# Patient Record
Sex: FEMALE | Race: WHITE | Employment: UNEMPLOYED | URBAN - METROPOLITAN AREA
[De-identification: names, ages, dates, MRNs, and addresses within clinical notes are randomized per-mention and may not be internally consistent; named-entity substitution may affect disease eponyms.]

---

## 2017-01-04 RX ORDER — FUROSEMIDE 40 MG/1
40 TABLET ORAL DAILY
Qty: 60 TABLET | OUTPATIENT
Start: 2017-01-04

## 2017-01-04 RX ORDER — ISOSORBIDE MONONITRATE 30 MG/1
30 TABLET, EXTENDED RELEASE ORAL DAILY
Qty: 90 TABLET | Refills: 3 | OUTPATIENT
Start: 2017-01-04

## 2017-01-04 RX ORDER — CARVEDILOL 12.5 MG/1
12.5 TABLET ORAL 2 TIMES DAILY
Qty: 180 TABLET | Refills: 3 | OUTPATIENT
Start: 2017-01-04

## 2017-03-24 ENCOUNTER — HOSPITAL ENCOUNTER (INPATIENT)
Age: 82
LOS: 1 days | Discharge: HOME | DRG: 683 | End: 2017-03-26
Attending: INTERNAL MEDICINE | Admitting: INTERNAL MEDICINE
Payer: MEDICARE

## 2017-03-24 ENCOUNTER — APPOINTMENT (OUTPATIENT)
Dept: CT IMAGING | Age: 82
DRG: 683 | End: 2017-03-24
Payer: MEDICARE

## 2017-03-24 DIAGNOSIS — G45.9 TRANSIENT CEREBRAL ISCHEMIA, UNSPECIFIED TYPE: Primary | ICD-10-CM

## 2017-03-24 DIAGNOSIS — R20.2 PARESTHESIA OF LEFT LEG: ICD-10-CM

## 2017-03-24 PROBLEM — F79 MENTAL RETARDATION: Chronic | Status: ACTIVE | Noted: 2017-03-24

## 2017-03-24 PROBLEM — G40.909 SEIZURE DISORDER (HCC): Chronic | Status: ACTIVE | Noted: 2017-03-24

## 2017-03-24 PROBLEM — R20.0 NUMBNESS AND TINGLING OF LEFT LEG: Status: ACTIVE | Noted: 2017-03-24

## 2017-03-24 PROBLEM — I10 ESSENTIAL HYPERTENSION: Chronic | Status: ACTIVE | Noted: 2017-03-24

## 2017-03-24 LAB
ALBUMIN SERPL-MCNC: 3.6 G/DL (ref 3.9–4.9)
ALP BLD-CCNC: 136 U/L (ref 40–130)
ALT SERPL-CCNC: 12 U/L (ref 0–33)
ANION GAP SERPL CALCULATED.3IONS-SCNC: 13 MEQ/L (ref 7–13)
AST SERPL-CCNC: 11 U/L (ref 0–35)
BASOPHILS ABSOLUTE: 0 K/UL (ref 0–0.2)
BASOPHILS RELATIVE PERCENT: 0.6 %
BILIRUB SERPL-MCNC: 0.3 MG/DL (ref 0–1.2)
BUN BLDV-MCNC: 34 MG/DL (ref 8–23)
CALCIUM SERPL-MCNC: 8.5 MG/DL (ref 8.6–10.2)
CHLORIDE BLD-SCNC: 107 MEQ/L (ref 98–107)
CO2: 19 MEQ/L (ref 22–29)
CREAT SERPL-MCNC: 1.68 MG/DL (ref 0.5–0.9)
EOSINOPHILS ABSOLUTE: 0.1 K/UL (ref 0–0.7)
EOSINOPHILS RELATIVE PERCENT: 1.2 %
GFR AFRICAN AMERICAN: 35
GFR NON-AFRICAN AMERICAN: 28.9
GLOBULIN: 2.7 G/DL (ref 2.3–3.5)
GLUCOSE BLD-MCNC: 121 MG/DL (ref 74–109)
HCT VFR BLD CALC: 35.7 % (ref 37–47)
HEMOGLOBIN: 12 G/DL (ref 12–16)
LYMPHOCYTES ABSOLUTE: 0.8 K/UL (ref 1–4.8)
LYMPHOCYTES RELATIVE PERCENT: 16.5 %
MCH RBC QN AUTO: 30.9 PG (ref 27–31.3)
MCHC RBC AUTO-ENTMCNC: 33.5 % (ref 33–37)
MCV RBC AUTO: 92.1 FL (ref 82–100)
MONOCYTES ABSOLUTE: 0.8 K/UL (ref 0.2–0.8)
MONOCYTES RELATIVE PERCENT: 16.5 %
NEUTROPHILS ABSOLUTE: 3.2 K/UL (ref 1.4–6.5)
NEUTROPHILS RELATIVE PERCENT: 65.2 %
PDW BLD-RTO: 14.5 % (ref 11.5–14.5)
PLATELET # BLD: 123 K/UL (ref 130–400)
POTASSIUM SERPL-SCNC: 4.6 MEQ/L (ref 3.5–5.1)
RBC # BLD: 3.87 M/UL (ref 4.2–5.4)
SODIUM BLD-SCNC: 139 MEQ/L (ref 132–144)
T4 FREE: 0.83 NG/DL (ref 0.93–1.7)
TOTAL CK: 35 U/L (ref 0–170)
TOTAL PROTEIN: 6.3 G/DL (ref 6.4–8.1)
TROPONIN: <0.01 NG/ML (ref 0–0.01)
TSH SERPL DL<=0.05 MIU/L-ACNC: 3.5 UIU/ML (ref 0.27–4.2)
WBC # BLD: 4.9 K/UL (ref 4.8–10.8)

## 2017-03-24 PROCEDURE — 70450 CT HEAD/BRAIN W/O DYE: CPT

## 2017-03-24 PROCEDURE — 6370000000 HC RX 637 (ALT 250 FOR IP): Performed by: INTERNAL MEDICINE

## 2017-03-24 PROCEDURE — G0378 HOSPITAL OBSERVATION PER HR: HCPCS

## 2017-03-24 PROCEDURE — 80053 COMPREHEN METABOLIC PANEL: CPT

## 2017-03-24 PROCEDURE — 36415 COLL VENOUS BLD VENIPUNCTURE: CPT

## 2017-03-24 PROCEDURE — 99285 EMERGENCY DEPT VISIT HI MDM: CPT

## 2017-03-24 PROCEDURE — 6370000000 HC RX 637 (ALT 250 FOR IP): Performed by: PHYSICIAN ASSISTANT

## 2017-03-24 PROCEDURE — 84484 ASSAY OF TROPONIN QUANT: CPT

## 2017-03-24 PROCEDURE — 6370000000 HC RX 637 (ALT 250 FOR IP): Performed by: PSYCHIATRY & NEUROLOGY

## 2017-03-24 PROCEDURE — 82550 ASSAY OF CK (CPK): CPT

## 2017-03-24 PROCEDURE — 84443 ASSAY THYROID STIM HORMONE: CPT

## 2017-03-24 PROCEDURE — 6360000002 HC RX W HCPCS: Performed by: INTERNAL MEDICINE

## 2017-03-24 PROCEDURE — 84439 ASSAY OF FREE THYROXINE: CPT

## 2017-03-24 PROCEDURE — 85025 COMPLETE CBC W/AUTO DIFF WBC: CPT

## 2017-03-24 PROCEDURE — 96372 THER/PROPH/DIAG INJ SC/IM: CPT

## 2017-03-24 PROCEDURE — 93005 ELECTROCARDIOGRAM TRACING: CPT

## 2017-03-24 RX ORDER — LEVETIRACETAM 500 MG/1
500 TABLET ORAL DAILY
Status: DISCONTINUED | OUTPATIENT
Start: 2017-03-24 | End: 2017-03-24

## 2017-03-24 RX ORDER — ISOSORBIDE MONONITRATE 30 MG/1
30 TABLET, EXTENDED RELEASE ORAL DAILY
Status: DISCONTINUED | OUTPATIENT
Start: 2017-03-24 | End: 2017-03-26 | Stop reason: HOSPADM

## 2017-03-24 RX ORDER — ACETAMINOPHEN 325 MG/1
650 TABLET ORAL EVERY 4 HOURS PRN
Status: DISCONTINUED | OUTPATIENT
Start: 2017-03-24 | End: 2017-03-26 | Stop reason: HOSPADM

## 2017-03-24 RX ORDER — ASPIRIN 81 MG/1
81 TABLET, CHEWABLE ORAL DAILY
Status: DISCONTINUED | OUTPATIENT
Start: 2017-03-24 | End: 2017-03-26 | Stop reason: HOSPADM

## 2017-03-24 RX ORDER — ASPIRIN 81 MG/1
324 TABLET, CHEWABLE ORAL ONCE
Status: COMPLETED | OUTPATIENT
Start: 2017-03-24 | End: 2017-03-24

## 2017-03-24 RX ORDER — ATENOLOL 100 MG/1
100 TABLET ORAL DAILY
Status: ON HOLD | COMMUNITY
End: 2017-03-26 | Stop reason: HOSPADM

## 2017-03-24 RX ORDER — SODIUM CHLORIDE 0.9 % (FLUSH) 0.9 %
10 SYRINGE (ML) INJECTION PRN
Status: DISCONTINUED | OUTPATIENT
Start: 2017-03-24 | End: 2017-03-26 | Stop reason: HOSPADM

## 2017-03-24 RX ORDER — SPIRONOLACTONE 25 MG/1
25 TABLET ORAL DAILY
Status: DISCONTINUED | OUTPATIENT
Start: 2017-03-24 | End: 2017-03-26 | Stop reason: HOSPADM

## 2017-03-24 RX ORDER — SIMVASTATIN 20 MG
20 TABLET ORAL NIGHTLY
Status: DISCONTINUED | OUTPATIENT
Start: 2017-03-24 | End: 2017-03-26 | Stop reason: HOSPADM

## 2017-03-24 RX ORDER — POTASSIUM CHLORIDE 20 MEQ/1
10 TABLET, EXTENDED RELEASE ORAL DAILY
Status: DISCONTINUED | OUTPATIENT
Start: 2017-03-24 | End: 2017-03-26 | Stop reason: HOSPADM

## 2017-03-24 RX ORDER — PHENYTOIN SODIUM 100 MG/1
100 CAPSULE, EXTENDED RELEASE ORAL 3 TIMES DAILY
Status: DISCONTINUED | OUTPATIENT
Start: 2017-03-24 | End: 2017-03-26 | Stop reason: HOSPADM

## 2017-03-24 RX ORDER — LEVETIRACETAM 500 MG/1
500 TABLET ORAL 2 TIMES DAILY
Status: DISCONTINUED | OUTPATIENT
Start: 2017-03-24 | End: 2017-03-26 | Stop reason: HOSPADM

## 2017-03-24 RX ORDER — SODIUM CHLORIDE 0.9 % (FLUSH) 0.9 %
10 SYRINGE (ML) INJECTION EVERY 12 HOURS SCHEDULED
Status: DISCONTINUED | OUTPATIENT
Start: 2017-03-24 | End: 2017-03-26 | Stop reason: HOSPADM

## 2017-03-24 RX ORDER — AMLODIPINE BESYLATE 5 MG/1
5 TABLET ORAL DAILY
Status: DISCONTINUED | OUTPATIENT
Start: 2017-03-24 | End: 2017-03-26 | Stop reason: HOSPADM

## 2017-03-24 RX ORDER — ATENOLOL 50 MG/1
100 TABLET ORAL DAILY
Status: DISCONTINUED | OUTPATIENT
Start: 2017-03-24 | End: 2017-03-26 | Stop reason: HOSPADM

## 2017-03-24 RX ORDER — FUROSEMIDE 40 MG/1
40 TABLET ORAL DAILY
Status: DISCONTINUED | OUTPATIENT
Start: 2017-03-24 | End: 2017-03-25

## 2017-03-24 RX ADMIN — ENOXAPARIN SODIUM 30 MG: 30 INJECTION SUBCUTANEOUS at 17:40

## 2017-03-24 RX ADMIN — LEVETIRACETAM 500 MG: 500 TABLET, FILM COATED ORAL at 21:20

## 2017-03-24 RX ADMIN — PHENYTOIN SODIUM 100 MG: 100 CAPSULE, EXTENDED RELEASE ORAL at 21:20

## 2017-03-24 RX ADMIN — ASPIRIN 324 MG: 81 TABLET, CHEWABLE ORAL at 13:50

## 2017-03-24 RX ADMIN — PHENYTOIN SODIUM 100 MG: 100 CAPSULE, EXTENDED RELEASE ORAL at 17:40

## 2017-03-24 RX ADMIN — SIMVASTATIN 20 MG: 20 TABLET, FILM COATED ORAL at 21:20

## 2017-03-24 ASSESSMENT — ENCOUNTER SYMPTOMS
COLOR CHANGE: 0
RHINORRHEA: 0
SHORTNESS OF BREATH: 0
ABDOMINAL DISTENTION: 0
SORE THROAT: 0
CONSTIPATION: 0
EYE DISCHARGE: 0
ABDOMINAL PAIN: 0

## 2017-03-25 ENCOUNTER — APPOINTMENT (OUTPATIENT)
Dept: ULTRASOUND IMAGING | Age: 82
DRG: 683 | End: 2017-03-25
Payer: MEDICARE

## 2017-03-25 LAB
ANION GAP SERPL CALCULATED.3IONS-SCNC: 12 MEQ/L (ref 7–13)
BUN BLDV-MCNC: 27 MG/DL (ref 8–23)
CALCIUM SERPL-MCNC: 8.6 MG/DL (ref 8.6–10.2)
CHLORIDE BLD-SCNC: 108 MEQ/L (ref 98–107)
CO2: 21 MEQ/L (ref 22–29)
CREAT SERPL-MCNC: 1.22 MG/DL (ref 0.5–0.9)
GFR AFRICAN AMERICAN: 50.6
GFR NON-AFRICAN AMERICAN: 41.8
GLUCOSE BLD-MCNC: 153 MG/DL (ref 74–109)
POTASSIUM SERPL-SCNC: 4.7 MEQ/L (ref 3.5–5.1)
SODIUM BLD-SCNC: 141 MEQ/L (ref 132–144)

## 2017-03-25 PROCEDURE — 2580000003 HC RX 258: Performed by: INTERNAL MEDICINE

## 2017-03-25 PROCEDURE — 6370000000 HC RX 637 (ALT 250 FOR IP): Performed by: INTERNAL MEDICINE

## 2017-03-25 PROCEDURE — 6360000002 HC RX W HCPCS: Performed by: INTERNAL MEDICINE

## 2017-03-25 PROCEDURE — 95816 EEG AWAKE AND DROWSY: CPT

## 2017-03-25 PROCEDURE — 80048 BASIC METABOLIC PNL TOTAL CA: CPT

## 2017-03-25 PROCEDURE — 93880 EXTRACRANIAL BILAT STUDY: CPT

## 2017-03-25 PROCEDURE — 36415 COLL VENOUS BLD VENIPUNCTURE: CPT

## 2017-03-25 PROCEDURE — 1210000000 HC MED SURG R&B

## 2017-03-25 PROCEDURE — 6370000000 HC RX 637 (ALT 250 FOR IP): Performed by: PSYCHIATRY & NEUROLOGY

## 2017-03-25 RX ORDER — SODIUM CHLORIDE 9 MG/ML
INJECTION, SOLUTION INTRAVENOUS CONTINUOUS
Status: DISCONTINUED | OUTPATIENT
Start: 2017-03-25 | End: 2017-03-26 | Stop reason: HOSPADM

## 2017-03-25 RX ADMIN — PHENYTOIN SODIUM 100 MG: 100 CAPSULE, EXTENDED RELEASE ORAL at 13:53

## 2017-03-25 RX ADMIN — PHENYTOIN SODIUM 100 MG: 100 CAPSULE, EXTENDED RELEASE ORAL at 21:44

## 2017-03-25 RX ADMIN — SODIUM CHLORIDE: 9 INJECTION, SOLUTION INTRAVENOUS at 12:04

## 2017-03-25 RX ADMIN — FUROSEMIDE 40 MG: 40 TABLET ORAL at 09:33

## 2017-03-25 RX ADMIN — SODIUM CHLORIDE: 9 INJECTION, SOLUTION INTRAVENOUS at 22:45

## 2017-03-25 RX ADMIN — PHENYTOIN SODIUM 100 MG: 100 CAPSULE, EXTENDED RELEASE ORAL at 09:33

## 2017-03-25 RX ADMIN — POTASSIUM CHLORIDE 10 MEQ: 20 TABLET, EXTENDED RELEASE ORAL at 09:34

## 2017-03-25 RX ADMIN — SERTRALINE HYDROCHLORIDE 50 MG: 50 TABLET ORAL at 09:33

## 2017-03-25 RX ADMIN — Medication 10 ML: at 09:05

## 2017-03-25 RX ADMIN — SIMVASTATIN 20 MG: 20 TABLET, FILM COATED ORAL at 21:44

## 2017-03-25 RX ADMIN — ISOSORBIDE MONONITRATE 30 MG: 30 TABLET, EXTENDED RELEASE ORAL at 09:34

## 2017-03-25 RX ADMIN — ENOXAPARIN SODIUM 30 MG: 30 INJECTION SUBCUTANEOUS at 17:03

## 2017-03-25 RX ADMIN — LEVETIRACETAM 500 MG: 500 TABLET, FILM COATED ORAL at 09:34

## 2017-03-25 RX ADMIN — ASPIRIN 81 MG: 81 TABLET, CHEWABLE ORAL at 09:32

## 2017-03-25 RX ADMIN — AMLODIPINE BESYLATE 5 MG: 5 TABLET ORAL at 09:34

## 2017-03-25 RX ADMIN — ATENOLOL 100 MG: 50 TABLET ORAL at 09:34

## 2017-03-25 RX ADMIN — SPIRONOLACTONE 25 MG: 25 TABLET, FILM COATED ORAL at 09:34

## 2017-03-25 RX ADMIN — LEVETIRACETAM 500 MG: 500 TABLET, FILM COATED ORAL at 21:44

## 2017-03-26 VITALS
WEIGHT: 135.58 LBS | RESPIRATION RATE: 16 BRPM | BODY MASS INDEX: 24.95 KG/M2 | HEART RATE: 68 BPM | SYSTOLIC BLOOD PRESSURE: 126 MMHG | DIASTOLIC BLOOD PRESSURE: 59 MMHG | TEMPERATURE: 98.4 F | HEIGHT: 62 IN | OXYGEN SATURATION: 96 %

## 2017-03-26 PROCEDURE — G8978 MOBILITY CURRENT STATUS: HCPCS

## 2017-03-26 PROCEDURE — 6370000000 HC RX 637 (ALT 250 FOR IP): Performed by: PSYCHIATRY & NEUROLOGY

## 2017-03-26 PROCEDURE — G8987 SELF CARE CURRENT STATUS: HCPCS

## 2017-03-26 PROCEDURE — 6370000000 HC RX 637 (ALT 250 FOR IP): Performed by: INTERNAL MEDICINE

## 2017-03-26 PROCEDURE — 2580000003 HC RX 258: Performed by: INTERNAL MEDICINE

## 2017-03-26 PROCEDURE — G8988 SELF CARE GOAL STATUS: HCPCS

## 2017-03-26 PROCEDURE — G8979 MOBILITY GOAL STATUS: HCPCS

## 2017-03-26 PROCEDURE — 97162 PT EVAL MOD COMPLEX 30 MIN: CPT

## 2017-03-26 PROCEDURE — G8989 SELF CARE D/C STATUS: HCPCS

## 2017-03-26 PROCEDURE — 97166 OT EVAL MOD COMPLEX 45 MIN: CPT

## 2017-03-26 PROCEDURE — 6360000002 HC RX W HCPCS: Performed by: INTERNAL MEDICINE

## 2017-03-26 RX ORDER — AMLODIPINE BESYLATE 5 MG/1
10 TABLET ORAL DAILY
Qty: 60 TABLET | Refills: 0 | Status: SHIPPED | OUTPATIENT
Start: 2017-03-26 | End: 2019-10-21

## 2017-03-26 RX ORDER — LEVETIRACETAM 500 MG/1
500 TABLET ORAL 2 TIMES DAILY
Qty: 60 TABLET | Refills: 0 | Status: SHIPPED | OUTPATIENT
Start: 2017-03-26

## 2017-03-26 RX ADMIN — ENOXAPARIN SODIUM 30 MG: 30 INJECTION SUBCUTANEOUS at 09:28

## 2017-03-26 RX ADMIN — ASPIRIN 81 MG: 81 TABLET, CHEWABLE ORAL at 09:30

## 2017-03-26 RX ADMIN — PHENYTOIN SODIUM 100 MG: 100 CAPSULE, EXTENDED RELEASE ORAL at 18:47

## 2017-03-26 RX ADMIN — SPIRONOLACTONE 25 MG: 25 TABLET, FILM COATED ORAL at 09:30

## 2017-03-26 RX ADMIN — LEVETIRACETAM 500 MG: 500 TABLET, FILM COATED ORAL at 09:30

## 2017-03-26 RX ADMIN — SERTRALINE HYDROCHLORIDE 50 MG: 50 TABLET ORAL at 09:30

## 2017-03-26 RX ADMIN — PHENYTOIN SODIUM 100 MG: 100 CAPSULE, EXTENDED RELEASE ORAL at 09:30

## 2017-03-26 RX ADMIN — AMLODIPINE BESYLATE 5 MG: 5 TABLET ORAL at 09:30

## 2017-03-26 RX ADMIN — ATENOLOL 100 MG: 50 TABLET ORAL at 09:29

## 2017-03-26 RX ADMIN — SODIUM CHLORIDE: 9 INJECTION, SOLUTION INTRAVENOUS at 09:28

## 2017-03-26 RX ADMIN — POTASSIUM CHLORIDE 10 MEQ: 20 TABLET, EXTENDED RELEASE ORAL at 09:30

## 2017-03-26 RX ADMIN — ISOSORBIDE MONONITRATE 30 MG: 30 TABLET, EXTENDED RELEASE ORAL at 09:29

## 2017-03-27 LAB
EKG ATRIAL RATE: 68 BPM
EKG P AXIS: 27 DEGREES
EKG P-R INTERVAL: 178 MS
EKG Q-T INTERVAL: 432 MS
EKG QRS DURATION: 74 MS
EKG QTC CALCULATION (BAZETT): 459 MS
EKG R AXIS: -5 DEGREES
EKG T AXIS: 73 DEGREES
EKG VENTRICULAR RATE: 68 BPM

## 2017-04-04 ENCOUNTER — HOSPITAL ENCOUNTER (EMERGENCY)
Age: 82
Discharge: HOME | End: 2017-04-04
Attending: EMERGENCY MEDICINE
Payer: MEDICARE

## 2017-04-04 ENCOUNTER — APPOINTMENT (OUTPATIENT)
Dept: GENERAL RADIOLOGY | Age: 82
End: 2017-04-04
Payer: MEDICARE

## 2017-04-04 VITALS
HEART RATE: 83 BPM | BODY MASS INDEX: 28.04 KG/M2 | SYSTOLIC BLOOD PRESSURE: 165 MMHG | TEMPERATURE: 98.4 F | OXYGEN SATURATION: 99 % | WEIGHT: 185 LBS | RESPIRATION RATE: 18 BRPM | DIASTOLIC BLOOD PRESSURE: 136 MMHG | HEIGHT: 68 IN

## 2017-04-04 DIAGNOSIS — S80.02XA CONTUSION OF LEFT KNEE, INITIAL ENCOUNTER: Primary | ICD-10-CM

## 2017-04-04 PROCEDURE — 73562 X-RAY EXAM OF KNEE 3: CPT

## 2017-04-04 PROCEDURE — 99283 EMERGENCY DEPT VISIT LOW MDM: CPT

## 2017-06-11 ENCOUNTER — HOSPITAL ENCOUNTER (EMERGENCY)
Age: 82
Discharge: HOME | End: 2017-06-11
Attending: FAMILY MEDICINE
Payer: MEDICARE

## 2017-06-11 ENCOUNTER — APPOINTMENT (OUTPATIENT)
Dept: GENERAL RADIOLOGY | Age: 82
End: 2017-06-11
Payer: MEDICARE

## 2017-06-11 VITALS
WEIGHT: 185 LBS | DIASTOLIC BLOOD PRESSURE: 43 MMHG | BODY MASS INDEX: 28.13 KG/M2 | OXYGEN SATURATION: 94 % | SYSTOLIC BLOOD PRESSURE: 110 MMHG | HEART RATE: 80 BPM | RESPIRATION RATE: 18 BRPM | TEMPERATURE: 98.5 F

## 2017-06-11 DIAGNOSIS — S92.301D CLOSED NONDISPLACED FRACTURE OF METATARSAL BONE OF RIGHT FOOT WITH ROUTINE HEALING, UNSPECIFIED METATARSAL, SUBSEQUENT ENCOUNTER: Primary | ICD-10-CM

## 2017-06-11 PROCEDURE — 73630 X-RAY EXAM OF FOOT: CPT

## 2017-06-11 PROCEDURE — 73610 X-RAY EXAM OF ANKLE: CPT

## 2017-06-11 PROCEDURE — 99284 EMERGENCY DEPT VISIT MOD MDM: CPT

## 2017-06-11 ASSESSMENT — PAIN DESCRIPTION - ORIENTATION: ORIENTATION: LEFT

## 2017-06-11 ASSESSMENT — PAIN SCALES - GENERAL: PAINLEVEL_OUTOF10: 8

## 2017-06-11 ASSESSMENT — PAIN DESCRIPTION - LOCATION: LOCATION: ANKLE

## 2018-02-13 ENCOUNTER — HOSPITAL ENCOUNTER (EMERGENCY)
Age: 83
Discharge: HOME | End: 2018-02-13
Attending: EMERGENCY MEDICINE
Payer: MEDICARE

## 2018-02-13 ENCOUNTER — APPOINTMENT (OUTPATIENT)
Dept: CT IMAGING | Age: 83
End: 2018-02-13
Payer: MEDICARE

## 2018-02-13 VITALS
SYSTOLIC BLOOD PRESSURE: 178 MMHG | HEART RATE: 77 BPM | WEIGHT: 145 LBS | TEMPERATURE: 98.2 F | RESPIRATION RATE: 22 BRPM | OXYGEN SATURATION: 100 % | BODY MASS INDEX: 22.05 KG/M2 | DIASTOLIC BLOOD PRESSURE: 66 MMHG

## 2018-02-13 DIAGNOSIS — G40.919 BREAKTHROUGH SEIZURE (HCC): Primary | ICD-10-CM

## 2018-02-13 DIAGNOSIS — N30.00 ACUTE CYSTITIS WITHOUT HEMATURIA: ICD-10-CM

## 2018-02-13 LAB
ALBUMIN SERPL-MCNC: 3.9 G/DL (ref 3.9–4.9)
ALP BLD-CCNC: 118 U/L (ref 40–130)
ALT SERPL-CCNC: 14 U/L (ref 0–33)
ANION GAP SERPL CALCULATED.3IONS-SCNC: 12 MEQ/L (ref 7–13)
AST SERPL-CCNC: 16 U/L (ref 0–35)
BACTERIA: ABNORMAL /HPF
BASOPHILS ABSOLUTE: 0 K/UL (ref 0–0.2)
BASOPHILS RELATIVE PERCENT: 0.7 %
BILIRUB SERPL-MCNC: 0.3 MG/DL (ref 0–1.2)
BILIRUBIN URINE: NEGATIVE
BLOOD, URINE: ABNORMAL
BUN BLDV-MCNC: 23 MG/DL (ref 8–23)
CALCIUM SERPL-MCNC: 8.6 MG/DL (ref 8.6–10.2)
CHLORIDE BLD-SCNC: 104 MEQ/L (ref 98–107)
CHP ED QC CHECK: YES
CLARITY: ABNORMAL
CO2: 24 MEQ/L (ref 22–29)
COLOR: YELLOW
CREAT SERPL-MCNC: 1.25 MG/DL (ref 0.5–0.9)
EKG ATRIAL RATE: 77 BPM
EKG P AXIS: 36 DEGREES
EKG P-R INTERVAL: 188 MS
EKG Q-T INTERVAL: 406 MS
EKG QRS DURATION: 82 MS
EKG QTC CALCULATION (BAZETT): 459 MS
EKG R AXIS: 0 DEGREES
EKG T AXIS: 55 DEGREES
EKG VENTRICULAR RATE: 77 BPM
EOSINOPHILS ABSOLUTE: 0.1 K/UL (ref 0–0.7)
EOSINOPHILS RELATIVE PERCENT: 2.8 %
GFR AFRICAN AMERICAN: 49.1
GFR NON-AFRICAN AMERICAN: 40.6
GLOBULIN: 2.9 G/DL (ref 2.3–3.5)
GLUCOSE BLD-MCNC: 106 MG/DL (ref 74–109)
GLUCOSE BLD-MCNC: 107 MG/DL
GLUCOSE BLD-MCNC: 107 MG/DL (ref 60–115)
GLUCOSE URINE: NEGATIVE MG/DL
HCT VFR BLD CALC: 38.6 % (ref 37–47)
HEMOGLOBIN: 13.4 G/DL (ref 12–16)
KETONES, URINE: NEGATIVE MG/DL
LACTIC ACID: 1.4 MMOL/L (ref 0.5–2.2)
LEUKOCYTE ESTERASE, URINE: ABNORMAL
LYMPHOCYTES ABSOLUTE: 0.7 K/UL (ref 1–4.8)
LYMPHOCYTES RELATIVE PERCENT: 18.2 %
MAGNESIUM: 2.4 MG/DL (ref 1.7–2.3)
MCH RBC QN AUTO: 32 PG (ref 27–31.3)
MCHC RBC AUTO-ENTMCNC: 34.7 % (ref 33–37)
MCV RBC AUTO: 92.1 FL (ref 82–100)
MONOCYTES ABSOLUTE: 0.5 K/UL (ref 0.2–0.8)
MONOCYTES RELATIVE PERCENT: 11.5 %
NEUTROPHILS ABSOLUTE: 2.7 K/UL (ref 1.4–6.5)
NEUTROPHILS RELATIVE PERCENT: 66.8 %
NITRITE, URINE: POSITIVE
PDW BLD-RTO: 14 % (ref 11.5–14.5)
PERFORMED ON: NORMAL
PH UA: 7 (ref 5–9)
PHENYTOIN LEVEL: 18.2 UG/ML (ref 10–20)
PLATELET # BLD: 134 K/UL (ref 130–400)
POTASSIUM SERPL-SCNC: 4.7 MEQ/L (ref 3.5–5.1)
PROTEIN UA: ABNORMAL MG/DL
RBC # BLD: 4.19 M/UL (ref 4.2–5.4)
RBC UA: ABNORMAL /HPF (ref 0–2)
SODIUM BLD-SCNC: 140 MEQ/L (ref 132–144)
SPECIFIC GRAVITY UA: 1.01 (ref 1–1.03)
TOTAL PROTEIN: 6.8 G/DL (ref 6.4–8.1)
TROPONIN: <0.01 NG/ML (ref 0–0.01)
UROBILINOGEN, URINE: 1 E.U./DL
WBC # BLD: 4.1 K/UL (ref 4.8–10.8)
WBC UA: ABNORMAL /HPF (ref 0–5)

## 2018-02-13 PROCEDURE — 84484 ASSAY OF TROPONIN QUANT: CPT

## 2018-02-13 PROCEDURE — 85025 COMPLETE CBC W/AUTO DIFF WBC: CPT

## 2018-02-13 PROCEDURE — 2580000003 HC RX 258: Performed by: EMERGENCY MEDICINE

## 2018-02-13 PROCEDURE — 70486 CT MAXILLOFACIAL W/O DYE: CPT

## 2018-02-13 PROCEDURE — 70450 CT HEAD/BRAIN W/O DYE: CPT

## 2018-02-13 PROCEDURE — 93005 ELECTROCARDIOGRAM TRACING: CPT

## 2018-02-13 PROCEDURE — 83605 ASSAY OF LACTIC ACID: CPT

## 2018-02-13 PROCEDURE — 36415 COLL VENOUS BLD VENIPUNCTURE: CPT

## 2018-02-13 PROCEDURE — 83735 ASSAY OF MAGNESIUM: CPT

## 2018-02-13 PROCEDURE — 81001 URINALYSIS AUTO W/SCOPE: CPT

## 2018-02-13 PROCEDURE — 80053 COMPREHEN METABOLIC PANEL: CPT

## 2018-02-13 PROCEDURE — 80185 ASSAY OF PHENYTOIN TOTAL: CPT

## 2018-02-13 PROCEDURE — 99284 EMERGENCY DEPT VISIT MOD MDM: CPT

## 2018-02-13 RX ORDER — SULFAMETHOXAZOLE AND TRIMETHOPRIM 800; 160 MG/1; MG/1
1 TABLET ORAL 2 TIMES DAILY
Qty: 20 TABLET | Refills: 0 | Status: SHIPPED | OUTPATIENT
Start: 2018-02-13 | End: 2018-02-23

## 2018-02-13 RX ORDER — 0.9 % SODIUM CHLORIDE 0.9 %
1000 INTRAVENOUS SOLUTION INTRAVENOUS ONCE
Status: COMPLETED | OUTPATIENT
Start: 2018-02-13 | End: 2018-02-13

## 2018-02-13 RX ADMIN — SODIUM CHLORIDE 1000 ML: 9 INJECTION, SOLUTION INTRAVENOUS at 18:00

## 2018-02-13 RX ADMIN — SODIUM CHLORIDE 1000 ML: 9 INJECTION, SOLUTION INTRAVENOUS at 16:30

## 2018-02-13 NOTE — ED PROVIDER NOTES
[address] [facility] ED  eMERGENCY dEPARTMENT eNCOUnter      Pt Name: Wesley Pavon  MRN: [id number]  Beatrizgfeleazar 5/9/1931  Date of evaluation: 2/13/2018  Provider: Toby Bledsoe MD    CHIEF COMPLAINT           HPI  Sheila Harmon is a 80 y.o. female per chart review has a h/o CHF, HTn, Hpl, MR, seizures on keppra and dilantin who lives in a NH presents to the ED with seizure, head trauma. Per group home, pt was sitting on the comode 2 hours ago and pt fell on her L side of her face. Pt with general tonic clonic mvmts for about 1 min before she fell and hit her face. Pt unable to verbalize if she is in pain. ROS  Review of Systems   Unable to perform ROS: Patient nonverbal       Except as noted above the remainder of the review of systems was reviewed and negative. PAST MEDICAL HISTORY     Past Medical History:   Diagnosis Date    CHF [facility]congestive [facility] failure) [facility]     Chronic CHF ([facility] 5/27/2016    Depression     Developmental disability 5/27/2016    Dizzy spells 7/18/2016    HTN (hypertension) 5/27/2016    Hyperlipidemia     Hypertension     Hypokalemia 5/27/2016    Hypoxia 5/27/2016    Mental retardation 5/27/2016    Mental retardation     Seizures (HCC)     SOB (shortness of breath) 7/18/2016    Unstable gait 7/19/2016         SURGICAL HISTORY     No past surgical history on file.       CURRENT MEDICATIONS       Previous Medications    AMLODIPINE (NORVASC) 5 MG TABLET    Take 2 tablets by mouth daily    AMLODIPINE-BENAZEPRIL (LOTREL) 5-10 MG PER CAPSULE    Take 1 capsule by mouth daily    ASPIRIN 81 MG TABLET    Take 81 mg by mouth daily    ASPIRIN 81 MG TABLET    Take 81 mg by mouth daily    CARVEDILOL (COREG) 12.5 MG TABLET    Take 1 tablet by mouth 2 times daily (with meals)    CARVEDILOL (COREG) 12.5 MG TABLET    Take 1 tablet by mouth 2 times daily    FUROSEMIDE (LASIX) 40 MG TABLET    Take 1 tablet by mouth daily    FUROSEMIDE (LASIX) 40 MG TABLET    Take 40 mg by mouth daily    ISOSORBIDE MONONITRATE (IMDUR) 30 MG EXTENDED RELEASE TABLET    Take 1 tablet by mouth daily    ISOSORBIDE MONONITRATE (IMDUR) 30 MG EXTENDED RELEASE TABLET    Take 1 tablet by mouth daily    LEVETIRACETAM (KEPPRA) 1000 MG TABLET    Take 1,000 mg by mouth daily    LEVETIRACETAM (KEPPRA) 500 MG TABLET    Take 1 tablet by mouth 2 times daily    PHENYTOIN (DILANTIN) 100 MG ER CAPSULE    Take 100 mg by mouth 3 times daily    PHENYTOIN (DILANTIN) 100 MG ER CAPSULE    Take 100 mg by mouth 3 times daily    POTASSIUM CHLORIDE (KLOR-CON M) 10 MEQ EXTENDED RELEASE TABLET    Take 1 tablet by mouth daily    POTASSIUM CHLORIDE SA (K-DUR;KLOR-CON M) 10 MEQ TABLET    Take 10 mEq by mouth daily    SERTRALINE (ZOLOFT) 50 MG TABLET    Take 50 mg by mouth daily    SERTRALINE (ZOLOFT) 50 MG TABLET    Take 50 mg by mouth daily    SIMVASTATIN (ZOCOR) 20 MG TABLET    Take 1 tablet by mouth nightly    SIMVASTATIN (ZOCOR) 20 MG TABLET    Take 20 mg by mouth nightly    SPIRONOLACTONE (ALDACTONE) 25 MG TABLET    Take 1 tablet by mouth daily    SPIRONOLACTONE (ALDACTONE) 25 MG TABLET    Take 1 tablet by mouth daily       ALLERGIES     Review of patient's allergies indicates no known allergies.     FAMILY HISTORY       Family History   Problem Relation Age of Onset    Family history unknown: Yes          SOCIAL HISTORY       Social History     Social History    Marital status: Single     Spouse name: N/A    Number of children: N/A    Years of education: N/A     Social History Main Topics    Smoking status: Never Smoker    Smokeless tobacco: Not on file    Alcohol use No    Drug use: No    Sexual activity: No     Other Topics Concern    Not on file     Social History Narrative    ** Merged History Encounter **              PHYSICAL EXAM       ED Triage Vitals [02/13/18 1559]   BP Temp Temp Source Pulse Resp SpO2 Height Weight   (!) 173/55 98.2 °F (36.8 °C) Oral 80 20 98 % -- 145 lb (65.8 kg)       Physical Exam

## 2018-02-14 PROCEDURE — 93010 ELECTROCARDIOGRAM REPORT: CPT | Performed by: INTERNAL MEDICINE

## 2018-02-14 NOTE — ED NOTES
Pt with large infiltrate of left f/a RN Joby and provider Tin notified. Distal MSPS intact.   Pt denies any pain      Amy Mcconnell RN  02/13/18 1933

## 2018-07-19 ENCOUNTER — APPOINTMENT (OUTPATIENT)
Dept: CT IMAGING | Age: 83
End: 2018-07-19
Payer: COMMERCIAL

## 2018-07-19 ENCOUNTER — HOSPITAL ENCOUNTER (EMERGENCY)
Age: 83
Discharge: HOME | End: 2018-07-19
Attending: EMERGENCY MEDICINE
Payer: COMMERCIAL

## 2018-07-19 VITALS
BODY MASS INDEX: 22.05 KG/M2 | HEART RATE: 70 BPM | DIASTOLIC BLOOD PRESSURE: 57 MMHG | TEMPERATURE: 97.5 F | RESPIRATION RATE: 16 BRPM | OXYGEN SATURATION: 97 % | SYSTOLIC BLOOD PRESSURE: 160 MMHG | WEIGHT: 145 LBS
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
## 2018-07-19 DIAGNOSIS — S09.90XA CLOSED HEAD INJURY, INITIAL ENCOUNTER: ICD-10-CM

## 2018-07-19 PROCEDURE — 70486 CT MAXILLOFACIAL W/O DYE: CPT

## 2018-07-19 PROCEDURE — 6370000000 HC RX 637 (ALT 250 FOR IP): Performed by: EMERGENCY MEDICINE

## 2018-07-19 PROCEDURE — 70450 CT HEAD/BRAIN W/O DYE: CPT

## 2018-07-19 PROCEDURE — 99284 EMERGENCY DEPT VISIT MOD MDM: CPT

## 2018-07-19 PROCEDURE — 72125 CT NECK SPINE W/O DYE: CPT

## 2018-07-19 RX ORDER — OXYCODONE HYDROCHLORIDE AND ACETAMINOPHEN 5; 325 MG/1; MG/1
1 TABLET ORAL ONCE
Status: COMPLETED | OUTPATIENT
Start: 2018-07-19 | End: 2018-07-19

## 2018-07-19 RX ADMIN — OXYCODONE HYDROCHLORIDE AND ACETAMINOPHEN 1 TABLET: 5; 325 TABLET ORAL at 11:52

## 2018-07-19 NOTE — ED NOTES
Pt CATRACHO Archer from [facility] and her Skyler Calderon arrived to pick patient up. Pt visibly happy with no distress noted.       Cornell Betancourt RN  07/19/18 3611

## 2018-08-04 ENCOUNTER — HOSPITAL ENCOUNTER (INPATIENT)
Age: 83
LOS: 4 days | Discharge: HOME HEALTH | DRG: 242 | End: 2018-08-09
Attending: STUDENT IN AN ORGANIZED HEALTH CARE EDUCATION/TRAINING PROGRAM | Admitting: INTERNAL MEDICINE
Payer: COMMERCIAL

## 2018-08-04 ENCOUNTER — APPOINTMENT (OUTPATIENT)
Dept: CT IMAGING | Age: 83
DRG: 242 | End: 2018-08-04
Payer: COMMERCIAL

## 2018-08-04 ENCOUNTER — APPOINTMENT (OUTPATIENT)
Dept: GENERAL RADIOLOGY | Age: 83
DRG: 242 | End: 2018-08-04
Payer: COMMERCIAL

## 2018-08-04 DIAGNOSIS — N30.00 ACUTE CYSTITIS WITHOUT HEMATURIA: ICD-10-CM

## 2018-08-04 DIAGNOSIS — J18.9 PNEUMONIA OF BOTH LOWER LOBES DUE TO INFECTIOUS ORGANISM: Primary | ICD-10-CM

## 2018-08-04 DIAGNOSIS — R11.11 VOMITING WITHOUT NAUSEA, INTRACTABILITY OF VOMITING NOT SPECIFIED, UNSPECIFIED VOMITING TYPE: ICD-10-CM

## 2018-08-04 DIAGNOSIS — S09.90XA CLOSED HEAD INJURY, INITIAL ENCOUNTER: ICD-10-CM

## 2018-08-04 DIAGNOSIS — T42.0X1A ACCIDENTAL PHENYTOIN POISONING, INITIAL ENCOUNTER: ICD-10-CM

## 2018-08-04 DIAGNOSIS — E04.1 THYROID NODULE: ICD-10-CM
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
## 2018-08-04 LAB
ALBUMIN SERPL-MCNC: 4.3 G/DL (ref 3.9–4.9)
ALP BLD-CCNC: 141 U/L (ref 40–130)
ALT SERPL-CCNC: 19 U/L (ref 0–33)
ANION GAP SERPL CALCULATED.3IONS-SCNC: 12 MEQ/L (ref 7–13)
APTT: 30.6 SEC (ref 21.6–35.4)
AST SERPL-CCNC: 17 U/L (ref 0–35)
BACTERIA: ABNORMAL /HPF
BASOPHILS ABSOLUTE: 0 K/UL (ref 0–0.2)
BASOPHILS RELATIVE PERCENT: 0.7 %
BILIRUB SERPL-MCNC: 0.3 MG/DL (ref 0–1.2)
BILIRUBIN URINE: NEGATIVE
BLOOD, URINE: ABNORMAL
BUN BLDV-MCNC: 26 MG/DL (ref 8–23)
CALCIUM SERPL-MCNC: 8.9 MG/DL (ref 8.6–10.2)
CHLORIDE BLD-SCNC: 102 MEQ/L (ref 98–107)
CLARITY: ABNORMAL
CO2: 25 MEQ/L (ref 22–29)
COLOR: YELLOW
CREAT SERPL-MCNC: 1 MG/DL (ref 0.5–0.9)
EOSINOPHILS ABSOLUTE: 0.1 K/UL (ref 0–0.7)
EOSINOPHILS RELATIVE PERCENT: 1.8 %
EPITHELIAL CELLS, UA: ABNORMAL /HPF
GFR AFRICAN AMERICAN: >60
GFR NON-AFRICAN AMERICAN: 52.4
GLOBULIN: 3.4 G/DL (ref 2.3–3.5)
GLUCOSE BLD-MCNC: 119 MG/DL (ref 74–109)
GLUCOSE URINE: NEGATIVE MG/DL
HCT VFR BLD CALC: 42.7 % (ref 37–47)
HEMOGLOBIN: 14.5 G/DL (ref 12–16)
INR BLD: 1.1
KETONES, URINE: NEGATIVE MG/DL
LEUKOCYTE ESTERASE, URINE: ABNORMAL
LYMPHOCYTES ABSOLUTE: 0.8 K/UL (ref 1–4.8)
LYMPHOCYTES RELATIVE PERCENT: 15.6 %
MCH RBC QN AUTO: 31.8 PG (ref 27–31.3)
MCHC RBC AUTO-ENTMCNC: 34 % (ref 33–37)
MCV RBC AUTO: 93.5 FL (ref 82–100)
MONOCYTES ABSOLUTE: 0.3 K/UL (ref 0.2–0.8)
MONOCYTES RELATIVE PERCENT: 6.3 %
NEUTROPHILS ABSOLUTE: 4 K/UL (ref 1.4–6.5)
NEUTROPHILS RELATIVE PERCENT: 75.6 %
NITRITE, URINE: POSITIVE
PDW BLD-RTO: 13.6 % (ref 11.5–14.5)
PH UA: 5 (ref 5–9)
PHENYTOIN LEVEL: 23.9 UG/ML (ref 10–20)
PLATELET # BLD: 154 K/UL (ref 130–400)
POTASSIUM SERPL-SCNC: 4.7 MEQ/L (ref 3.5–5.1)
PROTEIN UA: ABNORMAL MG/DL
PROTHROMBIN TIME: 11.4 SEC (ref 9.6–12.3)
RBC # BLD: 4.56 M/UL (ref 4.2–5.4)
RBC UA: ABNORMAL /HPF (ref 0–2)
SODIUM BLD-SCNC: 139 MEQ/L (ref 132–144)
SPECIFIC GRAVITY UA: 1.01 (ref 1–1.03)
TOTAL CK: 51 U/L (ref 0–170)
TOTAL PROTEIN: 7.7 G/DL (ref 6.4–8.1)
URINE REFLEX TO CULTURE: YES
UROBILINOGEN, URINE: 0.2 E.U./DL
WBC # BLD: 5.2 K/UL (ref 4.8–10.8)
WBC UA: ABNORMAL /HPF (ref 0–5)

## 2018-08-04 PROCEDURE — 36415 COLL VENOUS BLD VENIPUNCTURE: CPT

## 2018-08-04 PROCEDURE — 85610 PROTHROMBIN TIME: CPT

## 2018-08-04 PROCEDURE — 85730 THROMBOPLASTIN TIME PARTIAL: CPT

## 2018-08-04 PROCEDURE — 80053 COMPREHEN METABOLIC PANEL: CPT

## 2018-08-04 PROCEDURE — 99285 EMERGENCY DEPT VISIT HI MDM: CPT

## 2018-08-04 PROCEDURE — G0378 HOSPITAL OBSERVATION PER HR: HCPCS

## 2018-08-04 PROCEDURE — 87186 SC STD MICRODIL/AGAR DIL: CPT

## 2018-08-04 PROCEDURE — 80185 ASSAY OF PHENYTOIN TOTAL: CPT

## 2018-08-04 PROCEDURE — 2500000003 HC RX 250 WO HCPCS: Performed by: STUDENT IN AN ORGANIZED HEALTH CARE EDUCATION/TRAINING PROGRAM

## 2018-08-04 PROCEDURE — 72125 CT NECK SPINE W/O DYE: CPT

## 2018-08-04 PROCEDURE — 12011 RPR F/E/E/N/L/M 2.5 CM/<: CPT

## 2018-08-04 PROCEDURE — 85025 COMPLETE CBC W/AUTO DIFF WBC: CPT

## 2018-08-04 PROCEDURE — 87086 URINE CULTURE/COLONY COUNT: CPT

## 2018-08-04 PROCEDURE — 2580000003 HC RX 258: Performed by: STUDENT IN AN ORGANIZED HEALTH CARE EDUCATION/TRAINING PROGRAM

## 2018-08-04 PROCEDURE — 90715 TDAP VACCINE 7 YRS/> IM: CPT | Performed by: STUDENT IN AN ORGANIZED HEALTH CARE EDUCATION/TRAINING PROGRAM

## 2018-08-04 PROCEDURE — 87040 BLOOD CULTURE FOR BACTERIA: CPT

## 2018-08-04 PROCEDURE — 96365 THER/PROPH/DIAG IV INF INIT: CPT

## 2018-08-04 PROCEDURE — 6360000002 HC RX W HCPCS: Performed by: STUDENT IN AN ORGANIZED HEALTH CARE EDUCATION/TRAINING PROGRAM

## 2018-08-04 PROCEDURE — 87077 CULTURE AEROBIC IDENTIFY: CPT

## 2018-08-04 PROCEDURE — 82550 ASSAY OF CK (CPK): CPT

## 2018-08-04 PROCEDURE — 90471 IMMUNIZATION ADMIN: CPT | Performed by: STUDENT IN AN ORGANIZED HEALTH CARE EDUCATION/TRAINING PROGRAM

## 2018-08-04 PROCEDURE — 81001 URINALYSIS AUTO W/SCOPE: CPT

## 2018-08-04 PROCEDURE — 70450 CT HEAD/BRAIN W/O DYE: CPT

## 2018-08-04 PROCEDURE — 71045 X-RAY EXAM CHEST 1 VIEW: CPT

## 2018-08-04 PROCEDURE — 0HQ2XZZ REPAIR RIGHT EAR SKIN, EXTERNAL APPROACH: ICD-10-PCS | Performed by: STUDENT IN AN ORGANIZED HEALTH CARE EDUCATION/TRAINING PROGRAM

## 2018-08-04 RX ORDER — SODIUM CHLORIDE 0.9 % (FLUSH) 0.9 %
10 SYRINGE (ML) INJECTION PRN
Status: DISCONTINUED | OUTPATIENT
Start: 2018-08-04 | End: 2018-08-09 | Stop reason: HOSPADM

## 2018-08-04 RX ORDER — ONDANSETRON 2 MG/ML
4 INJECTION INTRAMUSCULAR; INTRAVENOUS EVERY 6 HOURS PRN
Status: DISCONTINUED | OUTPATIENT
Start: 2018-08-04 | End: 2018-08-08

## 2018-08-04 RX ORDER — ONDANSETRON 4 MG/1
4 TABLET, ORALLY DISINTEGRATING ORAL ONCE
Status: COMPLETED | OUTPATIENT
Start: 2018-08-04 | End: 2018-08-04

## 2018-08-04 RX ORDER — ONDANSETRON 4 MG/1
4 TABLET, ORALLY DISINTEGRATING ORAL EVERY 6 HOURS PRN
Status: DISCONTINUED | OUTPATIENT
Start: 2018-08-04 | End: 2018-08-09 | Stop reason: HOSPADM

## 2018-08-04 RX ORDER — SODIUM CHLORIDE 0.9 % (FLUSH) 0.9 %
10 SYRINGE (ML) INJECTION EVERY 12 HOURS SCHEDULED
Status: DISCONTINUED | OUTPATIENT
Start: 2018-08-05 | End: 2018-08-09 | Stop reason: HOSPADM

## 2018-08-04 RX ORDER — MULTIVIT-MIN/IRON/FOLIC ACID/K 18-600-40
2000 CAPSULE ORAL DAILY
COMMUNITY

## 2018-08-04 RX ORDER — LIDOCAINE HYDROCHLORIDE AND EPINEPHRINE 10; 10 MG/ML; UG/ML
20 INJECTION, SOLUTION INFILTRATION; PERINEURAL ONCE
Status: COMPLETED | OUTPATIENT
Start: 2018-08-04 | End: 2018-08-04

## 2018-08-04 RX ADMIN — ONDANSETRON 4 MG: 4 TABLET, ORALLY DISINTEGRATING ORAL at 19:28

## 2018-08-04 RX ADMIN — TETANUS TOXOID, REDUCED DIPHTHERIA TOXOID AND ACELLULAR PERTUSSIS VACCINE, ADSORBED 0.5 ML: 5; 2.5; 8; 8; 2.5 SUSPENSION INTRAMUSCULAR at 17:48

## 2018-08-04 RX ADMIN — CEFTRIAXONE SODIUM 1 G: 1 INJECTION, POWDER, FOR SOLUTION INTRAMUSCULAR; INTRAVENOUS at 19:28

## 2018-08-04 RX ADMIN — LIDOCAINE HYDROCHLORIDE,EPINEPHRINE BITARTRATE 20 ML: 10; .01 INJECTION, SOLUTION INFILTRATION; PERINEURAL at 19:19

## 2018-08-04 ASSESSMENT — ENCOUNTER SYMPTOMS: VOMITING: 1

## 2018-08-04 ASSESSMENT — PAIN SCALES - GENERAL: PAINLEVEL_OUTOF10: 5

## 2018-08-04 NOTE — ED TRIAGE NOTES
Pt presents to ED c/o fall. Per squad pt is MRDD & caregiver was at house when pt fell & hit head. Laceration noted to left top of head. Caregiver told squad that there was no LOC. Pt takes 81 mg asa daily. Pt is A&O x1 which is baseline. Pt does follow simple commands.

## 2018-08-04 NOTE — ED PROVIDER NOTES
[address] [facility] ED  eMERGENCY dEPARTMENT eNCOUnter      Pt Name: Alfie Pavon  MRN: [id number]  Armsdanielgfurt 5/9/1931  Date of evaluation: 8/4/2018  Provider: Robyn Guzman DO    CHIEF COMPLAINT       Chief Complaint   Patient presents with    Fall     Pt fell & hit back of head per squad. Pt is A&O x1 which is baseline-hx MRDD. No LOC per caregiver. Pt takes 81 mg ASA daily. HISTORY OF PRESENT ILLNESS   (Location/Symptom, Timing/Onset, Context/Setting, Quality, Duration, Modifying Factors, Severity)  Note limiting factors. Alfie Pavon is a 80 y.o. female who presents to the emergency department with complaint of fall. Patient has laceration to both sides of ear (ER doctor probed laceration and it does not extend through ear). Also scalp laceration. No LOC. Care takers had patient sent to ER for evaluation. Patient AOx 1 and has MR. Patient appears to be at base line. While in the ER patient vomited during hospital work up (in the ED). HPI    Nursing Notes were reviewed. REVIEW OF SYSTEMS    (2-9 systems for level 4, 10 or more for level 5)     Review of Systems   Unable to perform ROS: Other   Constitutional: Negative for activity change and fever. Gastrointestinal: Positive for vomiting. Skin: Positive for wound. Except as noted above the remainder of the review of systems was reviewed and negative. PAST MEDICAL HISTORY     Past Medical History:   Diagnosis Date    CHF (congestive heart failure) ([facility]     Chronic CHF ([facility] 5/27/2016    Depression     Developmental disability 5/27/2016    Dizzy spells 7/18/2016    HTN (hypertension) 5/27/2016    Hyperlipidemia     Hypertension     Hypokalemia 5/27/2016    Hypoxia 5/27/2016    Mental retardation 5/27/2016    Mental retardation     Seizures (HCC)     SOB (shortness of breath) 7/18/2016    Unstable gait 7/19/2016         SURGICAL HISTORY     History reviewed.  No pertinent surgical  Marital status: Single     Spouse name: N/A    Number of children: N/A    Years of education: N/A     Social History Main Topics    Smoking status: Never Smoker    Smokeless tobacco: Never Used    Alcohol use No    Drug use: No    Sexual activity: No     Other Topics Concern    None     Social History Narrative    ** Merged History Encounter **            SCREENINGS             PHYSICAL EXAM    (up to 7 for level 4, 8 or more for level 5)     ED Triage Vitals [08/04/18 1520]   BP Temp Temp Source Pulse Resp SpO2 Height Weight   (!) 171/63 98.1 °F (36.7 °C) Oral 67 20 96 % 5' 3\" (1.6 m) 150 lb (68 kg)       Physical Exam   Constitutional: She appears well-developed and well-nourished. No distress. HENT:   Head: Normocephalic and atraumatic. Head is without Mendez's sign. Right Ear: External ear normal.   Left Ear: External ear normal.   Nose: Nose normal.   Mouth/Throat: Oropharynx is clear and moist. No oropharyngeal exudate. Eyes: Conjunctivae and EOM are normal. Pupils are equal, round, and reactive to light. No foreign body present in the right eye. Left eye exhibits no exudate. No scleral icterus. Neck: Normal range of motion. Neck supple. No JVD present. No neck rigidity. No tracheal deviation present. Cardiovascular: Normal rate, regular rhythm, normal heart sounds and intact distal pulses. Exam reveals no gallop, no distant heart sounds and no friction rub. No murmur heard. Pulmonary/Chest: Effort normal and breath sounds normal. No stridor. No respiratory distress. She has no wheezes. She has no rales. She exhibits no tenderness. Abdominal: Soft. Bowel sounds are normal. She exhibits no distension, no pulsatile liver, no ascites and no mass. There is no hepatosplenomegaly. There is no tenderness. There is no rebound and no guarding. Musculoskeletal: Normal range of motion. She exhibits no edema or tenderness.    Lymphadenopathy:        Head (right side): No submental adenopathy present. Head (left side): No submental adenopathy present. Neurological: She is alert. No cranial nerve deficit. Coordination normal.   AOx1   Skin: Skin is warm and dry. No rash noted. She is not diaphoretic. No erythema. No pallor. Psychiatric: She has a normal mood and affect. Her behavior is normal. Judgment and thought content normal.   Nursing note and vitals reviewed. DIAGNOSTIC RESULTS     EKG: All EKG's are interpreted by the Emergency Department Physician who either signs or Co-signs this chart in the absence of a cardiologist.        RADIOLOGY:   Non-plain film images such as CT, Ultrasound and MRI are read by the radiologist. Plain radiographic images are visualized and preliminarily interpreted by the emergency physician with the below findings:        Interpretation per the Radiologist below, if available at the time of this note:    XR CHEST PORTABLE   Final Result   MILD [place]. SMALL LEFT-SIDED PLEURAL EFFUSION. SMALL INFILTRATE/ATELECTASIS IN THE LUNG BASES. CT HEAD WO CONTRAST   Final Result      AGE-RELATED FINDINGS, NO ACUTE ABNORMALITY. CEREBRAL ATROPHY UNCHANGED. SOFT TISSUE SWELLING LEFT PARIETAL SCALP. CT OF THE CERVICAL SPINE:      COMPARISONS:  NONE      REASON FOR EXAMINATION:  See above. TECHNIQUE:  Thin axial sections were obtained through the cervical spine. Images were reformatted in the coronal and sagittal projections. FINDINGS: Bones are demineralized. There is good alignment of the spine present. No fracture nor spondylolisthesis is seen. There is narrowing at the C3-4 and C4-5, C5-6 and C6-7 disc levels. The prevertebral soft tissues are unremarkable. Pleural    thickening in apices. Dilated esophagus with air-fluid level. There is a 2 cm nodule within the thyroid isthmus. C3/C4: Narrowed disc with posterior bony spurring.  Also anterior spurring narrowing of neural foramina. C6/7:   Narrowed disc with posterior bony spurring. IMPRESSION:      DEGENERATIVE CHANGES WITHOUT EVIDENCE OF FRACTURE OR MALALIGNMENT. DILATED ESOPHAGUS WITH AIR-FLUID LEVEL.      2 CM NODULE WITHIN THYROID ISTHMUS, RECOMMEND CORRELATION WITH ULTRASOUND. All CT scans at this facility use dose modulation, iterative reconstruction, and/or weight based dosing when appropriate to reduce radiation dose to as low as reasonably achievable. ED BEDSIDE ULTRASOUND:   Performed by ED Physician - none    LABS:  Labs Reviewed   CBC WITH AUTO DIFFERENTIAL - Abnormal; Notable for the following:        Result Value    MCH 31.8 (*)     Lymphocytes # 0.8 (*)     All other components within normal limits   COMPREHENSIVE METABOLIC PANEL - Abnormal; Notable for the following:     Glucose 119 (*)     BUN 26 (*)     CREATININE 1.00 (*)     GFR Non- 52.4 (*)     Alkaline Phosphatase 141 (*)     All other components within normal limits   PHENYTOIN LEVEL, TOTAL - Abnormal; Notable for the following:     Phenytoin Lvl 23.9 (*)     All other components within normal limits   URINE RT REFLEX TO CULTURE - Abnormal; Notable for the following:     Clarity, UA CLOUDY (*)     Blood, Urine TRACE (*)     Protein, UA TRACE (*)     Nitrite, Urine POSITIVE (*)     Leukocyte Esterase, Urine MODERATE (*)     All other components within normal limits   MICROSCOPIC URINALYSIS - Abnormal; Notable for the following:     WBC, UA  (*)     All other components within normal limits   URINE CULTURE   CULTURE BLOOD #1   CULTURE BLOOD #2   PROTIME-INR   APTT   CK       All other labs were within normal range or not returned as of this dictation.     EMERGENCY DEPARTMENT COURSE and DIFFERENTIAL DIAGNOSIS/MDM:   Vitals:    Vitals:    08/04/18 1520 08/04/18 1831   BP: (!) 171/63 (!) 173/64   Pulse: 67 79   Resp: 20 20   Temp: 98.1 °F (36.7 °C)    TempSrc: Oral    SpO2: 96% bodies/material removed: no    Skin repair:     Repair method:  Sutures    Suture size:  6-0    Suture material:  Nylon    Suture technique:  Simple interrupted    Number of sutures:  4  Approximation:     Approximation:  Close    [place] border: well-aligned    Post-procedure details:     Dressing:  Open (no dressing)        FINAL IMPRESSION      1. Pneumonia of both lower lobes due to infectious organism ([facility]    2. Acute cystitis without hematuria    3. Closed head injury, initial encounter    4. Accidental phenytoin poisoning, initial encounter    5. Vomiting without nausea, intractability of vomiting not specified, unspecified vomiting type          DISPOSITION/PLAN   DISPOSITION        PATIENT REFERRED TO:  No follow-up provider specified.     DISCHARGE MEDICATIONS:  New Prescriptions    No medications on file          (Please note that portions of this note were completed with a voice recognition program.  Efforts were made to edit the dictations but occasionally words are mis-transcribed.)    Rad Whyte DO (electronically signed)  Attending Emergency Physician          Rad Whyte DO  08/04/18 [address] [place] [address]  08/04/18 4684

## 2018-08-04 NOTE — ED NOTES
[address] [facility]) update on status of pt.  Pt to be admitted [address], [address]  08/04/18 8174

## 2018-08-04 NOTE — ED NOTES
Bed: 06  Expected date:   Expected time:   Means of arrival:   Comments:  81 yo female fall hit head no LOC     Rosa Isela Caicedo RN  08/04/18 1195

## 2018-08-04 NOTE — ED NOTES
Sutures & staples to lacerations done by Dr Pedro Luis Diehl with this RN at bedside. Pt cleansed of blood & changed into gown. Pt tolerated well.      Marissa Parekh RN  08/04/18 0162

## 2018-08-05 PROBLEM — J98.11 ATELECTASIS, BILATERAL: Chronic | Status: ACTIVE | Noted: 2018-08-05

## 2018-08-05 PROBLEM — F07.81 CONCUSSION SYNDROME: Status: ACTIVE | Noted: 2018-08-05

## 2018-08-05 PROBLEM — S06.0XAA CONCUSSION: Status: ACTIVE | Noted: 2018-08-05

## 2018-08-05 PROBLEM — R78.89 ELEVATED PHENYTOIN LEVEL: Status: ACTIVE | Noted: 2018-08-05

## 2018-08-05 PROBLEM — Z87.440 HISTORY OF RECURRENT UTI (URINARY TRACT INFECTION): Chronic | Status: ACTIVE | Noted: 2018-08-05

## 2018-08-05 PROBLEM — I45.9 HB (HEART BLOCK): Status: ACTIVE | Noted: 2018-08-05

## 2018-08-05 PROBLEM — I35.1 AORTIC REGURGITATION: Chronic | Status: ACTIVE | Noted: 2018-08-05

## 2018-08-05 PROBLEM — Z04.89: Status: ACTIVE | Noted: 2018-08-05

## 2018-08-05 PROBLEM — N18.30 CKD (CHRONIC KIDNEY DISEASE), STAGE III (HCC): Chronic | Status: ACTIVE | Noted: 2018-08-05

## 2018-08-05 PROBLEM — S06.0XAA CONCUSSION: Status: RESOLVED | Noted: 2018-08-05 | Resolved: 2018-08-05

## 2018-08-05 LAB
ANION GAP SERPL CALCULATED.3IONS-SCNC: 13 MEQ/L (ref 7–13)
BUN BLDV-MCNC: 23 MG/DL (ref 8–23)
CALCIUM SERPL-MCNC: 8.6 MG/DL (ref 8.6–10.2)
CHLORIDE BLD-SCNC: 106 MEQ/L (ref 98–107)
CO2: 22 MEQ/L (ref 22–29)
CREAT SERPL-MCNC: 1.01 MG/DL (ref 0.5–0.9)
GFR AFRICAN AMERICAN: >60
GFR NON-AFRICAN AMERICAN: 51.8
GLUCOSE BLD-MCNC: 106 MG/DL (ref 74–109)
HCT VFR BLD CALC: 37.3 % (ref 37–47)
HEMOGLOBIN: 13.2 G/DL (ref 12–16)
MCH RBC QN AUTO: 32.8 PG (ref 27–31.3)
MCHC RBC AUTO-ENTMCNC: 35.4 % (ref 33–37)
MCV RBC AUTO: 92.7 FL (ref 82–100)
PDW BLD-RTO: 13.4 % (ref 11.5–14.5)
PLATELET # BLD: 135 K/UL (ref 130–400)
POTASSIUM REFLEX MAGNESIUM: 4.3 MEQ/L (ref 3.5–5.1)
RBC # BLD: 4.02 M/UL (ref 4.2–5.4)
SODIUM BLD-SCNC: 141 MEQ/L (ref 132–144)
VITAMIN D 25-HYDROXY: 40.3 NG/ML (ref 30–100)
WBC # BLD: 4.9 K/UL (ref 4.8–10.8)

## 2018-08-05 PROCEDURE — 80048 BASIC METABOLIC PNL TOTAL CA: CPT

## 2018-08-05 PROCEDURE — 82306 VITAMIN D 25 HYDROXY: CPT

## 2018-08-05 PROCEDURE — 6360000002 HC RX W HCPCS: Performed by: NURSE PRACTITIONER

## 2018-08-05 PROCEDURE — 85027 COMPLETE CBC AUTOMATED: CPT

## 2018-08-05 PROCEDURE — 1210000000 HC MED SURG R&B

## 2018-08-05 PROCEDURE — 6370000000 HC RX 637 (ALT 250 FOR IP): Performed by: INTERNAL MEDICINE

## 2018-08-05 PROCEDURE — 2580000003 HC RX 258: Performed by: NURSE PRACTITIONER

## 2018-08-05 PROCEDURE — 96372 THER/PROPH/DIAG INJ SC/IM: CPT

## 2018-08-05 PROCEDURE — 93005 ELECTROCARDIOGRAM TRACING: CPT

## 2018-08-05 PROCEDURE — 36415 COLL VENOUS BLD VENIPUNCTURE: CPT

## 2018-08-05 RX ORDER — M-VIT,TX,IRON,MINS/CALC/FOLIC 27MG-0.4MG
1 TABLET ORAL DAILY
Status: DISCONTINUED | OUTPATIENT
Start: 2018-08-05 | End: 2018-08-09 | Stop reason: HOSPADM

## 2018-08-05 RX ORDER — SPIRONOLACTONE 25 MG/1
25 TABLET ORAL DAILY
Status: DISCONTINUED | OUTPATIENT
Start: 2018-08-05 | End: 2018-08-09 | Stop reason: HOSPADM

## 2018-08-05 RX ORDER — ASPIRIN 81 MG/1
81 TABLET, CHEWABLE ORAL DAILY
Status: DISCONTINUED | OUTPATIENT
Start: 2018-08-05 | End: 2018-08-09 | Stop reason: HOSPADM

## 2018-08-05 RX ORDER — CARVEDILOL 12.5 MG/1
12.5 TABLET ORAL 2 TIMES DAILY
Status: DISCONTINUED | OUTPATIENT
Start: 2018-08-05 | End: 2018-08-06

## 2018-08-05 RX ORDER — ISOSORBIDE MONONITRATE 30 MG/1
30 TABLET, EXTENDED RELEASE ORAL DAILY
Status: DISCONTINUED | OUTPATIENT
Start: 2018-08-05 | End: 2018-08-09 | Stop reason: HOSPADM

## 2018-08-05 RX ORDER — SIMVASTATIN 20 MG
20 TABLET ORAL NIGHTLY
Status: DISCONTINUED | OUTPATIENT
Start: 2018-08-05 | End: 2018-08-09 | Stop reason: HOSPADM

## 2018-08-05 RX ORDER — LEVETIRACETAM 500 MG/1
1000 TABLET ORAL DAILY
Status: DISCONTINUED | OUTPATIENT
Start: 2018-08-05 | End: 2018-08-09 | Stop reason: HOSPADM

## 2018-08-05 RX ORDER — ACETAMINOPHEN 325 MG/1
650 TABLET ORAL EVERY 8 HOURS SCHEDULED
Status: DISCONTINUED | OUTPATIENT
Start: 2018-08-05 | End: 2018-08-09 | Stop reason: HOSPADM

## 2018-08-05 RX ORDER — FUROSEMIDE 40 MG/1
40 TABLET ORAL DAILY
Status: DISCONTINUED | OUTPATIENT
Start: 2018-08-05 | End: 2018-08-06

## 2018-08-05 RX ADMIN — CEFTRIAXONE SODIUM 1 G: 1 INJECTION, POWDER, FOR SOLUTION INTRAMUSCULAR; INTRAVENOUS at 18:38

## 2018-08-05 RX ADMIN — CARVEDILOL 12.5 MG: 12.5 TABLET, FILM COATED ORAL at 01:54

## 2018-08-05 RX ADMIN — ISOSORBIDE MONONITRATE 30 MG: 30 TABLET, EXTENDED RELEASE ORAL at 09:27

## 2018-08-05 RX ADMIN — ENOXAPARIN SODIUM 40 MG: 40 INJECTION SUBCUTANEOUS at 09:27

## 2018-08-05 RX ADMIN — FUROSEMIDE 40 MG: 40 TABLET ORAL at 09:27

## 2018-08-05 RX ADMIN — ACETAMINOPHEN 650 MG: 325 TABLET ORAL at 20:46

## 2018-08-05 RX ADMIN — SPIRONOLACTONE 25 MG: 25 TABLET ORAL at 09:27

## 2018-08-05 RX ADMIN — LEVETIRACETAM 1000 MG: 500 TABLET ORAL at 09:27

## 2018-08-05 RX ADMIN — CARVEDILOL 12.5 MG: 12.5 TABLET, FILM COATED ORAL at 20:47

## 2018-08-05 RX ADMIN — Medication 10 ML: at 09:41

## 2018-08-05 RX ADMIN — CARVEDILOL 12.5 MG: 12.5 TABLET, FILM COATED ORAL at 09:27

## 2018-08-05 RX ADMIN — ASPIRIN 81 MG 81 MG: 81 TABLET ORAL at 09:27

## 2018-08-05 RX ADMIN — MULTIPLE VITAMINS W/ MINERALS TAB 1 TABLET: TAB at 18:38

## 2018-08-05 RX ADMIN — SIMVASTATIN 20 MG: 20 TABLET, FILM COATED ORAL at 20:46

## 2018-08-05 RX ADMIN — SIMVASTATIN 20 MG: 20 TABLET, FILM COATED ORAL at 01:54

## 2018-08-05 RX ADMIN — Medication 10 ML: at 20:58

## 2018-08-05 RX ADMIN — ACETAMINOPHEN 650 MG: 325 TABLET ORAL at 16:56

## 2018-08-05 ASSESSMENT — PAIN SCALES - GENERAL
PAINLEVEL_OUTOF10: 2
PAINLEVEL_OUTOF10: 0
PAINLEVEL_OUTOF10: 4

## 2018-08-05 NOTE — H&P
history. Medications Prior to Admission:      Prior to Admission medications    Medication Sig Start Date End Date Taking?  Authorizing Provider   levETIRAcetam (KEPPRA) 500 MG tablet Take 1 tablet by mouth 2 times daily 3/26/17   Leita Sis, DO   amLODIPine (NORVASC) 5 MG tablet Take 2 tablets by mouth daily 3/26/17   Leita Sis, DO   aspirin 81 MG tablet Take 81 mg by mouth daily    Historical Provider, MD   simvastatin (ZOCOR) 20 MG tablet Take 20 mg by mouth nightly    Historical Provider, MD   phenytoin (DILANTIN) 100 MG ER capsule Take 100 mg by mouth 3 times daily    Historical Provider, MD   sertraline (ZOLOFT) 50 MG tablet Take 50 mg by mouth daily    Historical Provider, MD   furosemide (LASIX) 40 MG tablet Take 40 mg by mouth daily    Historical Provider, MD   potassium chloride (KLOR-CON M) 10 MEQ extended release tablet Take 1 tablet by mouth daily  Patient taking differently: Take 10 mEq by mouth daily  12/6/16   Kassie Vasquez MD   isosorbide mononitrate (IMDUR) 30 MG extended release tablet Take 1 tablet by mouth daily 12/6/16   Kassie Vasquez MD   carvedilol (COREG) 12.5 MG tablet Take 1 tablet by mouth 2 times daily (with meals) 12/6/16   Kassie Vasquez MD   simvastatin (ZOCOR) 20 MG tablet Take 1 tablet by mouth nightly 12/6/16   Kassie Vasquez MD   amLODIPine-benazepril (LOTREL) 5-10 MG per capsule Take 1 capsule by mouth daily 12/6/16   Kassie Vasquez MD   furosemide (LASIX) 40 MG tablet Take 1 tablet by mouth daily 12/6/16   Kassie Vasquez MD   spironolactone (ALDACTONE) 25 MG tablet Take 1 tablet by mouth daily 12/6/16   Kassie Vasquez MD   carvedilol (COREG) 12.5 MG tablet Take 1 tablet by mouth 2 times daily 10/26/16   Kassie Vasquez MD   isosorbide mononitrate (IMDUR) 30 MG extended release tablet Take 1 tablet by mouth daily 10/26/16   Kassie Vasquez MD   spironolactone (ALDACTONE) 25 MG tablet Take 1 tablet by mouth daily 10/26/16   Kassie Vasquez MD   potassium chloride non-focal.  Psychiatric:  Alert and oriented x 1, MRDD  Capillary Refill: Brisk,< 3 seconds   Peripheral Pulses: +2 palpable, equal bilaterally       Labs:     Recent Labs      08/04/18   1630   WBC  5.2   HGB  14.5   HCT  42.7   PLT  154     Recent Labs      08/04/18   1630   NA  139   K  4.7   CL  102   CO2  25   BUN  26*   CREATININE  1.00*   CALCIUM  8.9     Recent Labs      08/04/18   1630   AST  17   ALT  19   BILITOT  0.3   ALKPHOS  141*     Recent Labs      08/04/18   1630   INR  1.1     Recent Labs      08/04/18   1630   CKTOTAL  51       Urinalysis:      Lab Results   Component Value Date    NITRU POSITIVE 08/04/2018    WBCUA  08/04/2018    BACTERIA Many 08/04/2018    RBCUA 0-2 08/04/2018    BLOODU TRACE 08/04/2018    SPECGRAV 1.015 08/04/2018    GLUCOSEU Negative 08/04/2018       Radiology:     CXR: I have reviewed the CXR with the following interpretation: pending  EKG:  I have reviewed the EKG with the following interpretation: pending    XR CHEST PORTABLE   Final Result   MILD CARDIAC ENLARGEMENT WITH MILD CENTRAL VASCULAR CONGESTION. SMALL LEFT-SIDED PLEURAL EFFUSION. SMALL INFILTRATE/ATELECTASIS IN THE LUNG BASES. CT HEAD WO CONTRAST   Final Result      AGE-RELATED FINDINGS, NO ACUTE ABNORMALITY. CEREBRAL ATROPHY UNCHANGED. SOFT TISSUE SWELLING LEFT PARIETAL SCALP. CT OF THE CERVICAL SPINE:      COMPARISONS:  NONE      REASON FOR EXAMINATION:  See above. TECHNIQUE:  Thin axial sections were obtained through the cervical spine. Images were reformatted in the coronal and sagittal projections. FINDINGS: Bones are demineralized. There is good alignment of the spine present. No fracture nor spondylolisthesis is seen. There is narrowing at the C3-4 and C4-5, C5-6 and C6-7 disc levels. The prevertebral soft tissues are unremarkable. Pleural    thickening in apices. Dilated esophagus with air-fluid level.  There is a 2 cm nodule within the thyroid isthmus. C3/C4: Narrowed disc with posterior bony spurring. Also anterior spurring present. C4/5:  Narrowed discs with posterior spurring and anterior spurring. Right posterior lateral bony spurring resulting in some narrowing of the right neural foramen. C5/6:   Narrowed disc with posterior bony spurring. Bony spurs. Neural foramina bilaterally with mild narrowing of neural foramina. C6/7:   Narrowed disc with posterior bony spurring. IMPRESSION:      DEGENERATIVE CHANGES WITHOUT EVIDENCE OF FRACTURE OR MALALIGNMENT. DILATED ESOPHAGUS WITH AIR-FLUID LEVEL.      2 CM NODULE WITHIN THYROID ISTHMUS, RECOMMEND CORRELATION WITH ULTRASOUND. All CT scans at this facility use dose modulation, iterative reconstruction, and/or weight based dosing when appropriate to reduce radiation dose to as low as reasonably achievable. CT CERVICAL SPINE WO CONTRAST   Final Result      AGE-RELATED FINDINGS, NO ACUTE ABNORMALITY. CEREBRAL ATROPHY UNCHANGED. SOFT TISSUE SWELLING LEFT PARIETAL SCALP. CT OF THE CERVICAL SPINE:      COMPARISONS:  NONE      REASON FOR EXAMINATION:  See above. TECHNIQUE:  Thin axial sections were obtained through the cervical spine. Images were reformatted in the coronal and sagittal projections. FINDINGS: Bones are demineralized. There is good alignment of the spine present. No fracture nor spondylolisthesis is seen. There is narrowing at the C3-4 and C4-5, C5-6 and C6-7 disc levels. The prevertebral soft tissues are unremarkable. Pleural    thickening in apices. Dilated esophagus with air-fluid level. There is a 2 cm nodule within the thyroid isthmus. C3/C4: Narrowed disc with posterior bony spurring. Also anterior spurring present. C4/5:  Narrowed discs with posterior spurring and anterior spurring. Right posterior lateral bony spurring resulting in some narrowing of the right neural foramen.       C5/6:   Narrowed disc with posterior bony spurring. Bony spurs. Neural foramina bilaterally with mild narrowing of neural foramina. C6/7:   Narrowed disc with posterior bony spurring. IMPRESSION:      DEGENERATIVE CHANGES WITHOUT EVIDENCE OF FRACTURE OR MALALIGNMENT. DILATED ESOPHAGUS WITH AIR-FLUID LEVEL.      2 CM NODULE WITHIN THYROID ISTHMUS, RECOMMEND CORRELATION WITH ULTRASOUND. All CT scans at this facility use dose modulation, iterative reconstruction, and/or weight based dosing when appropriate to reduce radiation dose to as low as reasonably achievable. ASSESSMENT:    Active Hospital Problems    Diagnosis Date Noted    Fall [W19. XXXA] 08/04/2018       PLAN:    1. Frequent falls- patient in with her second fall in 2 weeks, laceration repaired in ED, will monitor neuro status, CT image negative for acute process, fall and seizure precautions  2. Acute cystitis without hematuria- mostly contaminated urine sample, but patient does complain of lower abdominal pain and points to suprapubic area, will medicated with IV rocephin, culture pending, anticipate it will be contaminated as well. 3. Seizures- precautions in place will restart home medications, phenytoin level slightly elevated above normal level. 4. HTN- elevated, most likely 2/2 to head pain?, will restart cardiac medications and monitor vitals  5. H/O MRDD    DVT Prophylaxis: lovenox  Diet:  regular  Code Status: Prior      Dispo - observation        HOMER Montero - CNP    Attending's Note:  Pt was seen and evaluated and discussed care with NP. I agree with the above assessment and plan. Patient was admitted because of the ER physician insisting that the patient needs to be admitting for over salvation and questionable vomiting. When patient was seen on the floor, she was not salivating and she seemed at her baseline. She mentioned chronic lower abdominal pain.  UA is positive for possible UTI but it is likely contamination/bacturia. I will continue Rocephin for now but likely patient wont need it on discharge when the culture's results are back.      Mona Aleman, [address] [facility]

## 2018-08-05 NOTE — PROGRESS NOTES
Hospitalist Progress Note  8/5/2018 3:20 PM      Assessment and Plan:   1. Concussion without LOC and with vomiting due to unwitnessed fall at [facility]: Continue monitoring, neuro checks. Fall precautions. Aspiration precautions. 2. Closed head injury due to fall with pain: Sutures intact, scheduling tylenol  3. Syncope collapse, Concern for cardiogenic etiology with 2:1 transient Heart Block seen on Telemetry: Cardio consulted. Echo ordered, check orthostatic VS, telemetry. Neuro consulted, neuro checks Q4hrs, Seizure precautions. Fall precautions  4. Seizure disorder with elevated phenytoin level: Seizure precautions. Resume home meds. Monitor Phenytoin level. Neuro checks. 5. MRDD: at baseline. 6. Abnormal UA: likely colonized by Nitrite positive bacteria in setting of a lifetime of improper wiping technique with learning disability. PVR negative. Monitor for discomfort with urination. No S/S at this time. 7. CKD stage III: Stable. Monitor urine output. Check BMP in AM. NephroCaps. Check Vit D level and supplement if deficient. 8. Chronic diastolic HF with aortic regurgitation: daily weights, Goal K and Mg 4.0 and 2.0, respectively. Telemetry. 9. Functional Status: Fall precautions. 10. Bowel Regimen: stool softners PRN ordered   11. Diet: DIET GENERAL;  12. Advance Directive: Full Code   13. DVT prophylaxis: Lovenox   14. Discharge planning: SW on board. Will discharge once medically stable and cleared by all consultants. 15. High Risk Readmission Screening Tool Score Noted.      Additionally, the following hospital problems were addressed:  Principal Problem (Resolved):    Concussion  Active Problems:    Chronic CHF ([address]    Developmental disability    Mental retardation    Seizure disorder ([facility] [address]    Fall    History of recurrent UTI (urinary tract infection)    Elevated phenytoin level    Aortic regurgitation    CKD (chronic kidney disease), stage III    Atelectasis, bilateral    Observation for suspected concussion    HB (heart block)    Concussion syndrome      ** Total time spent reviewing medical records, evaluating patient, speaking with RN's and consultants where I was focused exclusively on this patient: 35 minutes. Subjective:   Admit Date: 8/4/2018  PCP: No primary care provider on file. No acute events overnight. Telemetry reviewed. Had 2:1 HB reported by telemetry room. Pt points to left ear when asked about pain. Answers questions with short answers. Objective:     Vitals:    08/04/18 2022 08/04/18 2130 08/05/18 0100 08/05/18 0430   BP: (!) 170/60 (!) 153/43 (!) 179/50 (!) 154/43   Pulse: 72 76 83 72   Resp: 18 18 18 16   Temp:  98.1 °F (36.7 °C) 98.3 °F (36.8 °C) 98.4 °F (36.9 °C)   TempSrc:  Oral Oral Oral   SpO2: 99% 97%  92%   Weight:       Height:         General appearance: Mild acute distress,  Alert, answers questions appropriately with yes and no, \"yep\" or non verbally. Good dentition (+) two clean intact stitches on left ear (+) ecchymosis behind left ear  Lungs: CTAB Diminishedno exp wheezes, no rales , No rhonchi. No retractions; No use of accessory muscles  Heart:  S1, S2 normal, RRR, no MRG appreciated  Abdomen: (+) BS, soft, non-tender; non distended no guarding or rigidity. Extremities:  no cyanosis, trace edema bilat lower exts, no calf tenderness bilaterally.  Dry skin noted      Medications:      levETIRAcetam  1,000 mg Oral Daily    carvedilol  12.5 mg Oral BID    isosorbide mononitrate  30 mg Oral Daily    spironolactone  25 mg Oral Daily    aspirin  81 mg Oral Daily    simvastatin  20 mg Oral Nightly    furosemide  40 mg Oral Daily    therapeutic multivitamin-minerals  1 tablet Oral Daily    acetaminophen  650 mg Oral 3 times per day    sodium chloride flush  10 mL Intravenous 2 times per day    enoxaparin  40 mg Subcutaneous Daily    cefTRIAXone (ROCEPHIN) IV  1 g Intravenous Q24H       LABS Reviewed    IMAGING Reviewed    Roshni Shin

## 2018-08-06 LAB
ANION GAP SERPL CALCULATED.3IONS-SCNC: 15 MEQ/L (ref 7–13)
BUN BLDV-MCNC: 38 MG/DL (ref 8–23)
CALCIUM SERPL-MCNC: 8.3 MG/DL (ref 8.6–10.2)
CHLORIDE BLD-SCNC: 105 MEQ/L (ref 98–107)
CO2: 21 MEQ/L (ref 22–29)
CREAT SERPL-MCNC: 1.56 MG/DL (ref 0.5–0.9)
EKG ATRIAL RATE: 71 BPM
EKG ATRIAL RATE: 79 BPM
EKG P AXIS: 46 DEGREES
EKG P AXIS: 47 DEGREES
EKG P-R INTERVAL: 190 MS
EKG P-R INTERVAL: 210 MS
EKG Q-T INTERVAL: 412 MS
EKG Q-T INTERVAL: 416 MS
EKG QRS DURATION: 86 MS
EKG QRS DURATION: 88 MS
EKG QTC CALCULATION (BAZETT): 452 MS
EKG QTC CALCULATION (BAZETT): 472 MS
EKG R AXIS: 0 DEGREES
EKG R AXIS: 5 DEGREES
EKG T AXIS: 57 DEGREES
EKG T AXIS: 70 DEGREES
EKG VENTRICULAR RATE: 71 BPM
EKG VENTRICULAR RATE: 79 BPM
GFR AFRICAN AMERICAN: 38
GFR NON-AFRICAN AMERICAN: 31.4
GLUCOSE BLD-MCNC: 92 MG/DL (ref 74–109)
HCT VFR BLD CALC: 37.4 % (ref 37–47)
HEMOGLOBIN: 12.8 G/DL (ref 12–16)
MCH RBC QN AUTO: 32.4 PG (ref 27–31.3)
MCHC RBC AUTO-ENTMCNC: 34.3 % (ref 33–37)
MCV RBC AUTO: 94.3 FL (ref 82–100)
PDW BLD-RTO: 13.6 % (ref 11.5–14.5)
PLATELET # BLD: 125 K/UL (ref 130–400)
POTASSIUM REFLEX MAGNESIUM: 4.3 MEQ/L (ref 3.5–5.1)
RBC # BLD: 3.97 M/UL (ref 4.2–5.4)
SODIUM BLD-SCNC: 141 MEQ/L (ref 132–144)
WBC # BLD: 3.9 K/UL (ref 4.8–10.8)

## 2018-08-06 PROCEDURE — 2580000003 HC RX 258: Performed by: NURSE PRACTITIONER

## 2018-08-06 PROCEDURE — 80048 BASIC METABOLIC PNL TOTAL CA: CPT

## 2018-08-06 PROCEDURE — 93005 ELECTROCARDIOGRAM TRACING: CPT

## 2018-08-06 PROCEDURE — 85027 COMPLETE CBC AUTOMATED: CPT

## 2018-08-06 PROCEDURE — 2580000003 HC RX 258: Performed by: INTERNAL MEDICINE

## 2018-08-06 PROCEDURE — 97162 PT EVAL MOD COMPLEX 30 MIN: CPT

## 2018-08-06 PROCEDURE — 99223 1ST HOSP IP/OBS HIGH 75: CPT | Performed by: INTERNAL MEDICINE

## 2018-08-06 PROCEDURE — 6360000002 HC RX W HCPCS: Performed by: NURSE PRACTITIONER

## 2018-08-06 PROCEDURE — 1210000000 HC MED SURG R&B

## 2018-08-06 PROCEDURE — G8979 MOBILITY GOAL STATUS: HCPCS

## 2018-08-06 PROCEDURE — APPNB45 APP NON BILLABLE 31-45 MINUTES: Performed by: NURSE PRACTITIONER

## 2018-08-06 PROCEDURE — 36415 COLL VENOUS BLD VENIPUNCTURE: CPT

## 2018-08-06 PROCEDURE — 6370000000 HC RX 637 (ALT 250 FOR IP): Performed by: INTERNAL MEDICINE

## 2018-08-06 PROCEDURE — G8978 MOBILITY CURRENT STATUS: HCPCS

## 2018-08-06 PROCEDURE — 6360000002 HC RX W HCPCS: Performed by: INTERNAL MEDICINE

## 2018-08-06 RX ORDER — SODIUM CHLORIDE 9 MG/ML
INJECTION, SOLUTION INTRAVENOUS CONTINUOUS
Status: DISCONTINUED | OUTPATIENT
Start: 2018-08-06 | End: 2018-08-06

## 2018-08-06 RX ORDER — SODIUM CHLORIDE 9 MG/ML
INJECTION, SOLUTION INTRAVENOUS CONTINUOUS
Status: DISPENSED | OUTPATIENT
Start: 2018-08-06 | End: 2018-08-06

## 2018-08-06 RX ADMIN — SPIRONOLACTONE 25 MG: 25 TABLET ORAL at 12:15

## 2018-08-06 RX ADMIN — LEVETIRACETAM 1000 MG: 500 TABLET ORAL at 12:15

## 2018-08-06 RX ADMIN — ENOXAPARIN SODIUM 40 MG: 40 INJECTION SUBCUTANEOUS at 12:16

## 2018-08-06 RX ADMIN — CEFTRIAXONE SODIUM 1 G: 1 INJECTION, POWDER, FOR SOLUTION INTRAMUSCULAR; INTRAVENOUS at 18:25

## 2018-08-06 RX ADMIN — ACETAMINOPHEN 650 MG: 325 TABLET ORAL at 16:52

## 2018-08-06 RX ADMIN — SIMVASTATIN 20 MG: 20 TABLET, FILM COATED ORAL at 23:08

## 2018-08-06 RX ADMIN — ISOSORBIDE MONONITRATE 30 MG: 30 TABLET, EXTENDED RELEASE ORAL at 12:16

## 2018-08-06 RX ADMIN — SODIUM CHLORIDE: 9 INJECTION, SOLUTION INTRAVENOUS at 15:14

## 2018-08-06 RX ADMIN — MULTIPLE VITAMINS W/ MINERALS TAB 1 TABLET: TAB at 12:16

## 2018-08-06 RX ADMIN — SODIUM CHLORIDE: 9 INJECTION, SOLUTION INTRAVENOUS at 03:08

## 2018-08-06 RX ADMIN — ACETAMINOPHEN 650 MG: 325 TABLET ORAL at 23:08

## 2018-08-06 RX ADMIN — Medication 10 ML: at 23:12

## 2018-08-06 RX ADMIN — ACETAMINOPHEN 650 MG: 325 TABLET ORAL at 06:14

## 2018-08-06 RX ADMIN — ASPIRIN 81 MG 81 MG: 81 TABLET ORAL at 12:15

## 2018-08-06 ASSESSMENT — PAIN SCALES - GENERAL
PAINLEVEL_OUTOF10: 0
PAINLEVEL_OUTOF10: 4

## 2018-08-06 ASSESSMENT — PAIN SCALES - WONG BAKER
WONGBAKER_NUMERICALRESPONSE: 0

## 2018-08-06 NOTE — PROGRESS NOTES
[F07.81] 08/05/2018    Fall [F67. XXXA] 08/04/2018    Seizure disorder ([facility] [T68.131] 03/24/2017    Mental retardation [F79] 03/24/2017    Developmental disability [F89] 05/27/2016    Chronic CHF ([facility] [I50.9] 05/27/2016     - Syncope and Collapse: probably secondary to dehydration vs orthostatic hypotension + cardiac etiology. Given 2:1 heart block. Cardiology following. Will observe for 24-48 hours as per cardio and assess need for PPM    - MRDD: at baseline    - Abnormal UA with positive urine cultures results: Patient however was reported at baseline mental status on admission. Patient was afebrile, and no leukocytosis on admission. It is highly likely that it is contamination. Will give one more dose of ceftriaxone today and then d/c    - Seizure disorder: on Phenytoin. Will continue. Seizure precautions    - CKD stage 3: JOSHUA on CKD. Repeat BMP in AM. Will hold lasix and give IVF. - Chronic diastolic HF with aortic regurgitation: daily weights, Goal K and Mg 4.0 and 2.0, respectively. Telemetry.     DVT prophylaxis: Cecilio Spicer MD  Pager : [phone]

## 2018-08-06 NOTE — CONSULTS
Consult Note  Patient: Mica Rm  Unit/Bed: F287/O603-09  YOB: 1931  MRN: [id number]  Acct: [de-identified]   Admitting Diagnosis: Fall [W19. XXXA]  Concussion syndrome [F07.81]  Admit Date:  8/4/2018  Hospital Day: 1      Chief Complaint:  S/p fall    History of Present Illness:  66-year-old female was admitted into the emergency department status post fall at home. She was very poor historian she's got a history of MRDD, chronic diastolic heart failure, chronic kidney disease, hypertension, dyslipidemia, seizure disorder. Again she is unable to answer any questions majority of this information is from the staff and from the chart. She was at home had a fall came into the emergency department then put some staples in her head to admit her up to [address] she had an episode of the approximately 4 seconds where she had the R waves heart block. She has seen Dr. Emily Garcia in the past her last echo was done in the 2 years ago and it showed at that time her EF was normal but she had some diastolic dysfunction. plan to hold coreg at this time.      No Known Allergies    Current Facility-Administered Medications   Medication Dose Route Frequency Provider Last Rate Last Dose    0.9 % sodium chloride infusion   Intravenous Continuous Siva Grayson MD 75 mL/hr at 08/06/18 0308      levETIRAcetam (KEPPRA) tablet 1,000 mg  1,000 mg Oral Daily Siva Grayson MD   1,000 mg at 08/05/18 6258    isosorbide mononitrate (IMDUR) extended release tablet 30 mg  30 mg Oral Daily Siva Grayson MD   30 mg at 08/05/18 6916    spironolactone (ALDACTONE) tablet 25 mg  25 mg Oral Daily Siva Grayson MD   25 mg at 08/05/18 6991    aspirin chewable tablet 81 mg  81 mg Oral Daily Siva Grayson MD   81 mg at 08/05/18 8130    simvastatin (ZOCOR) tablet 20 mg  20 mg Oral Nightly Siva Grayson MD   20 mg at 08/05/18 5416    furosemide (LASIX) tablet 40 mg  40 mg Oral Daily Siva Grayson MD   40 mg at 08/05/18 5337    Other Topics Concern    None     Social History Narrative    ** Merged History Encounter **            Family Hx:  Family History   Problem Relation Age of Onset    Family history unknown: Yes       Review of Systems:   Review of Systems   Unable to perform ROS: Acuity of condition         Physical Examination:    BP (!) 134/40   Pulse 75   Temp 99.3 °F (37.4 °C) (Oral)   Resp 16   Ht 5' 3\" (1.6 m)   Wt 150 lb (68 kg)   SpO2 93%   BMI 26.57 kg/m²    Physical Exam   Constitutional: Vital signs are normal.   Hx MRDD   Cardiovascular: Normal rate, regular rhythm and intact distal pulses. Murmur heard. Systolic murmur is present with a grade of 2/6   Pulmonary/Chest: Effort normal and breath sounds normal. No respiratory distress. Abdominal: Soft. Bowel sounds are normal. She exhibits no distension. Musculoskeletal: She exhibits no edema. Neurological:   awake   Skin: Skin is warm and dry.        LABS:  CBC:   Lab Results   Component Value Date    WBC 3.9 08/06/2018    RBC 3.97 08/06/2018    RBC 4.68 01/26/2012    HGB 12.8 08/06/2018    HCT 37.4 08/06/2018    MCV 94.3 08/06/2018    MCH 32.4 08/06/2018    MCHC 34.3 08/06/2018    RDW 13.6 08/06/2018     08/06/2018    MPV 9.7 03/22/2014     CBC with Differential:    Lab Results   Component Value Date    WBC 3.9 08/06/2018    RBC 3.97 08/06/2018    RBC 4.68 01/26/2012    HGB 12.8 08/06/2018    HCT 37.4 08/06/2018     08/06/2018    MCV 94.3 08/06/2018    MCH 32.4 08/06/2018    MCHC 34.3 08/06/2018    RDW 13.6 08/06/2018    BANDSPCT 34 03/16/2016    METASPCT 2 03/16/2016    LYMPHOPCT 15.6 08/04/2018    MONOPCT 6.3 08/04/2018    EOSPCT 3.0 01/26/2012    BASOPCT 0.7 08/04/2018    MONOSABS 0.3 08/04/2018    LYMPHSABS 0.8 08/04/2018    EOSABS 0.1 08/04/2018    BASOSABS 0.0 08/04/2018     CMP:    Lab Results   Component Value Date     08/06/2018    K 4.3 08/06/2018     08/06/2018    CO2 21 08/06/2018    BUN 38 08/06/2018 potassium levels and keep greater than 4.0  6. Thank you for allowing me to participate in the care of your patient, please don't hesitate to contact me if you have any further questions. Electronically signed by HOMER Malhotra CNP on 8/6/2018 at 9:07 AM        Attending Supervising [de-identified] Attestation Statement  The patient is a 80 y.o. female. I have performed a history and physical examination of the patient. I discussed the case with the nurse practitioner. I reviewed the patient's Past Medical History, Past Surgical History, Medications, and Allergies.      Physical Exam:  Vitals:    08/05/18 0100 08/05/18 0430 08/05/18 2046 08/06/18 0030   BP: (!) 179/50 (!) 154/43 (!) 159/56 (!) 134/40   Pulse: 83 72  75   Resp: 18 16  16   Temp: 98.3 °F (36.8 °C) 98.4 °F (36.9 °C)  99.3 °F (37.4 °C)   TempSrc: Oral Oral  Oral   SpO2:  92%  93%   Weight:       Height:           Review of Systems - Respiratory ROS: negative  Cardiovascular ROS: no chest pain or dyspnea on exertion  Gastrointestinal ROS: positive for - abdominal pain    Pulmonary/Chest: clear to auscultation bilaterally- no wheezes, rales or rhonchi, normal air movement, no respiratory distress  Cardiovascular: normal rate, normal S1 and S2, no gallops, intact distal pulses and no carotid bruits  Abdomen: soft, non-tender, non-distended, normal bowel sounds, no masses or organomegaly    Active Hospital Problems    Diagnosis Date Noted    History of recurrent UTI (urinary tract infection) [Z87.440] 08/05/2018     Priority: Low    Elevated phenytoin level [R78.89] 08/05/2018     Priority: Low    Aortic regurgitation [I35.1] 08/05/2018     Priority: Low    CKD (chronic kidney disease), stage III [N18.3] 08/05/2018     Priority: Low    Atelectasis, bilateral [J98.11] 08/05/2018     Priority: Low    Observation for suspected concussion [Z04.8] 08/05/2018     Priority: Low    HB (heart block) [I45.9] 08/05/2018     Priority: Low   

## 2018-08-06 NOTE — PROGRESS NOTES
Nutrition Assessment    Type and Reason for Visit: Initial, Positive Nutrition Screen    Nutrition Recommendations: Continue current diet, Start ONS (Add high calorie oral supplement tid). Please obtain actual wt. Malnutrition Assessment:  · Malnutrition Status: No malnutrition  · Context: Chronic illness    Nutrition Diagnosis:   · Problem: Inadequate oral intake  · Etiology: related to Cognitive or neurological impairment (current condition)     Signs and symptoms:  as evidenced by Intake 0-25%    Nutrition Assessment:  · Subjective Assessment: PO intake ~25% at [facility] today, but then vomited after drinking orange juice quickly. Unable to interview pt. · Nutrition-Focused Physical Findings: No edema noted. · Wound Type: None  · Current Nutrition Therapies:  · Oral Diet Orders: General   · Oral Diet intake: Unable to assess (25% at [facility])  · Oral Nutrition Supplement (ONS) Orders: None  · Anthropometric Measures:  · Ht: 5' 3\" (160 cm)   · Current Body Wt:  n/a  · Admission Body Wt: 150 lb (68 kg) (estimated)  · Usual Body Wt: 135 lb (61.2 kg) (03/2017 actual)/145lb (estim 7/19)  · % Weight Change: none,     · Ideal Body Wt: 115 lb (52.2 kg), % Ideal Body 130%  · BMI Classification: BMI 25.0 - 29.9 Overweight (using estimated wt on adm)  · Comparative Standards (Estimated Nutrition Needs):  · Estimated Daily Total Kcal: 2006-9337  · Estimated Daily Protein (g): 52-62    Estimated Intake vs Estimated Needs: Intake Less Than Needs    Nutrition Risk Level: High    Nutrition Interventions:   Continue current diet, Start ONS (Add high calorie oral supplement tid)  Continued Inpatient Monitoring    Nutrition Evaluation:   · Evaluation: Goals set   · Goals: Intake >50% of meals/supplement. Stable weight. Please obtain actual wt. · Monitoring: Meal Intake, Supplement Intake, Weight, Pertinent Labs    See Adult Nutrition Doc Flowsheet for more detail.      Electronically signed by Livier Cleaning RD, ROSALIA on 8/6/18 at 1:59 PM

## 2018-08-06 NOTE — CARE COORDINATION
[facility] pt, to review [place] plan. Left message. Phone call back from Sarah Bentley,  of [place] and also caregiver for pt. Requested POA papers which they do not have. Pt has lived with them for 30 years and they are trying to obtain Guardianship. He states pt has no family. Plan remains return to home with them. He is declining HHC at [place], stating that pt is at baseline. She is generally wheelchair bound, but they do walk her and transfer her with a gait belt. They also do so at [facility]. He does not feel HHC would be beneficial at this point. Pt has all necessary DME. She also utilizes [facility]. Reviewed IMM.

## 2018-08-06 NOTE — PROGRESS NOTES
[address]: Contact guard assistance  Quality of Gait: wide JAMES, easily distracted, tends to run into objects with FWW  Distance: 40 ft  Comments: Unsure of PLOF, but pt appears to not be used to using FWW with ambulation               ASSESSMENT:   Body structures, Functions, Activity limitations: Decreased functional mobility ; Decreased strength;Decreased endurance;Decreased safe awareness;Decreased balance  Decision Making: Medium Complexity    Prognosis: Good  Patient Education: Pt educated with use of call light  Barriers to Learning: decreased cognitive status    DISCHARGE RECOMMENDATIONS:  Discharge Recommendations: Continue to assess pending progress, Patient would benefit from continued therapy after discharge    Assessment: Pt demonstrates the above deficits and decline in functional mobility status placing them at increased risk for falls. Pt would benefit from physical therapy to address above deficits and allow for safe return home at highest level of function, decrease risk for falls, and improve QOL. REQUIRES PT FOLLOW UP: Yes      PLAN OF CARE:  Plan  Times per week: 3-6x/wk  Current Treatment Recommendations: Strengthening, Balance Training, Transfer Training, Neuromuscular Re-education, Home Exercise Program, Patient/Caregiver Education & Training, Modalities, Gait Training, Cognitive Reorientation, Safety Education & Training, Stair training, Endurance Training  Safety Devices  Type of devices: All fall risk precautions in place, Bed alarm in place, Nurse notified    G-Code:  PT G-Codes  Functional Assessment Tool Used: clinical judgement  Functional Limitation: Mobility: Walking and moving around  Mobility: Walking and Moving Around Current Status (): At least 20 percent but less than 40 percent impaired, limited or restricted  Mobility: Walking and Moving Around Goal Status ():  At least 1 percent but less than 20 percent impaired, limited or

## 2018-08-07 LAB
ANION GAP SERPL CALCULATED.3IONS-SCNC: 12 MEQ/L (ref 7–13)
BUN BLDV-MCNC: 22 MG/DL (ref 8–23)
CALCIUM SERPL-MCNC: 8.1 MG/DL (ref 8.6–10.2)
CHLORIDE BLD-SCNC: 110 MEQ/L (ref 98–107)
CO2: 21 MEQ/L (ref 22–29)
CREAT SERPL-MCNC: 1.01 MG/DL (ref 0.5–0.9)
GFR AFRICAN AMERICAN: >60
GFR NON-AFRICAN AMERICAN: 51.8
GLUCOSE BLD-MCNC: 86 MG/DL (ref 74–109)
HCT VFR BLD CALC: 36.1 % (ref 37–47)
HEMOGLOBIN: 12.4 G/DL (ref 12–16)
LV EF: 60 %
LVEF MODALITY: NORMAL
MCH RBC QN AUTO: 32.4 PG (ref 27–31.3)
MCHC RBC AUTO-ENTMCNC: 34.4 % (ref 33–37)
MCV RBC AUTO: 94.1 FL (ref 82–100)
ORGANISM: ABNORMAL
PDW BLD-RTO: 13.4 % (ref 11.5–14.5)
PLATELET # BLD: 119 K/UL (ref 130–400)
POTASSIUM REFLEX MAGNESIUM: 4.3 MEQ/L (ref 3.5–5.1)
RBC # BLD: 3.83 M/UL (ref 4.2–5.4)
SODIUM BLD-SCNC: 143 MEQ/L (ref 132–144)
URINE CULTURE, ROUTINE: ABNORMAL
URINE CULTURE, ROUTINE: ABNORMAL
WBC # BLD: 3.5 K/UL (ref 4.8–10.8)

## 2018-08-07 PROCEDURE — 97116 GAIT TRAINING THERAPY: CPT

## 2018-08-07 PROCEDURE — 36415 COLL VENOUS BLD VENIPUNCTURE: CPT

## 2018-08-07 PROCEDURE — 1210000000 HC MED SURG R&B

## 2018-08-07 PROCEDURE — 93010 ELECTROCARDIOGRAM REPORT: CPT | Performed by: INTERNAL MEDICINE

## 2018-08-07 PROCEDURE — G8987 SELF CARE CURRENT STATUS: HCPCS

## 2018-08-07 PROCEDURE — 6370000000 HC RX 637 (ALT 250 FOR IP): Performed by: INTERNAL MEDICINE

## 2018-08-07 PROCEDURE — 6370000000 HC RX 637 (ALT 250 FOR IP): Performed by: NURSE PRACTITIONER

## 2018-08-07 PROCEDURE — 93306 TTE W/DOPPLER COMPLETE: CPT

## 2018-08-07 PROCEDURE — 6360000002 HC RX W HCPCS: Performed by: NURSE PRACTITIONER

## 2018-08-07 PROCEDURE — 2580000003 HC RX 258: Performed by: NURSE PRACTITIONER

## 2018-08-07 PROCEDURE — 99233 SBSQ HOSP IP/OBS HIGH 50: CPT | Performed by: INTERNAL MEDICINE

## 2018-08-07 PROCEDURE — 97167 OT EVAL HIGH COMPLEX 60 MIN: CPT

## 2018-08-07 PROCEDURE — 85027 COMPLETE CBC AUTOMATED: CPT

## 2018-08-07 PROCEDURE — 80048 BASIC METABOLIC PNL TOTAL CA: CPT

## 2018-08-07 PROCEDURE — APPNB15 APP NON BILLABLE TIME 0-15 MINS: Performed by: NURSE PRACTITIONER

## 2018-08-07 RX ORDER — AMLODIPINE BESYLATE 5 MG/1
5 TABLET ORAL DAILY
Status: DISCONTINUED | OUTPATIENT
Start: 2018-08-07 | End: 2018-08-08

## 2018-08-07 RX ORDER — LOSARTAN POTASSIUM 50 MG/1
100 TABLET ORAL DAILY
Status: DISCONTINUED | OUTPATIENT
Start: 2018-08-07 | End: 2018-08-09 | Stop reason: HOSPADM

## 2018-08-07 RX ADMIN — SPIRONOLACTONE 25 MG: 25 TABLET ORAL at 09:14

## 2018-08-07 RX ADMIN — LEVETIRACETAM 1000 MG: 500 TABLET ORAL at 09:14

## 2018-08-07 RX ADMIN — ISOSORBIDE MONONITRATE 30 MG: 30 TABLET, EXTENDED RELEASE ORAL at 09:14

## 2018-08-07 RX ADMIN — AMLODIPINE BESYLATE 5 MG: 5 TABLET ORAL at 12:22

## 2018-08-07 RX ADMIN — ACETAMINOPHEN 650 MG: 325 TABLET ORAL at 22:19

## 2018-08-07 RX ADMIN — ACETAMINOPHEN 650 MG: 325 TABLET ORAL at 05:42

## 2018-08-07 RX ADMIN — LOSARTAN POTASSIUM 100 MG: 50 TABLET ORAL at 12:22

## 2018-08-07 RX ADMIN — ASPIRIN 81 MG 81 MG: 81 TABLET ORAL at 09:15

## 2018-08-07 RX ADMIN — Medication 10 ML: at 22:19

## 2018-08-07 RX ADMIN — Medication 10 ML: at 09:15

## 2018-08-07 RX ADMIN — MULTIPLE VITAMINS W/ MINERALS TAB 1 TABLET: TAB at 09:14

## 2018-08-07 RX ADMIN — ENOXAPARIN SODIUM 40 MG: 40 INJECTION SUBCUTANEOUS at 09:14

## 2018-08-07 RX ADMIN — SIMVASTATIN 20 MG: 20 TABLET, FILM COATED ORAL at 22:19

## 2018-08-07 ASSESSMENT — PAIN SCALES - GENERAL
PAINLEVEL_OUTOF10: 0

## 2018-08-07 NOTE — PROGRESS NOTES
[facility] OCCUPATIONAL THERAPY EVALUATION - ACUTE     Date: 2018  Patient Name: Ryann Garcias        MRN: [id number]  Account: [de-identified]   : 1931  (80 y.o.)  Room: [place] [address]    Chart Review:  Diagnosis:  The primary encounter diagnosis was Pneumonia of both lower lobes due to infectious organism [facility]). Diagnoses of Acute cystitis without hematuria, Closed head injury, initial encounter, Accidental phenytoin poisoning, initial encounter, and Vomiting without nausea, intractability of vomiting not specified, unspecified vomiting type were also pertinent to this visit. Past Medical History:   Diagnosis Date    CHF (congestive heart failure) (HCC)     Chronic CHF ([facility] [facility] 2016    CKD (chronic kidney disease), stage III 2018    Depression     Developmental disability 2016    Dizzy spells 2016    HTN (hypertension) 2016    Hyperlipidemia     Hypertension     Hypokalemia 2016    Hypoxia 2016    Mental retardation 2016    Mental retardation     Seizures (HCC)     SOB (shortness of breath) 2016    Unstable gait 2016     History reviewed. No pertinent surgical history. Precautions:  Fall   Restrictions/Precautions: Fall Risk    Evaluation and Pt. rights have been reviewed: [x]Yes   [] No   If no why not:   Falls safety interventions in place  [x]Yes   [] No    Comments:     Subjective: \"I have to go to the bathroom\".      Prior living arrangement:      Support contact: Petra- caregiver; [employer]     Pt lives: [] Alone   [] With spouse   [x] Other   Comment:  Petra- caregiver and Petra's   Home: [] Single level   [x]  Two level   []  Split level     []  Apartment:     Entrance:  Stairs: Unable to report Hand rails Unable to report,    Inside: Stairs: Unable to report  Hand rails: unable to report  Bathroom: [] Bath tub   [] Tub/Shower combo   [x]  Shower stall    Location: unable to report    DME: [x] W/W   [] [facility] [] Rollator   [x]  W/C   [] [facility] [place]   [] Shower Chair   [] [facility]  [] Dressing  AE  [] Other:      Previous Functional Status:  \"I don't know\". Pt unable to state PLOF     Pain:   Start of session: 0/10  Description: none   Location: none   End of session: 0/10  Action: [x] No Action Necessary    [] Patient reports pain at acceptable level for treatment  [] Nursing notified    [] Other      Objective: OT eval completed in pts room  Observation:  Alert, pleasant, oriented to self and talked about recent therapy session.   She was proud of herself that she walked in the hallway    Orientation: Oriented to  [x] Person   [] Place  []Time    Vision:   []  WFL   [] Impaired  Comments:   Unable to report- no glasses on at time eval    H[facility]:  [] [facility]   [] Impaired  Comments:    Sensation:   [x] WFL   []  Impaired   Comments:      Cognition:   [] WFL   [x] Impaired  Comments:  MRDD   Communication:   [] WFL   [x] Impaired  Comments: MRDD   Hand Dominance: [] Right   [] Left- unable to report     Range of Motion:  R UE AROM/PROM: [x]  WFL [] Impaired  Comments:   L UE AROM/PROM:  [x]  WFL [] Impaired  Comments:     Strength:   R UE Strength: []1    [] 2   [] 2+   [] 3   [x] 3+   [] 4   [] 4+  [] 5  Comments:   L UE Strength:  []1    [] 2   [] 2+   [] 3   [x] 3+  [] 4   [] 4+   [] 5  Comments:     Quality of Movement:  [] Good   [x] Fair   [] Poor     Coordination:  Gross motor: [] WFL   [x] Impaired   Fine motor: [] WFL   [x] Impaired     Functional Mobility:  Toilet Transfers:  MIN A   Bed Transfer:  MIN A   Sit to stand: MIN A   Bed to Chair:  MIN A     Seated Balance:      Static: [x] Good  [] Fair   [] Poor   Dynamic: []  Good  [x] Fair   [] Poor     Standing Balance:     Static: [x] Good   [x] Fair  [] Poor   Dynamic: [] Good   [] Fair   [x] Poor -LOB x3 with ambulation to the bathroom     Functional Endurance: [] Good  [x] Fair  [] Poor     ADLs  Feeding:   Set-up  UE Dressing:  Simulate CGA   LB Dressing: balance, Decreased high-level IADLs, Decreased coordination  Prognosis: Good  Discharge Recommendations: Continue to assess pending progress  History: Multi-comorbid  Exam: 10 deficits   Assistance / Modification: MIN A    Prognosis:  [x] Good   []Fair   [] Poor     Barriers to Improvement:  Cognition     Six Click Score  How much help for putting on and taking off regular lower body clothing?: A Little  How much help for Bathing?: A Little  How much help for Toileting?: A Little  How much help for putting on and taking off regular upper body clothing?: A Little  How much help for taking care of personal grooming?: A Little  How much help for eating meals?: A Little  AM-[facility] Inpatient Daily Activity Raw Score: 18  AM-PAC Inpatient ADL T-Scale Score : 38.66  ADL Inpatient CMS 0-100% Score: 46.65    Recommended DME:  [x] W/W   [] Cloria Po   [] Rollator   [x] W/C   [x] Irena Echevaria  [x] Shower Chair   []Dressing AD [x]  BSC  [] Other:    Plan:Times per week: 1-3x/week,      G-Codes:  OT G-codes  Functional Limitation: Self care  Self Care Current Status ():  At least 20 percent but less than 40 percent impaired, limited or restricted        Time in:  10:20  Time out:  10:40  Timed treatment minutes:  20  Total treatment time/minutes:  20    Electronically signed by:    CHRISTOPHER Lopez  8/7/2018, 10:50 AM

## 2018-08-07 NOTE — PROGRESS NOTES
of breath) 7/18/2016    Unstable gait 7/19/2016     History reviewed. No pertinent surgical history. Restrictions:  Restrictions/Precautions: Fall Risk    SUBJECTIVE:  Subjective  Subjective: I think I can do a little bit. Pre Pain Assessment:  Pre Treatment Pain Screening  Pain at present: 0          Post Pain Assessment:   Pain Assessment  Pain Level: 0       OBJECTIVE:  Orientation  Overall Orientation Status: Impaired  Orientation Level: Oriented to person      Bed mobility  Rolling to Left: Contact guard assistance  Rolling to Right: Stand by assistance  Supine to Sit: Stand by assistance  Sit to Supine: Stand by assistance  Comment: vc's for technique; tactile cues for change of direction and to stay on task. Transfers  Sit to Stand: Contact guard assistance  Stand to sit: Contact guard assistance  Comment: Pt tends to pull up from [facility]; tactile cues for hand placement - unable to follow verbal cues for safety    Ambulation 1  Surface: level tile  Device: Rolling Walker  Assistance: Contact guard assistance  Quality of Gait: FF posture, WBOS, pushes WW to far out during amb; quick paced and easily distracted; vc's for posture and [facility] mgmt; vc's object avoidance  Distance: 125'          Exercises  Straight Leg Raise: x10  Hip Abduction: x10  Ankle Pumps: x10  Comments: Supine                     ASSESSMENT:  Body structures, Functions, Activity limitations: Decreased functional mobility ; Decreased strength;Decreased endurance;Decreased safe awareness;Decreased balance    Assessment: Good participation with frequent redirection to task at hand.      Activity Tolerance  Activity Tolerance: Patient Tolerated treatment well;Patient limited by cognitive status       Discharge Recommendations:  Continue to assess pending progress, Patient would benefit from continued therapy after discharge    Goals:  Short term goals  Short term goal 1: Pt to demo >fair balance with standing activity   Short term goal 2:

## 2018-08-07 NOTE — PROGRESS NOTES
Hospitalist Progress Note      PCP: No primary care provider on file. Date of Admission: 8/4/2018    Subjective: :  Telemetry with pauses as above  Patient has MRDD and unable to have conversation. Medications:  Reviewed    Infusion Medications   Scheduled Medications    losartan  100 mg Oral Daily    amLODIPine  5 mg Oral Daily    levETIRAcetam  1,000 mg Oral Daily    isosorbide mononitrate  30 mg Oral Daily    spironolactone  25 mg Oral Daily    aspirin  81 mg Oral Daily    simvastatin  20 mg Oral Nightly    therapeutic multivitamin-minerals  1 tablet Oral Daily    acetaminophen  650 mg Oral 3 times per day    sodium chloride flush  10 mL Intravenous 2 times per day    enoxaparin  40 mg Subcutaneous Daily     PRN Meds: sodium chloride flush, magnesium hydroxide, ondansetron, ondansetron      Intake/Output Summary (Last 24 hours) at 08/07/18 1726  Last data filed at 08/07/18 1450   Gross per 24 hour   Intake             1350 ml   Output                0 ml   Net             1350 ml       Exam:    BP (!) 143/52   Pulse 84   Temp 99.3 °F (37.4 °C)   Resp 16   Ht 5' 3\" (1.6 m)   Wt 150 lb (68 kg)   SpO2 97%   BMI 26.57 kg/m²     General appearance: No apparent distress eating dinner. HEENT: Conjunctivae/corneas clear. Neck: Supple, with full range of motion. Respiratory:  Normal respiratory effort. Clear to auscultation, bilaterally without Rales/Wheezes/Rhonchi. Cardiovascular: Regular rate and rhythm with normal S1/S2 without murmurs, rubs or gallops. Abdomen: Soft, non-tender, non-distended with normal bowel sounds. Musculoskeletal: No clubbing, cyanosis or edema bilaterally. Skin: Skin color, texture, turgor normal.  No rashes or lesions. Neuro: moving all extremities.        Labs:   Recent Labs      08/05/18   0510  08/06/18   0500  08/07/18   0506   WBC  4.9  3.9*  3.5*   HGB  13.2  12.8  12.4   HCT  37.3  37.4  36.1*   PLT  135  125*  119*     Recent Labs      08/05/18 0510  08/06/18   0500  08/07/18   0506   NA  141  141  143   K  4.3  4.3  4.3   CL  106  105  110*   CO2  22  21*  21*   BUN  23  38*  22   CREATININE  1.01*  1.56*  1.01*   CALCIUM  8.6  8.3*  8.1*     No results for input(s): AST, ALT, BILIDIR, BILITOT, ALKPHOS in the last 72 hours. No results for input(s): INR in the last 72 hours. No results for input(s): Ramirez [place] in the last 72 hours. Urinalysis:    Lab Results   Component Value Date    NITRU POSITIVE 08/04/2018    WBCUA  08/04/2018    BACTERIA Many 08/04/2018    RBCUA 0-2 08/04/2018    BLOODU TRACE 08/04/2018    SPECGRAV 1.015 08/04/2018    GLUCOSEU Negative 08/04/2018       Radiology:  XR CHEST PORTABLE   Final Result   MILD CARDIAC ENLARGEMENT WITH MILD CENTRAL VASCULAR CONGESTION. SMALL LEFT-SIDED PLEURAL EFFUSION. SMALL INFILTRATE/ATELECTASIS IN THE LUNG BASES. CT HEAD WO CONTRAST   Final Result      AGE-RELATED FINDINGS, NO ACUTE ABNORMALITY. CEREBRAL ATROPHY UNCHANGED. SOFT TISSUE SWELLING LEFT PARIETAL SCALP. CT OF THE CERVICAL SPINE:      COMPARISONS:  NONE      REASON FOR EXAMINATION:  See above. TECHNIQUE:  Thin axial sections were obtained through the cervical spine. Images were reformatted in the coronal and sagittal projections. FINDINGS: Bones are demineralized. There is good alignment of the spine present. No fracture nor spondylolisthesis is seen. There is narrowing at the C3-4 and C4-5, C5-6 and C6-7 disc levels. The prevertebral soft tissues are unremarkable. Pleural    thickening in apices. Dilated esophagus with air-fluid level. There is a 2 cm nodule within the thyroid isthmus. C3/C4: Narrowed disc with posterior bony spurring. Also anterior spurring present. C4/5:  Narrowed discs with posterior spurring and anterior spurring. Right posterior lateral bony spurring resulting in some narrowing of the right neural foramen.       C5/6:   Narrowed disc with

## 2018-08-07 NOTE — PROGRESS NOTES
08/07/2018    MCV 94.1 08/07/2018    MCH 32.4 08/07/2018    MCHC 34.4 08/07/2018    RDW 13.4 08/07/2018     08/07/2018    MPV 9.7 03/22/2014     CBC with Differential:    Lab Results   Component Value Date    WBC 3.5 08/07/2018    RBC 3.83 08/07/2018    RBC 4.68 01/26/2012    HGB 12.4 08/07/2018    HCT 36.1 08/07/2018     08/07/2018    MCV 94.1 08/07/2018    MCH 32.4 08/07/2018    MCHC 34.4 08/07/2018    RDW 13.4 08/07/2018    BANDSPCT 34 03/16/2016    METASPCT 2 03/16/2016    LYMPHOPCT 15.6 08/04/2018    MONOPCT 6.3 08/04/2018    EOSPCT 3.0 01/26/2012    BASOPCT 0.7 08/04/2018    MONOSABS 0.3 08/04/2018    LYMPHSABS 0.8 08/04/2018    EOSABS 0.1 08/04/2018    BASOSABS 0.0 08/04/2018     CMP:    Lab Results   Component Value Date     08/07/2018    K 4.3 08/07/2018     08/07/2018    CO2 21 08/07/2018    BUN 22 08/07/2018    CREATININE 1.01 08/07/2018    GFRAA >60.0 08/07/2018    LABGLOM 51.8 08/07/2018    GLUCOSE 86 08/07/2018    GLUCOSE 110 01/26/2012    PROT 7.7 08/04/2018    LABALBU 4.3 08/04/2018    LABALBU 4.2 01/26/2012    CALCIUM 8.1 08/07/2018    BILITOT 0.3 08/04/2018    ALKPHOS 141 08/04/2018    AST 17 08/04/2018    ALT 19 08/04/2018     BMP:    Lab Results   Component Value Date     08/07/2018    K 4.3 08/07/2018     08/07/2018    CO2 21 08/07/2018    BUN 22 08/07/2018    LABALBU 4.3 08/04/2018    LABALBU 4.2 01/26/2012    CREATININE 1.01 08/07/2018    CALCIUM 8.1 08/07/2018    GFRAA >60.0 08/07/2018    LABGLOM 51.8 08/07/2018    GLUCOSE 86 08/07/2018    GLUCOSE 110 01/26/2012     Magnesium:    Lab Results   Component Value Date    MG 2.4 02/13/2018    MG 2.3 01/26/2012     Troponin:    Lab Results   Component Value Date    TROPONINI <0.010 02/13/2018       Tele NSR 1 degree AV block  No pauses today      Assessment:    Active Hospital Problems    Diagnosis Date Noted    Pneumonia of both lower lobes due to infectious organism [facility]) [J18.1]      Priority: High    distress  Cardiovascular: normal rate, normal S1 and S2, no gallops, intact distal pulses and no carotid bruits  Abdomen: soft, non-tender, non-distended, normal bowel sounds, no masses or organomegaly    Active Hospital Problems    Diagnosis Date Noted    Pneumonia of both lower lobes due to infectious organism [facility]) [J18.1]      Priority: High    History of recurrent UTI (urinary tract infection) [Z87.440] 08/05/2018     Priority: Low    Elevated phenytoin level [R78.89] 08/05/2018     Priority: Low    Aortic regurgitation [I35.1] 08/05/2018     Priority: Low    CKD (chronic kidney disease), stage III [N18.3] 08/05/2018     Priority: Low    Atelectasis, bilateral [J98.11] 08/05/2018     Priority: Low    Observation for suspected concussion [Z04.8] 08/05/2018     Priority: Low    HB (heart block) [I45.9] 08/05/2018     Priority: Low    Concussion syndrome [F07.81] 08/05/2018     Priority: Low    Fall [W19. XXXA] 08/04/2018     Priority: Low    Seizure disorder ([facility] [address] [L85.747] 03/24/2017     Priority: Low    Mental retardation [F79] 03/24/2017     Priority: Low    Developmental disability [F89] 05/27/2016     Priority: Low    Chronic CHF ([facility] [address] [I50.9] 05/27/2016     Priority: Low        I reviewed and agree with the findings and plan documented in his note . Impression/Plan     1. Telemetry:   4:03 PM yesterday had 7. 96S pause. Since then couple 3 s pauses. BB stopped yesterday. Will observe on Telemetry today. Need repeat Echo.    2. Falls- may be related to above  3. Off BB. Keppra may be contributing some  4. Keep On Telemetry- we will need to watche her 24 more hours to evaluate need for PAcer/ ICD if LV depressed-  We will speak with POA in due time  5.  Uncontrolled HTN add Norvasc and ARB       Electronically signed by Howard Hamlin MD on 8/7/18 at 9:42 AM

## 2018-08-08 ENCOUNTER — APPOINTMENT (OUTPATIENT)
Dept: CARDIAC CATH/INVASIVE PROCEDURES | Age: 83
DRG: 242 | End: 2018-08-08
Payer: COMMERCIAL

## 2018-08-08 ENCOUNTER — APPOINTMENT (OUTPATIENT)
Dept: GENERAL RADIOLOGY | Age: 83
DRG: 242 | End: 2018-08-08
Payer: COMMERCIAL

## 2018-08-08 LAB
ANION GAP SERPL CALCULATED.3IONS-SCNC: 16 MEQ/L (ref 7–13)
BUN BLDV-MCNC: 19 MG/DL (ref 8–23)
CALCIUM SERPL-MCNC: 8.4 MG/DL (ref 8.6–10.2)
CHLORIDE BLD-SCNC: 107 MEQ/L (ref 98–107)
CO2: 19 MEQ/L (ref 22–29)
CREAT SERPL-MCNC: 0.88 MG/DL (ref 0.5–0.9)
GFR AFRICAN AMERICAN: >60
GFR NON-AFRICAN AMERICAN: >60
GLUCOSE BLD-MCNC: 134 MG/DL (ref 74–109)
HCT VFR BLD CALC: 38.8 % (ref 37–47)
HEMOGLOBIN: 13.3 G/DL (ref 12–16)
MCH RBC QN AUTO: 32.3 PG (ref 27–31.3)
MCHC RBC AUTO-ENTMCNC: 34.3 % (ref 33–37)
MCV RBC AUTO: 94.2 FL (ref 82–100)
PDW BLD-RTO: 13.8 % (ref 11.5–14.5)
PLATELET # BLD: 130 K/UL (ref 130–400)
POTASSIUM REFLEX MAGNESIUM: 4.2 MEQ/L (ref 3.5–5.1)
RBC # BLD: 4.12 M/UL (ref 4.2–5.4)
SODIUM BLD-SCNC: 142 MEQ/L (ref 132–144)
WBC # BLD: 4.2 K/UL (ref 4.8–10.8)

## 2018-08-08 PROCEDURE — 02H63JZ INSERTION OF PACEMAKER LEAD INTO RIGHT ATRIUM, PERCUTANEOUS APPROACH: ICD-10-PCS | Performed by: SPECIALIST

## 2018-08-08 PROCEDURE — 2580000003 HC RX 258

## 2018-08-08 PROCEDURE — 33208 INSRT HEART PM ATRIAL & VENT: CPT | Performed by: SPECIALIST

## 2018-08-08 PROCEDURE — 2500000003 HC RX 250 WO HCPCS

## 2018-08-08 PROCEDURE — C1894 INTRO/SHEATH, NON-LASER: HCPCS

## 2018-08-08 PROCEDURE — 0JH636Z INSERTION OF PACEMAKER, DUAL CHAMBER INTO CHEST SUBCUTANEOUS TISSUE AND FASCIA, PERCUTANEOUS APPROACH: ICD-10-PCS | Performed by: SPECIALIST

## 2018-08-08 PROCEDURE — 99291 CRITICAL CARE FIRST HOUR: CPT | Performed by: INTERNAL MEDICINE

## 2018-08-08 PROCEDURE — 6370000000 HC RX 637 (ALT 250 FOR IP): Performed by: INTERNAL MEDICINE

## 2018-08-08 PROCEDURE — 36415 COLL VENOUS BLD VENIPUNCTURE: CPT

## 2018-08-08 PROCEDURE — 97535 SELF CARE MNGMENT TRAINING: CPT

## 2018-08-08 PROCEDURE — 02HK3JZ INSERTION OF PACEMAKER LEAD INTO RIGHT VENTRICLE, PERCUTANEOUS APPROACH: ICD-10-PCS | Performed by: SPECIALIST

## 2018-08-08 PROCEDURE — 6360000002 HC RX W HCPCS: Performed by: SPECIALIST

## 2018-08-08 PROCEDURE — 6360000002 HC RX W HCPCS

## 2018-08-08 PROCEDURE — 2580000003 HC RX 258: Performed by: SPECIALIST

## 2018-08-08 PROCEDURE — 80048 BASIC METABOLIC PNL TOTAL CA: CPT

## 2018-08-08 PROCEDURE — 6370000000 HC RX 637 (ALT 250 FOR IP): Performed by: NURSE PRACTITIONER

## 2018-08-08 PROCEDURE — C1898 LEAD, PMKR, OTHER THAN TRANS: HCPCS

## 2018-08-08 PROCEDURE — 85027 COMPLETE CBC AUTOMATED: CPT

## 2018-08-08 PROCEDURE — 2060000000 HC ICU INTERMEDIATE R&B

## 2018-08-08 PROCEDURE — 97116 GAIT TRAINING THERAPY: CPT

## 2018-08-08 PROCEDURE — C1785 PMKR, DUAL, RATE-RESP: HCPCS

## 2018-08-08 PROCEDURE — 71045 X-RAY EXAM CHEST 1 VIEW: CPT

## 2018-08-08 PROCEDURE — 2580000003 HC RX 258: Performed by: NURSE PRACTITIONER

## 2018-08-08 RX ORDER — AMLODIPINE BESYLATE 5 MG/1
5 TABLET ORAL 2 TIMES DAILY
Status: DISCONTINUED | OUTPATIENT
Start: 2018-08-08 | End: 2018-08-09 | Stop reason: HOSPADM

## 2018-08-08 RX ORDER — VANCOMYCIN HYDROCHLORIDE 1 G/200ML
1000 INJECTION, SOLUTION INTRAVENOUS ONCE
Status: DISCONTINUED | OUTPATIENT
Start: 2018-08-09 | End: 2018-08-09 | Stop reason: HOSPADM

## 2018-08-08 RX ORDER — HYDROCODONE BITARTRATE AND ACETAMINOPHEN 5; 325 MG/1; MG/1
1 TABLET ORAL EVERY 4 HOURS PRN
Status: DISCONTINUED | OUTPATIENT
Start: 2018-08-08 | End: 2018-08-09 | Stop reason: HOSPADM

## 2018-08-08 RX ORDER — SODIUM CHLORIDE 450 MG/100ML
INJECTION, SOLUTION INTRAVENOUS CONTINUOUS
Status: DISCONTINUED | OUTPATIENT
Start: 2018-08-08 | End: 2018-08-09

## 2018-08-08 RX ORDER — SODIUM CHLORIDE 0.9 % (FLUSH) 0.9 %
10 SYRINGE (ML) INJECTION EVERY 12 HOURS SCHEDULED
Status: DISCONTINUED | OUTPATIENT
Start: 2018-08-08 | End: 2018-08-09 | Stop reason: HOSPADM

## 2018-08-08 RX ORDER — HYDROCODONE BITARTRATE AND ACETAMINOPHEN 5; 325 MG/1; MG/1
2 TABLET ORAL EVERY 4 HOURS PRN
Status: DISCONTINUED | OUTPATIENT
Start: 2018-08-08 | End: 2018-08-09 | Stop reason: HOSPADM

## 2018-08-08 RX ORDER — SODIUM CHLORIDE 0.9 % (FLUSH) 0.9 %
10 SYRINGE (ML) INJECTION EVERY 12 HOURS SCHEDULED
Status: CANCELLED | OUTPATIENT
Start: 2018-08-08

## 2018-08-08 RX ORDER — SODIUM CHLORIDE 0.9 % (FLUSH) 0.9 %
10 SYRINGE (ML) INJECTION PRN
Status: DISCONTINUED | OUTPATIENT
Start: 2018-08-08 | End: 2018-08-09 | Stop reason: HOSPADM

## 2018-08-08 RX ORDER — ACETAMINOPHEN 325 MG/1
650 TABLET ORAL EVERY 4 HOURS PRN
Status: DISCONTINUED | OUTPATIENT
Start: 2018-08-08 | End: 2018-08-09 | Stop reason: HOSPADM

## 2018-08-08 RX ORDER — SODIUM CHLORIDE 450 MG/100ML
INJECTION, SOLUTION INTRAVENOUS CONTINUOUS
Status: CANCELLED | OUTPATIENT
Start: 2018-08-08

## 2018-08-08 RX ORDER — ONDANSETRON 2 MG/ML
4 INJECTION INTRAMUSCULAR; INTRAVENOUS EVERY 6 HOURS PRN
Status: DISCONTINUED | OUTPATIENT
Start: 2018-08-08 | End: 2018-08-09 | Stop reason: HOSPADM

## 2018-08-08 RX ORDER — CHLORHEXIDINE GLUCONATE 4 G/100ML
SOLUTION TOPICAL ONCE
Status: CANCELLED | OUTPATIENT
Start: 2018-08-08 | End: 2018-08-08

## 2018-08-08 RX ORDER — SODIUM CHLORIDE 0.9 % (FLUSH) 0.9 %
10 SYRINGE (ML) INJECTION PRN
Status: CANCELLED | OUTPATIENT
Start: 2018-08-08

## 2018-08-08 RX ORDER — SODIUM CHLORIDE 450 MG/100ML
INJECTION, SOLUTION INTRAVENOUS
Status: DISCONTINUED
Start: 2018-08-08 | End: 2018-08-09 | Stop reason: ALTCHOICE

## 2018-08-08 RX ADMIN — ACETAMINOPHEN 650 MG: 325 TABLET ORAL at 22:17

## 2018-08-08 RX ADMIN — SIMVASTATIN 20 MG: 20 TABLET, FILM COATED ORAL at 22:17

## 2018-08-08 RX ADMIN — SODIUM CHLORIDE: 4.5 INJECTION, SOLUTION INTRAVENOUS at 22:26

## 2018-08-08 RX ADMIN — SPIRONOLACTONE 25 MG: 25 TABLET ORAL at 10:06

## 2018-08-08 RX ADMIN — AMLODIPINE BESYLATE 5 MG: 5 TABLET ORAL at 10:06

## 2018-08-08 RX ADMIN — VANCOMYCIN HYDROCHLORIDE 1000 MG: 1 INJECTION, POWDER, LYOPHILIZED, FOR SOLUTION INTRAVENOUS at 16:42

## 2018-08-08 RX ADMIN — AMLODIPINE BESYLATE 5 MG: 5 TABLET ORAL at 22:17

## 2018-08-08 RX ADMIN — LEVETIRACETAM 1000 MG: 500 TABLET ORAL at 10:06

## 2018-08-08 RX ADMIN — MULTIPLE VITAMINS W/ MINERALS TAB 1 TABLET: TAB at 10:06

## 2018-08-08 RX ADMIN — LOSARTAN POTASSIUM 100 MG: 50 TABLET ORAL at 10:06

## 2018-08-08 RX ADMIN — ISOSORBIDE MONONITRATE 30 MG: 30 TABLET, EXTENDED RELEASE ORAL at 10:06

## 2018-08-08 RX ADMIN — Medication 10 ML: at 10:07

## 2018-08-08 RX ADMIN — ACETAMINOPHEN 650 MG: 325 TABLET ORAL at 05:59

## 2018-08-08 RX ADMIN — ASPIRIN 81 MG 81 MG: 81 TABLET ORAL at 22:17

## 2018-08-08 ASSESSMENT — PAIN SCALES - WONG BAKER
WONGBAKER_NUMERICALRESPONSE: 0

## 2018-08-08 ASSESSMENT — PAIN SCALES - GENERAL
PAINLEVEL_OUTOF10: 0

## 2018-08-08 NOTE — PROGRESS NOTES
Hospitalist Progress Note      PCP: No primary care provider on file. Date of Admission: 8/4/2018    Chief Complaint:      Subjective: :  Patient denies any complaints  She had a 11 s pause and cardiology is planning to do permanent pacemaker today    Medications:  Reviewed    Infusion Medications   Scheduled Medications    enoxaparin  40 mg Subcutaneous Daily    amLODIPine  5 mg Oral BID    losartan  100 mg Oral Daily    levETIRAcetam  1,000 mg Oral Daily    isosorbide mononitrate  30 mg Oral Daily    spironolactone  25 mg Oral Daily    aspirin  81 mg Oral Daily    simvastatin  20 mg Oral Nightly    therapeutic multivitamin-minerals  1 tablet Oral Daily    acetaminophen  650 mg Oral 3 times per day    sodium chloride flush  10 mL Intravenous 2 times per day     PRN Meds: sodium chloride flush, magnesium hydroxide, ondansetron, ondansetron      Intake/Output Summary (Last 24 hours) at 08/08/18 0918  Last data filed at 08/07/18 1450   Gross per 24 hour   Intake              120 ml   Output                0 ml   Net              120 ml       Exam:    BP (!) 179/49   Pulse 79   Temp 97.7 °F (36.5 °C) (Oral)   Resp 18   Ht 5' 3\" (1.6 m)   Wt 145 lb 8.1 oz (66 kg)   SpO2 95%   BMI 25.77 kg/m²       General appearance: No apparent distress eating dinner. HEENT: Conjunctivae/corneas clear. Neck: Supple, with full range of motion. Respiratory:  Normal respiratory effort. Clear to auscultation, bilaterally without Rales/Wheezes/Rhonchi. Cardiovascular: Regular rate and rhythm with normal S1/S2 without murmurs, rubs or gallops. Abdomen: Soft, non-tender, non-distended with normal bowel sounds. Musculoskeletal: No clubbing, cyanosis or edema bilaterally. Skin: Skin color, texture, turgor normal.  No rashes or lesions. Neuro: moving all extremities.     Labs:   Recent Labs      08/06/18   0500  08/07/18   0506  08/08/18   0524   WBC  3.9*  3.5*  4.2*   HGB  12.8  12.4  13.3   HCT  37.4  36.1* 38.8   PLT  125*  119*  130     Recent Labs      08/06/18   0500  08/07/18   0506  08/08/18   0524   NA  141  143  142   K  4.3  4.3  4.2   CL  105  110*  107   CO2  21*  21*  19*   BUN  38*  22  19   CREATININE  1.56*  1.01*  0.88   CALCIUM  8.3*  8.1*  8.4*     No results for input(s): AST, ALT, BILIDIR, BILITOT, ALKPHOS in the last 72 hours. No results for input(s): INR in the last 72 hours. No results for input(s): Mora Copas in the last 72 hours. Urinalysis:    Lab Results   Component Value Date    NITRU POSITIVE 08/04/2018    WBCUA  08/04/2018    BACTERIA Many 08/04/2018    RBCUA 0-2 08/04/2018    BLOODU TRACE 08/04/2018    SPECGRAV 1.015 08/04/2018    GLUCOSEU Negative 08/04/2018       Radiology:  XR CHEST PORTABLE   Final Result   MILD CARDIAC ENLARGEMENT WITH MILD CENTRAL VASCULAR CONGESTION. SMALL LEFT-SIDED PLEURAL EFFUSION. SMALL INFILTRATE/ATELECTASIS IN THE LUNG BASES. CT HEAD WO CONTRAST   Final Result      AGE-RELATED FINDINGS, NO ACUTE ABNORMALITY. CEREBRAL ATROPHY UNCHANGED. SOFT TISSUE SWELLING LEFT PARIETAL SCALP. CT OF THE CERVICAL SPINE:      COMPARISONS:  NONE      REASON FOR EXAMINATION:  See above. TECHNIQUE:  Thin axial sections were obtained through the cervical spine. Images were reformatted in the coronal and sagittal projections. FINDINGS: Bones are demineralized. There is good alignment of the spine present. No fracture nor spondylolisthesis is seen. There is narrowing at the C3-4 and C4-5, C5-6 and C6-7 disc levels. The prevertebral soft tissues are unremarkable. Pleural    thickening in apices. Dilated esophagus with air-fluid level. There is a 2 cm nodule within the thyroid isthmus. C3/C4: Narrowed disc with posterior bony spurring. Also anterior spurring present. C4/5:  Narrowed discs with posterior spurring and anterior spurring.  Right posterior lateral bony spurring resulting in some narrowing of the

## 2018-08-08 NOTE — PLAN OF CARE
Problem: Falls - Risk of:  Goal: Will remain free from falls  Will remain free from falls   Outcome: Ongoing    Goal: Absence of physical injury  Absence of physical injury   Outcome: Ongoing      Problem: Risk for Impaired Skin Integrity  Goal: Tissue integrity - skin and mucous membranes  Structural intactness and normal physiological function of skin and  mucous membranes.    Outcome: Ongoing      Problem: Mobility - Impaired:  Goal: Mobility will improve  Mobility will improve   Outcome: Ongoing      Problem: Nutrition  Goal: Optimal nutrition therapy  Outcome: Ongoing

## 2018-08-08 NOTE — PROGRESS NOTES
Patient was unable to sign consents due to mental status. Procedure was considered an emergency and consent was obtained by both Dr. Emeka Valle and [facility].

## 2018-08-08 NOTE — PROGRESS NOTES
of breath) 7/18/2016    Unstable gait 7/19/2016     History reviewed. No pertinent surgical history. Restrictions:  Restrictions/Precautions: Fall Risk  Position Activity Restriction  Other position/activity restrictions: Lifepak and pacer paddles    SUBJECTIVE:  Subjective  Subjective: I have to use the bathroom real bad.    General Comment  Comments: slightly impulsive d/t toileting needs    Pre Pain Assessment:  Pre Treatment Pain Screening  Pain at present: 0          Post Pain Assessment:   Pain Assessment  Pain Level: 0       OBJECTIVE:         Bed mobility  Rolling to Right: Stand by assistance  Supine to Sit: Stand by assistance  Sit to Supine: Stand by assistance  Comment: vc's for technique    Transfers  Sit to Stand: Contact guard assistance  Stand to sit: Contact guard assistance  Bed to Chair: Contact guard assistance (commode)  Comment: Pt pulls up from [employer]; tactile cues for increased safety and hand placement; increased pulsivity d/t toileting needs    Ambulation 1  Surface: level tile  Device: Rolling Walker  Assistance: Contact guard assistance  Quality of Gait: WBOS, reciprocal gait with quick yamilet, distracted but following commands for increased safety   Distance: 20'x2  Comments: distance limited destination and lifepak                                ASSESSMENT:            Activity Tolerance  Activity Tolerance: Patient Tolerated treatment well;Patient limited by cognitive status       Discharge Recommendations:  Continue to assess pending progress, Patient would benefit from continued therapy after discharge    Goals:  Short term goals  Short term goal 1: Pt to demo >fair balance with standing activity   Short term goal 2: Pt to tolerate >5 min standing activity without SOB  Long term goals  Long term goal 1: Pt to demo mod I bed mob  Long term goal 2: Pt to demo supervision transfers  Long term goal 3: Pt to ambulate supervision assist 150 ft with LRAD  Patient Goals   Patient goals : go home    PLAN:   Plan  Times per week: 3-6x/wk  Current Treatment Recommendations: Strengthening, Balance Training, Transfer Training, Neuromuscular Re-education, Home Exercise Program, Patient/Caregiver Education & Training, Modalities, Gait Training, Cognitive Reorientation, Safety Education & Training, Stair training, Endurance Training  Safety Devices  Type of devices:  All fall risk precautions in place, Bed alarm in place, Call light within reach, Left in bed, Telesitter in use     [facility] (6 CLICK) Elvia Barton 28 Inpatient Mobility Raw Score : 18      Therapy Time   Individual   Time In 0900   Time Out 0923   Minutes 23      gait - 10 mins  Bm/Trsf - 13 mins       Truong Calero PTA, 08/08/18 at 9:30 AM

## 2018-08-08 NOTE — PROGRESS NOTES
Progress Note  Patient: Shawn Quispe  Unit/Bed: Z000/S435-23  YOB: 1931  MRN: [id number]  Acct: [de-identified]   Admitting Diagnosis: Fall [W19. XXXA]  Concussion syndrome [F07.81]  Admit Date:  8/4/2018  Hospital Day: 3    Chief Complaint:Profound Bradycardia and pauses    Histories:  Past Medical History:   Diagnosis Date    CHF (congestive heart failure) (HCC)     Chronic CHF ([facility] [address] 5/27/2016    CKD (chronic kidney disease), stage III 8/5/2018    Depression     Developmental disability 5/27/2016    Dizzy spells 7/18/2016    HTN (hypertension) 5/27/2016    Hyperlipidemia     Hypertension     Hypokalemia 5/27/2016    Hypoxia 5/27/2016    Mental retardation 5/27/2016    Mental retardation     Seizures ([facility] [address]     SOB (shortness of breath) 7/18/2016    Unstable gait 7/19/2016     History reviewed. No pertinent surgical history. Family History   Problem Relation Age of Onset    Family history unknown: Yes     Social History     Social History    Marital status: Single     Spouse name: N/A    Number of children: N/A    Years of education: N/A     Social History Main Topics    Smoking status: Never Smoker    Smokeless tobacco: Never Used    Alcohol use No    Drug use: No    Sexual activity: No     Other Topics Concern    None     Social History Narrative    ** Merged History Encounter **            Subjective/HPI  Pt continues to have profound pauses up to 11 seconds. Demonstrated sz like activity during this event. She was in bed. HAd 7 second pause yesterday. Has been off BB 48 hours now. EKG:SR   + above mentioned arrhythmia      Review of Systems:   Review of Systems    No obtainable  Physical Examination:    BP (!) 179/49   Pulse 79   Temp 97.7 °F (36.5 °C) (Oral)   Resp 18   Ht 5' 3\" (1.6 m)   Wt 145 lb 8.1 oz (66 kg)   SpO2 95%   BMI 25.77 kg/m²    Physical Exam   Constitutional: No distress. She appears chronically ill.    HENT:   Normal cephalic and Atraumatic   Eyes: Pupils are equal, round, and reactive to light. Neck: Normal range of motion and thyroid normal. Neck supple. No JVD present. No neck adenopathy. No thyromegaly present. Cardiovascular: Normal rate, regular rhythm, normal heart sounds, intact distal pulses and normal pulses. Pulmonary/Chest: Effort normal and breath sounds normal. She has no wheezes. She has no rales. She exhibits no tenderness. Abdominal: Soft. Bowel sounds are normal. There is no tenderness. Musculoskeletal: Normal range of motion. She exhibits no edema or tenderness. Neurological: She is alert. She exhibits a cognitive deficit. Skin: Skin is warm. No cyanosis. Nails show no clubbing.        LABS:  CBC:   Lab Results   Component Value Date    WBC 4.2 08/08/2018    RBC 4.12 08/08/2018    RBC 4.68 01/26/2012    HGB 13.3 08/08/2018    HCT 38.8 08/08/2018    MCV 94.2 08/08/2018    MCH 32.3 08/08/2018    MCHC 34.3 08/08/2018    RDW 13.8 08/08/2018     08/08/2018    MPV 9.7 03/22/2014     CBC with Differential:    Lab Results   Component Value Date    WBC 4.2 08/08/2018    RBC 4.12 08/08/2018    RBC 4.68 01/26/2012    HGB 13.3 08/08/2018    HCT 38.8 08/08/2018     08/08/2018    MCV 94.2 08/08/2018    MCH 32.3 08/08/2018    MCHC 34.3 08/08/2018    RDW 13.8 08/08/2018    BANDSPCT 34 03/16/2016    METASPCT 2 03/16/2016    LYMPHOPCT 15.6 08/04/2018    MONOPCT 6.3 08/04/2018    EOSPCT 3.0 01/26/2012    BASOPCT 0.7 08/04/2018    MONOSABS 0.3 08/04/2018    LYMPHSABS 0.8 08/04/2018    EOSABS 0.1 08/04/2018    BASOSABS 0.0 08/04/2018     CMP:    Lab Results   Component Value Date     08/08/2018    K 4.2 08/08/2018     08/08/2018    CO2 19 08/08/2018    BUN 19 08/08/2018    CREATININE 0.88 08/08/2018    GFRAA >60.0 08/08/2018    LABGLOM >60.0 08/08/2018    GLUCOSE 134 08/08/2018    GLUCOSE 110 01/26/2012    PROT 7.7 08/04/2018    LABALBU 4.3 08/04/2018    LABALBU 4.2 01/26/2012    CALCIUM 8.4 08/08/2018

## 2018-08-09 ENCOUNTER — APPOINTMENT (OUTPATIENT)
Dept: GENERAL RADIOLOGY | Age: 83
DRG: 242 | End: 2018-08-09
Payer: COMMERCIAL

## 2018-08-09 VITALS
SYSTOLIC BLOOD PRESSURE: 223 MMHG | HEIGHT: 63 IN | BODY MASS INDEX: 25.78 KG/M2 | OXYGEN SATURATION: 94 % | DIASTOLIC BLOOD PRESSURE: 53 MMHG | RESPIRATION RATE: 18 BRPM | WEIGHT: 145.5 LBS | HEART RATE: 73 BPM | TEMPERATURE: 96 F

## 2018-08-09 LAB
ANION GAP SERPL CALCULATED.3IONS-SCNC: 11 MEQ/L (ref 7–13)
BLOOD CULTURE, ROUTINE: NORMAL
BUN BLDV-MCNC: 18 MG/DL (ref 8–23)
CALCIUM SERPL-MCNC: 8.4 MG/DL (ref 8.6–10.2)
CHLORIDE BLD-SCNC: 108 MEQ/L (ref 98–107)
CO2: 22 MEQ/L (ref 22–29)
CREAT SERPL-MCNC: 0.91 MG/DL (ref 0.5–0.9)
CULTURE, BLOOD 2: NORMAL
GFR AFRICAN AMERICAN: >60
GFR NON-AFRICAN AMERICAN: 58.4
GLUCOSE BLD-MCNC: 91 MG/DL (ref 74–109)
HCT VFR BLD CALC: 40.5 % (ref 37–47)
HEMOGLOBIN: 14 G/DL (ref 12–16)
MCH RBC QN AUTO: 32.6 PG (ref 27–31.3)
MCHC RBC AUTO-ENTMCNC: 34.6 % (ref 33–37)
MCV RBC AUTO: 94.3 FL (ref 82–100)
PDW BLD-RTO: 13.4 % (ref 11.5–14.5)
PLATELET # BLD: 122 K/UL (ref 130–400)
POTASSIUM REFLEX MAGNESIUM: 4.2 MEQ/L (ref 3.5–5.1)
RBC # BLD: 4.3 M/UL (ref 4.2–5.4)
SODIUM BLD-SCNC: 141 MEQ/L (ref 132–144)
WBC # BLD: 4.8 K/UL (ref 4.8–10.8)

## 2018-08-09 PROCEDURE — 99232 SBSQ HOSP IP/OBS MODERATE 35: CPT | Performed by: INTERNAL MEDICINE

## 2018-08-09 PROCEDURE — 80048 BASIC METABOLIC PNL TOTAL CA: CPT

## 2018-08-09 PROCEDURE — 6370000000 HC RX 637 (ALT 250 FOR IP): Performed by: INTERNAL MEDICINE

## 2018-08-09 PROCEDURE — 71046 X-RAY EXAM CHEST 2 VIEWS: CPT

## 2018-08-09 PROCEDURE — 97116 GAIT TRAINING THERAPY: CPT

## 2018-08-09 PROCEDURE — 36415 COLL VENOUS BLD VENIPUNCTURE: CPT

## 2018-08-09 PROCEDURE — 6360000002 HC RX W HCPCS: Performed by: INTERNAL MEDICINE

## 2018-08-09 PROCEDURE — 6370000000 HC RX 637 (ALT 250 FOR IP): Performed by: NURSE PRACTITIONER

## 2018-08-09 PROCEDURE — 85027 COMPLETE CBC AUTOMATED: CPT

## 2018-08-09 RX ORDER — CARVEDILOL 12.5 MG/1
12.5 TABLET ORAL 2 TIMES DAILY
Status: DISCONTINUED | OUTPATIENT
Start: 2018-08-09 | End: 2018-08-09 | Stop reason: HOSPADM

## 2018-08-09 RX ADMIN — ASPIRIN 81 MG 81 MG: 81 TABLET ORAL at 08:31

## 2018-08-09 RX ADMIN — ACETAMINOPHEN 650 MG: 325 TABLET ORAL at 06:43

## 2018-08-09 RX ADMIN — AMLODIPINE BESYLATE 5 MG: 5 TABLET ORAL at 08:31

## 2018-08-09 RX ADMIN — MULTIPLE VITAMINS W/ MINERALS TAB 1 TABLET: TAB at 08:31

## 2018-08-09 RX ADMIN — SPIRONOLACTONE 25 MG: 25 TABLET ORAL at 08:31

## 2018-08-09 RX ADMIN — LOSARTAN POTASSIUM 100 MG: 50 TABLET ORAL at 08:31

## 2018-08-09 RX ADMIN — ISOSORBIDE MONONITRATE 30 MG: 30 TABLET, EXTENDED RELEASE ORAL at 08:31

## 2018-08-09 RX ADMIN — LEVETIRACETAM 1000 MG: 500 TABLET ORAL at 08:31

## 2018-08-09 RX ADMIN — ACETAMINOPHEN 650 MG: 325 TABLET ORAL at 15:41

## 2018-08-09 RX ADMIN — ENOXAPARIN SODIUM 40 MG: 40 INJECTION SUBCUTANEOUS at 08:31

## 2018-08-09 RX ADMIN — CARVEDILOL 12.5 MG: 12.5 TABLET, FILM COATED ORAL at 10:13

## 2018-08-09 ASSESSMENT — PAIN SCALES - GENERAL
PAINLEVEL_OUTOF10: 0
PAINLEVEL_OUTOF10: 0

## 2018-08-09 NOTE — CARE COORDINATION
LSW talked to [facility] with the adult family home. [facility] is agreeable to [facility] and freedom of choice was discussed. [facility] would like [facility]. Petra states pt has Dr. Shade Dean with [facility]. Petra wants to know if the RN passed on in report that she would like Dr. Abdoul Dasilva for the pt. LSW updated Wade Lawrence RN that took over for Bob Rose RN. Petra would like the LSW to set the discharge up for before 7:30 pm. RN and C3 updated. Electronically signed by SERGE Mayer on 8/9/18 at 12:19 PM    Per the RN, the  is scheduling the pt's out pt appointments and the discharge time needs to be changed. LSW called [facility] and changed the discharge time to 4 pm. RN is calling Alf Bolanos at the Adult home and will also give her report.  Electronically signed by SERGE Mayer on 8/9/18 at 2:01 PM

## 2018-08-09 NOTE — OP NOTE
[address]                       [address], [address] [facility]                                 OPERATIVE REPORT    PATIENT NAME: Elizabeth Garcia                 :        1931  MED REC NO:   [id number]                            ROOM:       H071  ACCOUNT NO:   [de-identified]                           ADMIT DATE: 2018  PROVIDER:     Reed Lemons MD    DATE OF PROCEDURE:  2018    PREOPERATIVE DIAGNOSIS:  Significant SA gurmeet dysfunction with pauses of  more than 10 seconds and bouts of complete AV block. POSTOPERATIVE DIAGNOSIS:  Significant SA gurmeet dysfunction with pauses of  more than 10 seconds and bouts of complete AV block. Mental retardation. OPERATION PERFORMED:  Permanent pacemaker implant, DDD. SURGEON:  Reed Lemons MD    OPERATIVE PROCEDURE:  The patient was brought to the EP lab in the  post-absorptive state. The vitals were stable. Informed consent was  obtained. The left hemithorax was prepared and draped in the usual manner. The left infraclavicular fossa was infiltrated with 2% Xylocaine. An  incision was made two fingerbreadths below the left clavicle and deepened  down to the pectoralis fascia. A pocket was made by dissection. Hemostasis was secured. Using the Seldinger technique, the left subclavian  vein was captured two separate times. Two separate guidewires were  advanced under direct fluoroscopy to the junction of the superior vena cava  and the right atrium. Two #7-[place]izen [place] dilators and inducers were advanced  over one of the guidewires at a time. The dilators and the guidewires were  removed. A ventricular lead by [employer], model number [id number], serial  number [id number] was advanced under direct fluoroscopy to RV septal  position. R-wave of 11.7 mV with slew rate of 2.3 V per second, impendence  955 ohms. At 0.4 msec, voltage threshold was 1.3 V and the current of 2.0  mA. A 10 V testing was done. No diaphragmatic pacing was documented. The  lead was secured to the pectoralis fascia muscle using two separate 0 silk  sutures. Same technique was used to manipulate an atrial lead by [employer], model  number [id number] and serial number of [id number]. The lead was advanced  under direct fluoroscopy to the right atrial appendix. P-wave of 2.2 mV  with a slew rate of 0.2 V per second. Impedance 698 ohms. At 0.4 msec,  the voltage threshold was 2 V and the current of 8.9 mA. A 10 V testing  was done. No diaphragmatic pacing was documented. The lead was secured to  the pectoralis fascia muscle using two separate 0 silk sutures. The pocket  was irrigated with copious amount of antibiotics. The generator by  [employer] model number [id number], serial number of [id number] was hooked up to  the leads. Proper sensing and pacing were documented. The hemostasis was  evident. The pocket was irrigated with copious amount of antibiotic. The  subcutaneous tissue was closed using 3-0 Vicryl in two separate layers. The subcuticular tissue was closed using 4-0 Vicryl. The wound was  bandaged in the usual fashion. The patient tolerated the procedure well and was discharged from the EP lab  in a stable condition.         Simin Ross MD    D: 08/08/2018 18:29:34       T: 08/09/2018 2:40:51     [email or web address]  Job#: [id number]     Doc#: [id number]    CC:  MD Michelle Urbina MD Shelvia Lips, MD

## 2018-08-09 NOTE — PROGRESS NOTES
Normal rate, regular rhythm, normal heart sounds, intact distal pulses and normal pulses. Pulmonary/Chest: Effort normal and breath sounds normal. She has no wheezes. She has no rales. She exhibits no tenderness. Abdominal: Soft. Bowel sounds are normal. There is no tenderness. Musculoskeletal: Normal range of motion. She exhibits no edema or tenderness. Neurological: She is alert and oriented to person, place, and time. Skin: Skin is warm. No cyanosis. Nails show no clubbing.    Left Pect Pacer site stable  LABS:  CBC:   Lab Results   Component Value Date    WBC 4.8 08/09/2018    RBC 4.30 08/09/2018    RBC 4.68 01/26/2012    HGB 14.0 08/09/2018    HCT 40.5 08/09/2018    MCV 94.3 08/09/2018    MCH 32.6 08/09/2018    MCHC 34.6 08/09/2018    RDW 13.4 08/09/2018     08/09/2018    MPV 9.7 03/22/2014     CBC with Differential:    Lab Results   Component Value Date    WBC 4.8 08/09/2018    RBC 4.30 08/09/2018    RBC 4.68 01/26/2012    HGB 14.0 08/09/2018    HCT 40.5 08/09/2018     08/09/2018    MCV 94.3 08/09/2018    MCH 32.6 08/09/2018    MCHC 34.6 08/09/2018    RDW 13.4 08/09/2018    BANDSPCT 34 03/16/2016    METASPCT 2 03/16/2016    LYMPHOPCT 15.6 08/04/2018    MONOPCT 6.3 08/04/2018    EOSPCT 3.0 01/26/2012    BASOPCT 0.7 08/04/2018    MONOSABS 0.3 08/04/2018    LYMPHSABS 0.8 08/04/2018    EOSABS 0.1 08/04/2018    BASOSABS 0.0 08/04/2018     CMP:    Lab Results   Component Value Date     08/09/2018    K 4.2 08/09/2018     08/09/2018    CO2 22 08/09/2018    BUN 18 08/09/2018    CREATININE 0.91 08/09/2018    GFRAA >60.0 08/09/2018    LABGLOM 58.4 08/09/2018    GLUCOSE 91 08/09/2018    GLUCOSE 110 01/26/2012    PROT 7.7 08/04/2018    LABALBU 4.3 08/04/2018    LABALBU 4.2 01/26/2012    CALCIUM 8.4 08/09/2018    BILITOT 0.3 08/04/2018    ALKPHOS 141 08/04/2018    AST 17 08/04/2018    ALT 19 08/04/2018     BMP:    Lab Results   Component Value Date     08/09/2018    K 4.2 08/09/2018

## 2018-08-09 NOTE — PROGRESS NOTES
Nutrition Assessment    Type and Reason for Visit: Reassess    Nutrition Recommendations: Continue current diet, Discontinue ONS    Malnutrition Assessment:  · Malnutrition Status: No malnutrition  · Context: Chronic illness  · Findings of the 6 clinical characteristics of malnutrition (Minimum of 2 out of 6 clinical characteristics is required to make the diagnosis of moderate or severe Protein Calorie Malnutrition based on AND/ASPEN Guidelines):  1. Energy Intake-Not available,      2. Weight Loss-No significant weight loss,    3. Fat Loss-No significant subcutaneous fat loss,    4. Muscle Loss-No significant muscle mass loss,    5. Fluid Accumulation-No significant fluid accumulation,    6.  Strength-Not measured    Nutrition Diagnosis:   · Problem: Inadequate oral intake  (improving)  · Etiology: related to Cognitive or neurological impairment (current condition)     Signs and symptoms:  as evidenced by  (decreased intake at admission, now improved)    Nutrition Assessment:  · Subjective Assessment: Pt stated that she eats good when questioned. Intake noted good per nsg. · Nutrition-Focused Physical Findings: No edema noted.   · Wound Type: None  · Current Nutrition Therapies:  · Oral Diet Orders: General   · Oral Diet intake: %  · Oral Nutrition Supplement (ONS) Orders: Standard High Calorie Oral Supplement (tid)  · ONS intake: Unable to assess  · Anthropometric Measures:  · Ht: 5' 3\" (160 cm)   · Current Body Wt: 145 lb (65.8 kg) (8-8 actual)  · Admission Body Wt: 150 lb (68 kg) (estimated)  · Usual Body Wt: 135 lb (61.2 kg) (03/2017 actual)  · % Weight Change: Wt gain noted,     · Ideal Body Wt: 115 lb (52.2 kg), % Ideal Body 126%  · BMI Classification: BMI 25.0 - 29.9 Overweight  · Comparative Standards (Estimated Nutrition Needs):  · Estimated Daily Total Kcal: 7290-3371  · Estimated Daily Protein (g): 52-62    Estimated Intake vs Estimated Needs: Intake Improving    Nutrition Risk Level:

## 2018-08-09 NOTE — BH NOTE
Discussed with Dr Betty Strickland and care coordination. Pt received pacemaker/ no emergency at present. Pt is being discharged today back to adult home. Recommend for pt's  outpt PCP/ psychiatric provider be notified of request for guardianship and have them complete needed paperwork.

## 2018-08-09 NOTE — PROGRESS NOTES
Patient resting in bed, no current complaints   Dressing on left chest is dry and intact   Safety maintained       1400-  Called report to patient caregiver Silvestre Ma called and updated   Read through discharge instructions and instructed caregiver to call on follow up appts  Safety maintained     1645-  [facility] patient [facility]   Called caregiver   Safety maintained

## 2018-08-09 NOTE — CARE COORDINATION
Per REGAN LUGO - she spoke with Samanta Kc at the board of [facility] yesterday regarding the need for guardianship for the pt. Per REGAN LUGO, the pt was unable to make a decision about a pace maker and 2 physicians documented the need for the pace maker. REGAN LUGO also received a call from [facility] supervisor [facility] asking for a statement from the LSW as to the reason the pt needs a guardian. LSW left a message for KATERINA Hill, regarding the consult for guardianship. Per RN, pt is from an adult foster home per the care giver, Ruth Ann Jordan. Osman Mendoza states the pt's insurance was wanting [employer] (insurance suggested [address]) for the pt. C3 updated. Electronically signed by SERGE Cox on 8/9/18 at 10:48 AM    Per Norma De Jesus NP for [facility], Dr. Katherin West is aware of the consult and see the pt today. Electronically signed by SERGE Cox on 8/9/18 at 11:07 AM    KATERINA Hill, called the LSW back and states Dr. Katherin West is recommending the pt follow up out pt for the guardianship. REGAN LUGO to fax psych's [facility] to [facility] for the recommendation for out pt guardianship. KATERINA Hill, requests the consult be cancelled. C3 and RN updated.  Electronically signed by SERGE Cox on 8/9/18 at 11:41 AM

## 2018-08-09 NOTE — FLOWSHEET NOTE
Pts sling caused more of a disturbance than a help last night and pt was very fidgetty with it. After removing it, pt became much more calm and was not moving as much. Pt doesn't use her left arm much and doesn't move it up higher than to her mouth.  Electronically signed by Toni Spring RN on 8/9/2018 at 6:48 AM

## 2018-08-16 NOTE — DISCHARGE SUMMARY
aspirin 81 MG tablet Take 81 mg by mouth daily      simvastatin (ZOCOR) 20 MG tablet Take 20 mg by mouth nightly      sertraline (ZOLOFT) 50 MG tablet Take 50 mg by mouth daily      potassium chloride (KLOR-CON M) 10 MEQ extended release tablet Take 1 tablet by mouth daily (Patient taking differently: Take 10 mEq by mouth daily ) 90 tablet 3    isosorbide mononitrate (IMDUR) 30 MG extended release tablet Take 1 tablet by mouth daily 90 tablet 3    spironolactone (ALDACTONE) 25 MG tablet Take 1 tablet by mouth daily 90 tablet 3    potassium chloride SA (K-DUR;KLOR-CON M) 10 MEQ tablet Take 10 mEq by mouth daily           Discharge Exam:  HEENT: AT/NC, PERRLA, no JVD  HEART: s1/s2 wnl w/o s3  LUNG: clear  ABD: soft, NT  EXT: no edema  SKin : no rash  Neuro:no focal deficits      Disposition: home    Patient Instructions: Activity: as tolerated  Diet:cardiac diet    Follow-up with PCP in 1 week  Overtime on d/c summary was 40 min     Medication List      CHANGE how you take these medications    aspirin 81 MG tablet  What changed:  Another medication with the same name was removed. Continue taking this medication, and follow the directions you see here. carvedilol 12.5 MG tablet  Commonly known as:  COREG  Take 1 tablet by mouth 2 times daily (with meals)  What changed:  Another medication with the same name was removed. Continue taking this medication, and follow the directions you see here. furosemide 40 MG tablet  Commonly known as:  LASIX  Take 1 tablet by mouth daily  What changed:  Another medication with the same name was removed. Continue taking this medication, and follow the directions you see here. isosorbide mononitrate 30 MG extended release tablet  Commonly known as:  IMDUR  Take 1 tablet by mouth daily  What changed:  Another medication with the same name was removed. Continue taking this medication, and follow the directions you see here.      levETIRAcetam 500 MG tablet  Commonly known as:  KEPPRA  Take 1 tablet by mouth 2 times daily  What changed:  Another medication with the same name was removed. Continue taking this medication, and follow the directions you see here. sertraline 50 MG tablet  Commonly known as:  ZOLOFT  What changed:  Another medication with the same name was removed. Continue taking this medication, and follow the directions you see here. simvastatin 20 MG tablet  Commonly known as:  ZOCOR  What changed:  Another medication with the same name was removed. Continue taking this medication, and follow the directions you see here. spironolactone 25 MG tablet  Commonly known as:  ALDACTONE  Take 1 tablet by mouth daily  What changed:  Another medication with the same name was removed. Continue taking this medication, and follow the directions you see here. CONTINUE taking these medications    amLODIPine 5 MG tablet  Commonly known as:  NORVASC  Take 2 tablets by mouth daily     FYCOMPA 2 MG Tabs  Generic drug:  Perampanel     vitamin D 2000 units Caps capsule        STOP taking these medications    amLODIPine-benazepril 5-10 MG per capsule  Commonly known as:  LOTREL        ASK your doctor about these medications    DILANTIN 100 MG ER capsule  Generic drug:  phenytoin     * potassium chloride 10 MEQ extended release tablet  Commonly known as:  KLOR-CON M     * potassium chloride 10 MEQ extended release tablet  Commonly known as:  KLOR-CON M  Take 1 tablet by mouth daily        * This list has 2 medication(s) that are the same as other medications prescribed for you. Read the directions carefully, and ask your doctor or other care provider to review them with you.                   Arti Perez  8/15/2018  11:09 PM

## 2018-08-25 ENCOUNTER — HOSPITAL ENCOUNTER (OUTPATIENT)
Dept: ULTRASOUND IMAGING | Age: 83
Discharge: HOME | End: 2018-08-27
Payer: COMMERCIAL

## 2018-08-25 DIAGNOSIS — E04.1 THYROID NODULE: ICD-10-CM

## 2018-08-25 PROCEDURE — 76536 US EXAM OF HEAD AND NECK: CPT
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
## 2019-01-11 ENCOUNTER — APPOINTMENT (OUTPATIENT)
Dept: CT IMAGING | Age: 84
End: 2019-01-11
Payer: COMMERCIAL

## 2019-01-11 ENCOUNTER — HOSPITAL ENCOUNTER (EMERGENCY)
Age: 84
Discharge: HOME | End: 2019-01-11
Attending: EMERGENCY MEDICINE
Payer: COMMERCIAL

## 2019-01-11 VITALS
HEART RATE: 72 BPM | BODY MASS INDEX: 23.92 KG/M2 | TEMPERATURE: 97.6 F | OXYGEN SATURATION: 99 % | WEIGHT: 135 LBS | HEIGHT: 63 IN | DIASTOLIC BLOOD PRESSURE: 59 MMHG | RESPIRATION RATE: 16 BRPM | SYSTOLIC BLOOD PRESSURE: 138 MMHG

## 2019-01-11 DIAGNOSIS — J18.9 PNEUMONIA DUE TO ORGANISM: Primary | ICD-10-CM

## 2019-01-11 DIAGNOSIS — N30.00 ACUTE CYSTITIS WITHOUT HEMATURIA: ICD-10-CM

## 2019-01-11 LAB
ALBUMIN SERPL-MCNC: 3.7 G/DL (ref 3.9–4.9)
ALP BLD-CCNC: 120 U/L (ref 40–130)
ALT SERPL-CCNC: 16 U/L (ref 0–33)
ANION GAP SERPL CALCULATED.3IONS-SCNC: 12 MEQ/L (ref 7–13)
AST SERPL-CCNC: 24 U/L (ref 0–35)
BACTERIA: ABNORMAL /HPF
BASOPHILS ABSOLUTE: 0 K/UL (ref 0–0.2)
BASOPHILS RELATIVE PERCENT: 0.7 %
BILIRUB SERPL-MCNC: 0.3 MG/DL (ref 0–1.2)
BILIRUBIN URINE: NEGATIVE
BLOOD, URINE: NEGATIVE
BUN BLDV-MCNC: 27 MG/DL (ref 8–23)
CALCIUM SERPL-MCNC: 9.1 MG/DL (ref 8.6–10.2)
CHLORIDE BLD-SCNC: 104 MEQ/L (ref 98–107)
CLARITY: CLEAR
CO2: 24 MEQ/L (ref 22–29)
COLOR: YELLOW
CREAT SERPL-MCNC: 1.34 MG/DL (ref 0.5–0.9)
EOSINOPHILS ABSOLUTE: 0.1 K/UL (ref 0–0.7)
EOSINOPHILS RELATIVE PERCENT: 1.8 %
EPITHELIAL CELLS, UA: ABNORMAL /HPF (ref 0–5)
GFR AFRICAN AMERICAN: 45.2
GFR NON-AFRICAN AMERICAN: 37.4
GLOBULIN: 3.6 G/DL (ref 2.3–3.5)
GLUCOSE BLD-MCNC: 109 MG/DL (ref 74–109)
GLUCOSE URINE: NEGATIVE MG/DL
HCT VFR BLD CALC: 38.1 % (ref 37–47)
HEMOGLOBIN: 13.4 G/DL (ref 12–16)
HYALINE CASTS: ABNORMAL /HPF (ref 0–5)
KETONES, URINE: NEGATIVE MG/DL
LEUKOCYTE ESTERASE, URINE: ABNORMAL
LIPASE: 33 U/L (ref 13–60)
LYMPHOCYTES ABSOLUTE: 1 K/UL (ref 1–4.8)
LYMPHOCYTES RELATIVE PERCENT: 20.6 %
MCH RBC QN AUTO: 32.5 PG (ref 27–31.3)
MCHC RBC AUTO-ENTMCNC: 35.1 % (ref 33–37)
MCV RBC AUTO: 92.5 FL (ref 82–100)
MONOCYTES ABSOLUTE: 0.5 K/UL (ref 0.2–0.8)
MONOCYTES RELATIVE PERCENT: 11.1 %
NEUTROPHILS ABSOLUTE: 3.2 K/UL (ref 1.4–6.5)
NEUTROPHILS RELATIVE PERCENT: 65.8 %
NITRITE, URINE: POSITIVE
PDW BLD-RTO: 13.6 % (ref 11.5–14.5)
PH UA: 5.5 (ref 5–9)
PHENYTOIN LEVEL: 10.9 UG/ML (ref 10–20)
PLATELET # BLD: 129 K/UL (ref 130–400)
POC CREATININE WHOLE BLOOD: 1.4
POTASSIUM SERPL-SCNC: 5 MEQ/L (ref 3.5–5.1)
PROTEIN UA: NEGATIVE MG/DL
RBC # BLD: 4.12 M/UL (ref 4.2–5.4)
RBC UA: ABNORMAL /HPF (ref 0–5)
SODIUM BLD-SCNC: 140 MEQ/L (ref 132–144)
SPECIFIC GRAVITY UA: 1.02 (ref 1–1.03)
TOTAL PROTEIN: 7.3 G/DL (ref 6.4–8.1)
UROBILINOGEN, URINE: 0.2 E.U./DL
WBC # BLD: 4.9 K/UL (ref 4.8–10.8)
WBC UA: ABNORMAL /HPF (ref 0–5)

## 2019-01-11 PROCEDURE — 80185 ASSAY OF PHENYTOIN TOTAL: CPT

## 2019-01-11 PROCEDURE — 96365 THER/PROPH/DIAG IV INF INIT: CPT

## 2019-01-11 PROCEDURE — 6360000002 HC RX W HCPCS: Performed by: EMERGENCY MEDICINE

## 2019-01-11 PROCEDURE — 36415 COLL VENOUS BLD VENIPUNCTURE: CPT

## 2019-01-11 PROCEDURE — 85025 COMPLETE CBC W/AUTO DIFF WBC: CPT

## 2019-01-11 PROCEDURE — 6360000004 HC RX CONTRAST MEDICATION: Performed by: EMERGENCY MEDICINE

## 2019-01-11 PROCEDURE — 96366 THER/PROPH/DIAG IV INF ADDON: CPT

## 2019-01-11 PROCEDURE — 2580000003 HC RX 258: Performed by: EMERGENCY MEDICINE

## 2019-01-11 PROCEDURE — 74177 CT ABD & PELVIS W/CONTRAST: CPT

## 2019-01-11 PROCEDURE — 96375 TX/PRO/DX INJ NEW DRUG ADDON: CPT

## 2019-01-11 PROCEDURE — 81001 URINALYSIS AUTO W/SCOPE: CPT

## 2019-01-11 PROCEDURE — 99284 EMERGENCY DEPT VISIT MOD MDM: CPT

## 2019-01-11 PROCEDURE — 80053 COMPREHEN METABOLIC PANEL: CPT

## 2019-01-11 PROCEDURE — 87040 BLOOD CULTURE FOR BACTERIA: CPT

## 2019-01-11 PROCEDURE — 83690 ASSAY OF LIPASE: CPT

## 2019-01-11 RX ORDER — 0.9 % SODIUM CHLORIDE 0.9 %
1000 INTRAVENOUS SOLUTION INTRAVENOUS ONCE
Status: COMPLETED | OUTPATIENT
Start: 2019-01-11 | End: 2019-01-11

## 2019-01-11 RX ORDER — LEVOFLOXACIN 500 MG/1
500 TABLET, FILM COATED ORAL DAILY
Qty: 7 TABLET | Refills: 0 | Status: SHIPPED | OUTPATIENT
Start: 2019-01-11 | End: 2019-01-18

## 2019-01-11 RX ORDER — SODIUM CHLORIDE 0.9 % (FLUSH) 0.9 %
10 SYRINGE (ML) INJECTION ONCE
Status: COMPLETED | OUTPATIENT
Start: 2019-01-11 | End: 2019-01-11

## 2019-01-11 RX ORDER — ATORVASTATIN CALCIUM 10 MG/1
10 TABLET, FILM COATED ORAL DAILY
Status: ON HOLD | COMMUNITY
End: 2020-01-01 | Stop reason: HOSPADM

## 2019-01-11 RX ORDER — LEVOFLOXACIN 5 MG/ML
750 INJECTION, SOLUTION INTRAVENOUS ONCE
Status: COMPLETED | OUTPATIENT
Start: 2019-01-11 | End: 2019-01-11

## 2019-01-11 RX ORDER — METOCLOPRAMIDE HYDROCHLORIDE 5 MG/ML
10 INJECTION INTRAMUSCULAR; INTRAVENOUS ONCE
Status: COMPLETED | OUTPATIENT
Start: 2019-01-11 | End: 2019-01-11

## 2019-01-11 RX ORDER — DIPHENHYDRAMINE HYDROCHLORIDE 50 MG/ML
25 INJECTION INTRAMUSCULAR; INTRAVENOUS ONCE
Status: COMPLETED | OUTPATIENT
Start: 2019-01-11 | End: 2019-01-11

## 2019-01-11 RX ADMIN — SODIUM CHLORIDE 1000 ML: 9 INJECTION, SOLUTION INTRAVENOUS at 11:18

## 2019-01-11 RX ADMIN — DIPHENHYDRAMINE HYDROCHLORIDE 25 MG: 50 INJECTION, SOLUTION INTRAMUSCULAR; INTRAVENOUS at 11:20

## 2019-01-11 RX ADMIN — IOPAMIDOL 100 ML: 612 INJECTION, SOLUTION INTRAVENOUS at 12:31

## 2019-01-11 RX ADMIN — Medication 10 ML: at 12:31

## 2019-01-11 RX ADMIN — LEVOFLOXACIN 750 MG: 5 INJECTION, SOLUTION INTRAVENOUS at 14:00

## 2019-01-11 RX ADMIN — METOCLOPRAMIDE 10 MG: 5 INJECTION, SOLUTION INTRAMUSCULAR; INTRAVENOUS at 11:18

## 2019-01-11 ASSESSMENT — PAIN DESCRIPTION - LOCATION: LOCATION: ABDOMEN

## 2019-01-13 LAB
GFR AFRICAN AMERICAN: 43
GFR NON-AFRICAN AMERICAN: 36
PERFORMED ON: ABNORMAL
POC CREATININE: 1.4 MG/DL (ref 0.6–1.2)
POC SAMPLE TYPE: ABNORMAL

## 2019-01-16 LAB
BLOOD CULTURE, ROUTINE: NORMAL
CULTURE, BLOOD 2: NORMAL

## 2019-09-30 DIAGNOSIS — G40.909 SEIZURE DISORDER (HCC): Primary | Chronic | ICD-10-CM

## 2019-10-21 ENCOUNTER — OFFICE VISIT (OUTPATIENT)
Dept: NEUROLOGY | Age: 84
End: 2019-10-21
Payer: MEDICARE

## 2019-10-21 VITALS
HEIGHT: 62 IN | HEART RATE: 64 BPM | BODY MASS INDEX: 23 KG/M2 | DIASTOLIC BLOOD PRESSURE: 52 MMHG | SYSTOLIC BLOOD PRESSURE: 136 MMHG | WEIGHT: 125 LBS

## 2019-10-21 DIAGNOSIS — F79 INTELLECTUAL DISABILITY: Chronic | ICD-10-CM

## 2019-10-21 DIAGNOSIS — R48.2 GAIT APRAXIA: ICD-10-CM

## 2019-10-21 DIAGNOSIS — G40.909 SEIZURE DISORDER (HCC): Primary | Chronic | ICD-10-CM

## 2019-10-21 PROCEDURE — 99214 OFFICE O/P EST MOD 30 MIN: CPT | Performed by: PSYCHIATRY & NEUROLOGY

## 2019-10-21 RX ORDER — POTASSIUM CHLORIDE 750 MG/1
TABLET, FILM COATED, EXTENDED RELEASE ORAL
Refills: 1 | COMMUNITY
Start: 2019-09-29

## 2019-10-21 RX ORDER — AMLODIPINE BESYLATE 10 MG/1
TABLET ORAL
Refills: 1 | COMMUNITY
Start: 2019-09-22

## 2019-10-21 ASSESSMENT — ENCOUNTER SYMPTOMS
NAUSEA: 0
PHOTOPHOBIA: 0
SHORTNESS OF BREATH: 0
VOMITING: 0
TROUBLE SWALLOWING: 0
CHOKING: 0
BACK PAIN: 0

## 2019-10-31 ENCOUNTER — APPOINTMENT (OUTPATIENT)
Dept: CT IMAGING | Age: 84
DRG: 689 | End: 2019-10-31
Payer: MEDICARE

## 2019-10-31 ENCOUNTER — HOSPITAL ENCOUNTER (INPATIENT)
Age: 84
LOS: 2 days | Discharge: HOME | DRG: 689 | End: 2019-11-02
Attending: EMERGENCY MEDICINE | Admitting: INTERNAL MEDICINE
Payer: MEDICARE

## 2019-10-31 DIAGNOSIS — E86.0 DEHYDRATION: ICD-10-CM

## 2019-10-31 DIAGNOSIS — N30.00 ACUTE CYSTITIS WITHOUT HEMATURIA: ICD-10-CM

## 2019-10-31 DIAGNOSIS — R40.0 SOMNOLENCE: Primary | ICD-10-CM

## 2019-10-31 PROBLEM — A41.9 SEPSIS (HCC): Status: ACTIVE | Noted: 2019-10-31

## 2019-10-31 LAB
ALBUMIN SERPL-MCNC: 3.6 G/DL (ref 3.5–4.6)
ALP BLD-CCNC: 157 U/L (ref 40–130)
ALT SERPL-CCNC: 18 U/L (ref 0–33)
ANION GAP SERPL CALCULATED.3IONS-SCNC: 12 MEQ/L (ref 9–15)
AST SERPL-CCNC: 14 U/L (ref 0–35)
BACTERIA: ABNORMAL /HPF
BASOPHILS ABSOLUTE: 0.1 K/UL (ref 0–0.2)
BASOPHILS RELATIVE PERCENT: 3 %
BILIRUB SERPL-MCNC: <0.2 MG/DL (ref 0.2–0.7)
BILIRUBIN URINE: NEGATIVE
BLOOD, URINE: ABNORMAL
BUN BLDV-MCNC: 42 MG/DL (ref 8–23)
CALCIUM SERPL-MCNC: 8.5 MG/DL (ref 8.5–9.9)
CHLORIDE BLD-SCNC: 106 MEQ/L (ref 95–107)
CLARITY: ABNORMAL
CO2: 23 MEQ/L (ref 20–31)
COLOR: YELLOW
CREAT SERPL-MCNC: 1.38 MG/DL (ref 0.5–0.9)
EKG ATRIAL RATE: 61 BPM
EKG P-R INTERVAL: 280 MS
EKG Q-T INTERVAL: 460 MS
EKG QRS DURATION: 94 MS
EKG QTC CALCULATION (BAZETT): 463 MS
EKG R AXIS: 0 DEGREES
EKG T AXIS: 62 DEGREES
EKG VENTRICULAR RATE: 61 BPM
EOSINOPHILS ABSOLUTE: 0.1 K/UL (ref 0–0.7)
EOSINOPHILS RELATIVE PERCENT: 2 %
EPITHELIAL CELLS, UA: ABNORMAL /HPF (ref 0–5)
GFR AFRICAN AMERICAN: 43.6
GFR NON-AFRICAN AMERICAN: 36
GLOBULIN: 3.2 G/DL (ref 2.3–3.5)
GLUCOSE BLD-MCNC: 108 MG/DL (ref 70–99)
GLUCOSE URINE: NEGATIVE MG/DL
HCT VFR BLD CALC: 34.4 % (ref 37–47)
HEMOGLOBIN: 11.6 G/DL (ref 12–16)
HYALINE CASTS: ABNORMAL /HPF (ref 0–5)
INR BLD: 1.2
KETONES, URINE: NEGATIVE MG/DL
LACTIC ACID: 0.8 MMOL/L (ref 0.5–2.2)
LEUKOCYTE ESTERASE, URINE: ABNORMAL
LYMPHOCYTES ABSOLUTE: 0.8 K/UL (ref 1–4.8)
LYMPHOCYTES RELATIVE PERCENT: 27 %
MAGNESIUM: 2.2 MG/DL (ref 1.7–2.4)
MCH RBC QN AUTO: 31.3 PG (ref 27–31.3)
MCHC RBC AUTO-ENTMCNC: 33.7 % (ref 33–37)
MCV RBC AUTO: 92.9 FL (ref 82–100)
MONOCYTES ABSOLUTE: 0.2 K/UL (ref 0.2–0.8)
MONOCYTES RELATIVE PERCENT: 4.7 %
NEUTROPHILS ABSOLUTE: 1.9 K/UL (ref 1.4–6.5)
NEUTROPHILS RELATIVE PERCENT: 64 %
NITRITE, URINE: NEGATIVE
PDW BLD-RTO: 15.5 % (ref 11.5–14.5)
PH UA: 5 (ref 5–9)
PHENYTOIN LEVEL: 17.8 UG/ML (ref 10–20)
PLATELET # BLD: 126 K/UL (ref 130–400)
PLATELET SLIDE REVIEW: ABNORMAL
POTASSIUM SERPL-SCNC: 4.6 MEQ/L (ref 3.4–4.9)
PROTEIN UA: NEGATIVE MG/DL
PROTHROMBIN TIME: 15.3 SEC (ref 12.3–14.9)
RBC # BLD: 3.71 M/UL (ref 4.2–5.4)
RBC UA: ABNORMAL /HPF (ref 0–5)
SLIDE REVIEW: ABNORMAL
SODIUM BLD-SCNC: 141 MEQ/L (ref 135–144)
SPECIFIC GRAVITY UA: 1.01 (ref 1–1.03)
T3 FREE: 2.5 PG/ML (ref 2–4.4)
T4 FREE: 0.94 NG/DL (ref 0.84–1.68)
TOTAL PROTEIN: 6.8 G/DL (ref 6.3–8)
TSH SERPL DL<=0.05 MIU/L-ACNC: 6.14 UIU/ML (ref 0.44–3.86)
URINE REFLEX TO CULTURE: YES
UROBILINOGEN, URINE: 0.2 E.U./DL
WBC # BLD: 3 K/UL (ref 4.8–10.8)
WBC UA: ABNORMAL /HPF (ref 0–5)

## 2019-10-31 PROCEDURE — 87077 CULTURE AEROBIC IDENTIFY: CPT

## 2019-10-31 PROCEDURE — 84481 FREE ASSAY (FT-3): CPT

## 2019-10-31 PROCEDURE — 84443 ASSAY THYROID STIM HORMONE: CPT

## 2019-10-31 PROCEDURE — 70450 CT HEAD/BRAIN W/O DYE: CPT

## 2019-10-31 PROCEDURE — 84439 ASSAY OF FREE THYROXINE: CPT

## 2019-10-31 PROCEDURE — 93005 ELECTROCARDIOGRAM TRACING: CPT | Performed by: NURSE PRACTITIONER

## 2019-10-31 PROCEDURE — 6360000002 HC RX W HCPCS: Performed by: NURSE PRACTITIONER

## 2019-10-31 PROCEDURE — 83735 ASSAY OF MAGNESIUM: CPT

## 2019-10-31 PROCEDURE — 99284 EMERGENCY DEPT VISIT MOD MDM: CPT

## 2019-10-31 PROCEDURE — 2580000003 HC RX 258: Performed by: NURSE PRACTITIONER

## 2019-10-31 PROCEDURE — 6360000002 HC RX W HCPCS: Performed by: EMERGENCY MEDICINE

## 2019-10-31 PROCEDURE — 80053 COMPREHEN METABOLIC PANEL: CPT

## 2019-10-31 PROCEDURE — 2580000003 HC RX 258: Performed by: EMERGENCY MEDICINE

## 2019-10-31 PROCEDURE — 85610 PROTHROMBIN TIME: CPT

## 2019-10-31 PROCEDURE — 81001 URINALYSIS AUTO W/SCOPE: CPT

## 2019-10-31 PROCEDURE — 87186 SC STD MICRODIL/AGAR DIL: CPT

## 2019-10-31 PROCEDURE — 96365 THER/PROPH/DIAG IV INF INIT: CPT

## 2019-10-31 PROCEDURE — 80185 ASSAY OF PHENYTOIN TOTAL: CPT

## 2019-10-31 PROCEDURE — 87040 BLOOD CULTURE FOR BACTERIA: CPT

## 2019-10-31 PROCEDURE — 99222 1ST HOSP IP/OBS MODERATE 55: CPT | Performed by: PSYCHIATRY & NEUROLOGY

## 2019-10-31 PROCEDURE — 80177 DRUG SCRN QUAN LEVETIRACETAM: CPT

## 2019-10-31 PROCEDURE — 87086 URINE CULTURE/COLONY COUNT: CPT

## 2019-10-31 PROCEDURE — 1210000000 HC MED SURG R&B

## 2019-10-31 PROCEDURE — 36415 COLL VENOUS BLD VENIPUNCTURE: CPT

## 2019-10-31 PROCEDURE — 83605 ASSAY OF LACTIC ACID: CPT

## 2019-10-31 PROCEDURE — 6370000000 HC RX 637 (ALT 250 FOR IP): Performed by: INTERNAL MEDICINE

## 2019-10-31 PROCEDURE — 85025 COMPLETE CBC W/AUTO DIFF WBC: CPT

## 2019-10-31 RX ORDER — ASPIRIN 81 MG/1
81 TABLET, CHEWABLE ORAL DAILY
Status: DISCONTINUED | OUTPATIENT
Start: 2019-10-31 | End: 2019-11-02 | Stop reason: HOSPADM

## 2019-10-31 RX ORDER — SODIUM CHLORIDE 0.9 % (FLUSH) 0.9 %
10 SYRINGE (ML) INJECTION EVERY 12 HOURS SCHEDULED
Status: DISCONTINUED | OUTPATIENT
Start: 2019-10-31 | End: 2019-11-02 | Stop reason: HOSPADM

## 2019-10-31 RX ORDER — ACETAMINOPHEN 325 MG/1
650 TABLET ORAL EVERY 4 HOURS PRN
Status: DISCONTINUED | OUTPATIENT
Start: 2019-10-31 | End: 2019-11-02 | Stop reason: HOSPADM

## 2019-10-31 RX ORDER — 0.9 % SODIUM CHLORIDE 0.9 %
1000 INTRAVENOUS SOLUTION INTRAVENOUS ONCE
Status: COMPLETED | OUTPATIENT
Start: 2019-10-31 | End: 2019-10-31

## 2019-10-31 RX ORDER — SODIUM CHLORIDE 0.9 % (FLUSH) 0.9 %
10 SYRINGE (ML) INJECTION PRN
Status: DISCONTINUED | OUTPATIENT
Start: 2019-10-31 | End: 2019-11-02 | Stop reason: HOSPADM

## 2019-10-31 RX ORDER — CARVEDILOL 12.5 MG/1
12.5 TABLET ORAL 2 TIMES DAILY WITH MEALS
Status: DISCONTINUED | OUTPATIENT
Start: 2019-10-31 | End: 2019-11-02 | Stop reason: HOSPADM

## 2019-10-31 RX ORDER — ATORVASTATIN CALCIUM 10 MG/1
10 TABLET, FILM COATED ORAL DAILY
Status: DISCONTINUED | OUTPATIENT
Start: 2019-10-31 | End: 2019-11-02 | Stop reason: HOSPADM

## 2019-10-31 RX ORDER — ISOSORBIDE MONONITRATE 30 MG/1
30 TABLET, EXTENDED RELEASE ORAL DAILY
Status: DISCONTINUED | OUTPATIENT
Start: 2019-10-31 | End: 2019-11-02 | Stop reason: HOSPADM

## 2019-10-31 RX ORDER — AMLODIPINE BESYLATE 5 MG/1
5 TABLET ORAL DAILY
Status: DISCONTINUED | OUTPATIENT
Start: 2019-10-31 | End: 2019-11-02 | Stop reason: HOSPADM

## 2019-10-31 RX ORDER — ONDANSETRON 2 MG/ML
4 INJECTION INTRAMUSCULAR; INTRAVENOUS EVERY 6 HOURS PRN
Status: DISCONTINUED | OUTPATIENT
Start: 2019-10-31 | End: 2019-11-02 | Stop reason: HOSPADM

## 2019-10-31 RX ORDER — LEVETIRACETAM 500 MG/1
500 TABLET ORAL 2 TIMES DAILY
Status: DISCONTINUED | OUTPATIENT
Start: 2019-10-31 | End: 2019-11-02 | Stop reason: HOSPADM

## 2019-10-31 RX ORDER — HEPARIN SODIUM 5000 [USP'U]/ML
5000 INJECTION, SOLUTION INTRAVENOUS; SUBCUTANEOUS 2 TIMES DAILY
Status: DISCONTINUED | OUTPATIENT
Start: 2019-10-31 | End: 2019-11-02 | Stop reason: HOSPADM

## 2019-10-31 RX ADMIN — CEFTRIAXONE SODIUM 1 G: 1 INJECTION, POWDER, FOR SOLUTION INTRAMUSCULAR; INTRAVENOUS at 12:13

## 2019-10-31 RX ADMIN — LEVETIRACETAM 500 MG: 500 TABLET ORAL at 23:17

## 2019-10-31 RX ADMIN — SODIUM CHLORIDE 1000 ML: 9 INJECTION, SOLUTION INTRAVENOUS at 12:13

## 2019-10-31 RX ADMIN — Medication 10 ML: at 23:18

## 2019-10-31 RX ADMIN — CARVEDILOL 12.5 MG: 12.5 TABLET, FILM COATED ORAL at 18:35

## 2019-10-31 RX ADMIN — HEPARIN SODIUM 5000 UNITS: 5000 INJECTION INTRAVENOUS; SUBCUTANEOUS at 18:35

## 2019-10-31 RX ADMIN — ATORVASTATIN CALCIUM 10 MG: 10 TABLET, FILM COATED ORAL at 23:18

## 2019-10-31 ASSESSMENT — ENCOUNTER SYMPTOMS
SHORTNESS OF BREATH: 0
SORE THROAT: 0
NAUSEA: 0
VOMITING: 0
EYE PAIN: 0
ABDOMINAL PAIN: 0
CHEST TIGHTNESS: 0

## 2019-11-01 LAB
ANION GAP SERPL CALCULATED.3IONS-SCNC: 13 MEQ/L (ref 9–15)
BUN BLDV-MCNC: 41 MG/DL (ref 8–23)
CALCIUM SERPL-MCNC: 8.4 MG/DL (ref 8.5–9.9)
CHLORIDE BLD-SCNC: 110 MEQ/L (ref 95–107)
CO2: 19 MEQ/L (ref 20–31)
CREAT SERPL-MCNC: 1.39 MG/DL (ref 0.5–0.9)
GFR AFRICAN AMERICAN: 43.2
GFR NON-AFRICAN AMERICAN: 35.7
GLUCOSE BLD-MCNC: 78 MG/DL (ref 70–99)
HCT VFR BLD CALC: 36.4 % (ref 37–47)
HEMOGLOBIN: 11.9 G/DL (ref 12–16)
MCH RBC QN AUTO: 31.3 PG (ref 27–31.3)
MCHC RBC AUTO-ENTMCNC: 32.7 % (ref 33–37)
MCV RBC AUTO: 95.8 FL (ref 82–100)
PDW BLD-RTO: 15.3 % (ref 11.5–14.5)
PLATELET # BLD: 124 K/UL (ref 130–400)
POTASSIUM REFLEX MAGNESIUM: 4.5 MEQ/L (ref 3.4–4.9)
RBC # BLD: 3.8 M/UL (ref 4.2–5.4)
SODIUM BLD-SCNC: 142 MEQ/L (ref 135–144)
WBC # BLD: 3.1 K/UL (ref 4.8–10.8)

## 2019-11-01 PROCEDURE — 99233 SBSQ HOSP IP/OBS HIGH 50: CPT | Performed by: PSYCHIATRY & NEUROLOGY

## 2019-11-01 PROCEDURE — 85027 COMPLETE CBC AUTOMATED: CPT

## 2019-11-01 PROCEDURE — 80048 BASIC METABOLIC PNL TOTAL CA: CPT

## 2019-11-01 PROCEDURE — 36415 COLL VENOUS BLD VENIPUNCTURE: CPT

## 2019-11-01 PROCEDURE — 6370000000 HC RX 637 (ALT 250 FOR IP): Performed by: INTERNAL MEDICINE

## 2019-11-01 PROCEDURE — 6370000000 HC RX 637 (ALT 250 FOR IP): Performed by: NURSE PRACTITIONER

## 2019-11-01 PROCEDURE — 1210000000 HC MED SURG R&B

## 2019-11-01 PROCEDURE — 6360000002 HC RX W HCPCS: Performed by: NURSE PRACTITIONER

## 2019-11-01 PROCEDURE — 2580000003 HC RX 258: Performed by: NURSE PRACTITIONER

## 2019-11-01 RX ORDER — PHENYTOIN SODIUM 100 MG/1
100 CAPSULE, EXTENDED RELEASE ORAL 2 TIMES DAILY
Status: DISCONTINUED | OUTPATIENT
Start: 2019-11-01 | End: 2019-11-02 | Stop reason: HOSPADM

## 2019-11-01 RX ADMIN — CARVEDILOL 12.5 MG: 12.5 TABLET, FILM COATED ORAL at 09:52

## 2019-11-01 RX ADMIN — Medication 10 ML: at 12:26

## 2019-11-01 RX ADMIN — LEVETIRACETAM 500 MG: 500 TABLET ORAL at 09:52

## 2019-11-01 RX ADMIN — CARVEDILOL 12.5 MG: 12.5 TABLET, FILM COATED ORAL at 17:35

## 2019-11-01 RX ADMIN — PHENYTOIN SODIUM 100 MG: 100 CAPSULE ORAL at 12:20

## 2019-11-01 RX ADMIN — PHENYTOIN SODIUM 100 MG: 100 CAPSULE ORAL at 21:34

## 2019-11-01 RX ADMIN — AMLODIPINE BESYLATE 5 MG: 5 TABLET ORAL at 09:53

## 2019-11-01 RX ADMIN — LEVETIRACETAM 500 MG: 500 TABLET ORAL at 21:34

## 2019-11-01 RX ADMIN — ISOSORBIDE MONONITRATE 30 MG: 30 TABLET, EXTENDED RELEASE ORAL at 09:53

## 2019-11-01 RX ADMIN — ATORVASTATIN CALCIUM 10 MG: 10 TABLET, FILM COATED ORAL at 21:34

## 2019-11-01 RX ADMIN — CEFTRIAXONE SODIUM 1 G: 1 INJECTION, POWDER, FOR SOLUTION INTRAMUSCULAR; INTRAVENOUS at 12:22

## 2019-11-01 RX ADMIN — ASPIRIN 81 MG 81 MG: 81 TABLET ORAL at 09:53

## 2019-11-01 RX ADMIN — Medication 10 ML: at 21:35

## 2019-11-01 RX ADMIN — HEPARIN SODIUM 5000 UNITS: 5000 INJECTION INTRAVENOUS; SUBCUTANEOUS at 06:35

## 2019-11-01 RX ADMIN — HEPARIN SODIUM 5000 UNITS: 5000 INJECTION INTRAVENOUS; SUBCUTANEOUS at 17:33

## 2019-11-01 RX ADMIN — SERTRALINE HYDROCHLORIDE 50 MG: 50 TABLET ORAL at 09:53

## 2019-11-01 ASSESSMENT — ENCOUNTER SYMPTOMS
WHEEZING: 0
SHORTNESS OF BREATH: 0
VOMITING: 0
COUGH: 0
COLOR CHANGE: 0
TROUBLE SWALLOWING: 0
ABDOMINAL PAIN: 0

## 2019-11-01 ASSESSMENT — PAIN SCALES - GENERAL: PAINLEVEL_OUTOF10: 0

## 2019-11-02 VITALS
OXYGEN SATURATION: 97 % | DIASTOLIC BLOOD PRESSURE: 61 MMHG | RESPIRATION RATE: 15 BRPM | HEIGHT: 60 IN | WEIGHT: 126.76 LBS | SYSTOLIC BLOOD PRESSURE: 151 MMHG | BODY MASS INDEX: 24.89 KG/M2 | HEART RATE: 75 BPM | TEMPERATURE: 97.5 F

## 2019-11-02 LAB
ANION GAP SERPL CALCULATED.3IONS-SCNC: 14 MEQ/L (ref 9–15)
BUN BLDV-MCNC: 40 MG/DL (ref 8–23)
CALCIUM SERPL-MCNC: 8.4 MG/DL (ref 8.5–9.9)
CHLORIDE BLD-SCNC: 109 MEQ/L (ref 95–107)
CO2: 20 MEQ/L (ref 20–31)
CREAT SERPL-MCNC: 1.22 MG/DL (ref 0.5–0.9)
GFR AFRICAN AMERICAN: 50.3
GFR NON-AFRICAN AMERICAN: 41.5
GLUCOSE BLD-MCNC: 83 MG/DL (ref 70–99)
HCT VFR BLD CALC: 33.6 % (ref 37–47)
HEMOGLOBIN: 11.3 G/DL (ref 12–16)
KEPPRA: 42 UG/ML (ref 12–46)
MCH RBC QN AUTO: 31.2 PG (ref 27–31.3)
MCHC RBC AUTO-ENTMCNC: 33.6 % (ref 33–37)
MCV RBC AUTO: 93 FL (ref 82–100)
ORGANISM: ABNORMAL
PDW BLD-RTO: 15.2 % (ref 11.5–14.5)
PLATELET # BLD: 122 K/UL (ref 130–400)
POTASSIUM REFLEX MAGNESIUM: 4.4 MEQ/L (ref 3.4–4.9)
RBC # BLD: 3.62 M/UL (ref 4.2–5.4)
SODIUM BLD-SCNC: 143 MEQ/L (ref 135–144)
URINE CULTURE, ROUTINE: ABNORMAL
WBC # BLD: 3 K/UL (ref 4.8–10.8)

## 2019-11-02 PROCEDURE — 6370000000 HC RX 637 (ALT 250 FOR IP): Performed by: NURSE PRACTITIONER

## 2019-11-02 PROCEDURE — 2580000003 HC RX 258: Performed by: NURSE PRACTITIONER

## 2019-11-02 PROCEDURE — 6360000002 HC RX W HCPCS: Performed by: NURSE PRACTITIONER

## 2019-11-02 PROCEDURE — 80048 BASIC METABOLIC PNL TOTAL CA: CPT

## 2019-11-02 PROCEDURE — 6370000000 HC RX 637 (ALT 250 FOR IP): Performed by: INTERNAL MEDICINE

## 2019-11-02 PROCEDURE — 36415 COLL VENOUS BLD VENIPUNCTURE: CPT

## 2019-11-02 PROCEDURE — 99233 SBSQ HOSP IP/OBS HIGH 50: CPT | Performed by: PSYCHIATRY & NEUROLOGY

## 2019-11-02 PROCEDURE — 85027 COMPLETE CBC AUTOMATED: CPT

## 2019-11-02 RX ORDER — CIPROFLOXACIN 500 MG/1
500 TABLET, FILM COATED ORAL 2 TIMES DAILY
Qty: 4 TABLET | Refills: 0 | Status: CANCELLED | OUTPATIENT
Start: 2019-11-03 | End: 2019-11-05

## 2019-11-02 RX ORDER — CEPHALEXIN 250 MG/1
250 CAPSULE ORAL 2 TIMES DAILY
Qty: 6 CAPSULE | Refills: 0 | Status: SHIPPED | OUTPATIENT
Start: 2019-11-02 | End: 2019-11-05

## 2019-11-02 RX ADMIN — ASPIRIN 81 MG 81 MG: 81 TABLET ORAL at 09:42

## 2019-11-02 RX ADMIN — LEVETIRACETAM 500 MG: 500 TABLET ORAL at 09:42

## 2019-11-02 RX ADMIN — PHENYTOIN SODIUM 100 MG: 100 CAPSULE ORAL at 09:42

## 2019-11-02 RX ADMIN — CARVEDILOL 12.5 MG: 12.5 TABLET, FILM COATED ORAL at 09:46

## 2019-11-02 RX ADMIN — AMLODIPINE BESYLATE 5 MG: 5 TABLET ORAL at 09:42

## 2019-11-02 RX ADMIN — Medication 10 ML: at 09:45

## 2019-11-02 RX ADMIN — SERTRALINE HYDROCHLORIDE 50 MG: 50 TABLET ORAL at 09:43

## 2019-11-02 RX ADMIN — CEFTRIAXONE SODIUM 1 G: 1 INJECTION, POWDER, FOR SOLUTION INTRAMUSCULAR; INTRAVENOUS at 12:29

## 2019-11-02 RX ADMIN — ISOSORBIDE MONONITRATE 30 MG: 30 TABLET, EXTENDED RELEASE ORAL at 09:42

## 2019-11-02 RX ADMIN — HEPARIN SODIUM 5000 UNITS: 5000 INJECTION INTRAVENOUS; SUBCUTANEOUS at 05:59

## 2019-11-02 ASSESSMENT — PAIN SCALES - GENERAL: PAINLEVEL_OUTOF10: 0

## 2019-11-05 LAB
BLOOD CULTURE, ROUTINE: NORMAL
CULTURE, BLOOD 2: NORMAL

## 2020-01-01 ENCOUNTER — TELEPHONE (OUTPATIENT)
Dept: OTHER | Facility: CLINIC | Age: 85
End: 2020-01-01

## 2020-01-01 ENCOUNTER — OFFICE VISIT (OUTPATIENT)
Dept: PULMONOLOGY | Age: 85
End: 2020-01-01
Payer: MEDICARE

## 2020-01-01 ENCOUNTER — APPOINTMENT (OUTPATIENT)
Dept: GENERAL RADIOLOGY | Age: 85
DRG: 177 | End: 2020-01-01
Payer: MEDICARE

## 2020-01-01 ENCOUNTER — APPOINTMENT (OUTPATIENT)
Dept: GENERAL RADIOLOGY | Age: 85
End: 2020-01-01
Payer: MEDICARE

## 2020-01-01 ENCOUNTER — HOSPITAL ENCOUNTER (INPATIENT)
Age: 85
LOS: 5 days | Discharge: HOME HEALTH | DRG: 177 | End: 2020-02-19
Attending: INTERNAL MEDICINE | Admitting: INTERNAL MEDICINE
Payer: MEDICARE

## 2020-01-01 ENCOUNTER — APPOINTMENT (OUTPATIENT)
Dept: CT IMAGING | Age: 85
DRG: 177 | End: 2020-01-01
Payer: MEDICARE

## 2020-01-01 ENCOUNTER — CARE COORDINATION (OUTPATIENT)
Dept: CARE COORDINATION | Age: 85
End: 2020-01-01

## 2020-01-01 ENCOUNTER — APPOINTMENT (OUTPATIENT)
Dept: CT IMAGING | Age: 85
End: 2020-01-01
Payer: MEDICARE

## 2020-01-01 ENCOUNTER — HOSPITAL ENCOUNTER (EMERGENCY)
Age: 85
Discharge: HOME | End: 2020-07-06
Attending: EMERGENCY MEDICINE
Payer: MEDICARE

## 2020-01-01 ENCOUNTER — APPOINTMENT (OUTPATIENT)
Dept: ULTRASOUND IMAGING | Age: 85
DRG: 177 | End: 2020-01-01
Payer: MEDICARE

## 2020-01-01 ENCOUNTER — HOSPITAL ENCOUNTER (INPATIENT)
Age: 85
LOS: 4 days | Discharge: HOME | DRG: 177 | End: 2020-02-25
Attending: INTERNAL MEDICINE | Admitting: INTERNAL MEDICINE
Payer: MEDICARE

## 2020-01-01 ENCOUNTER — HOSPITAL ENCOUNTER (EMERGENCY)
Age: 85
End: 2020-12-03
Attending: EMERGENCY MEDICINE
Payer: MEDICARE

## 2020-01-01 VITALS
OXYGEN SATURATION: 96 % | TEMPERATURE: 97 F | HEIGHT: 63 IN | SYSTOLIC BLOOD PRESSURE: 166 MMHG | BODY MASS INDEX: 25.69 KG/M2 | WEIGHT: 145 LBS | HEART RATE: 79 BPM | RESPIRATION RATE: 16 BRPM | DIASTOLIC BLOOD PRESSURE: 66 MMHG

## 2020-01-01 VITALS
OXYGEN SATURATION: 100 % | WEIGHT: 135 LBS | TEMPERATURE: 97.3 F | BODY MASS INDEX: 24.84 KG/M2 | SYSTOLIC BLOOD PRESSURE: 55 MMHG | DIASTOLIC BLOOD PRESSURE: 31 MMHG | HEART RATE: 60 BPM | HEIGHT: 62 IN | RESPIRATION RATE: 6 BRPM

## 2020-01-01 VITALS
DIASTOLIC BLOOD PRESSURE: 64 MMHG | RESPIRATION RATE: 16 BRPM | HEART RATE: 73 BPM | TEMPERATURE: 96.4 F | OXYGEN SATURATION: 95 % | SYSTOLIC BLOOD PRESSURE: 138 MMHG

## 2020-01-01 VITALS
HEIGHT: 60 IN | OXYGEN SATURATION: 99 % | HEART RATE: 74 BPM | BODY MASS INDEX: 28.47 KG/M2 | WEIGHT: 145 LBS | TEMPERATURE: 98.8 F | DIASTOLIC BLOOD PRESSURE: 70 MMHG | RESPIRATION RATE: 18 BRPM | SYSTOLIC BLOOD PRESSURE: 118 MMHG

## 2020-01-01 VITALS
HEART RATE: 66 BPM | OXYGEN SATURATION: 90 % | RESPIRATION RATE: 19 BRPM | SYSTOLIC BLOOD PRESSURE: 159 MMHG | HEIGHT: 63 IN | WEIGHT: 145 LBS | DIASTOLIC BLOOD PRESSURE: 51 MMHG | BODY MASS INDEX: 25.69 KG/M2 | TEMPERATURE: 97.8 F

## 2020-01-01 LAB
ALBUMIN SERPL-MCNC: 2.7 G/DL (ref 3.5–4.6)
ALBUMIN SERPL-MCNC: 2.9 G/DL (ref 3.5–4.6)
ALBUMIN SERPL-MCNC: 3.7 G/DL (ref 3.5–4.6)
ALBUMIN SERPL-MCNC: 4 G/DL (ref 3.5–4.6)
ALP BLD-CCNC: 123 U/L (ref 40–130)
ALP BLD-CCNC: 157 U/L (ref 40–130)
ALP BLD-CCNC: 94 U/L (ref 40–130)
ALP BLD-CCNC: 98 U/L (ref 40–130)
ALT SERPL-CCNC: 20 U/L (ref 0–33)
ALT SERPL-CCNC: 21 U/L (ref 0–33)
ALT SERPL-CCNC: 27 U/L (ref 0–33)
ALT SERPL-CCNC: 8 U/L (ref 0–33)
AMPHETAMINE SCREEN, URINE: NORMAL
ANION GAP SERPL CALCULATED.3IONS-SCNC: 11 MEQ/L (ref 9–15)
ANION GAP SERPL CALCULATED.3IONS-SCNC: 12 MEQ/L (ref 9–15)
ANION GAP SERPL CALCULATED.3IONS-SCNC: 12 MEQ/L (ref 9–15)
ANION GAP SERPL CALCULATED.3IONS-SCNC: 13 MEQ/L (ref 9–15)
ANION GAP SERPL CALCULATED.3IONS-SCNC: 13 MEQ/L (ref 9–15)
ANION GAP SERPL CALCULATED.3IONS-SCNC: 14 MEQ/L (ref 9–15)
ANION GAP SERPL CALCULATED.3IONS-SCNC: 15 MEQ/L (ref 9–15)
ANION GAP SERPL CALCULATED.3IONS-SCNC: 15 MEQ/L (ref 9–15)
ANION GAP SERPL CALCULATED.3IONS-SCNC: 16 MEQ/L (ref 9–15)
ANION GAP SERPL CALCULATED.3IONS-SCNC: 16 MEQ/L (ref 9–15)
ANION GAP SERPL CALCULATED.3IONS-SCNC: 9 MEQ/L (ref 9–15)
ANISOCYTOSIS: ABNORMAL
APPEARANCE FLUID: CLEAR
AST SERPL-CCNC: 12 U/L (ref 0–35)
AST SERPL-CCNC: 19 U/L (ref 0–35)
AST SERPL-CCNC: 22 U/L (ref 0–35)
AST SERPL-CCNC: 27 U/L (ref 0–35)
BACTERIA: ABNORMAL /HPF
BACTERIA: NEGATIVE /HPF
BARBITURATE SCREEN URINE: NORMAL
BASE EXCESS ARTERIAL: -2 (ref -3–3)
BASE EXCESS ARTERIAL: -3 (ref -3–3)
BASOPHILS ABSOLUTE: 0 K/UL (ref 0–0.2)
BASOPHILS RELATIVE PERCENT: 0.1 %
BASOPHILS RELATIVE PERCENT: 0.2 %
BASOPHILS RELATIVE PERCENT: 0.3 %
BASOPHILS RELATIVE PERCENT: 0.3 %
BASOPHILS RELATIVE PERCENT: 0.4 %
BASOPHILS RELATIVE PERCENT: 0.5 %
BASOPHILS RELATIVE PERCENT: 0.5 %
BASOPHILS RELATIVE PERCENT: 0.6 %
BASOPHILS RELATIVE PERCENT: 1 %
BENZODIAZEPINE SCREEN, URINE: NORMAL
BILIRUB SERPL-MCNC: 0.3 MG/DL (ref 0.2–0.7)
BILIRUB SERPL-MCNC: 0.3 MG/DL (ref 0.2–0.7)
BILIRUB SERPL-MCNC: 0.7 MG/DL (ref 0.2–0.7)
BILIRUB SERPL-MCNC: <0.2 MG/DL (ref 0.2–0.7)
BILIRUBIN URINE: NEGATIVE
BLOOD CULTURE, ROUTINE: NORMAL
BLOOD, URINE: ABNORMAL
BLOOD, URINE: NEGATIVE
BODY FLUID CULTURE, STERILE: NORMAL
BUN BLDV-MCNC: 18 MG/DL (ref 8–23)
BUN BLDV-MCNC: 21 MG/DL (ref 8–23)
BUN BLDV-MCNC: 22 MG/DL (ref 8–23)
BUN BLDV-MCNC: 24 MG/DL (ref 8–23)
BUN BLDV-MCNC: 30 MG/DL (ref 8–23)
BUN BLDV-MCNC: 32 MG/DL (ref 8–23)
BUN BLDV-MCNC: 32 MG/DL (ref 8–23)
BUN BLDV-MCNC: 34 MG/DL (ref 8–23)
BUN BLDV-MCNC: 35 MG/DL (ref 8–23)
BUN BLDV-MCNC: 36 MG/DL (ref 8–23)
BUN BLDV-MCNC: 39 MG/DL (ref 8–23)
BUN BLDV-MCNC: 42 MG/DL (ref 8–23)
BUN BLDV-MCNC: 42 MG/DL (ref 8–23)
C-REACTIVE PROTEIN, HIGH SENSITIVITY: 204 MG/L (ref 0–5)
CALCIUM IONIZED: 1.22 MMOL/L (ref 1.12–1.32)
CALCIUM IONIZED: 1.25 MMOL/L (ref 1.12–1.32)
CALCIUM SERPL-MCNC: 10 MG/DL (ref 8.5–9.9)
CALCIUM SERPL-MCNC: 7.9 MG/DL (ref 8.5–9.9)
CALCIUM SERPL-MCNC: 8 MG/DL (ref 8.5–9.9)
CALCIUM SERPL-MCNC: 8.3 MG/DL (ref 8.5–9.9)
CALCIUM SERPL-MCNC: 8.4 MG/DL (ref 8.5–9.9)
CALCIUM SERPL-MCNC: 8.5 MG/DL (ref 8.5–9.9)
CALCIUM SERPL-MCNC: 8.5 MG/DL (ref 8.5–9.9)
CALCIUM SERPL-MCNC: 8.6 MG/DL (ref 8.5–9.9)
CALCIUM SERPL-MCNC: 8.6 MG/DL (ref 8.5–9.9)
CALCIUM SERPL-MCNC: 8.7 MG/DL (ref 8.5–9.9)
CALCIUM SERPL-MCNC: 8.8 MG/DL (ref 8.5–9.9)
CANNABINOID SCREEN URINE: NORMAL
CASTS 2: NORMAL /LPF
CASTS: ABNORMAL /LPF
CASTS: NORMAL /LPF
CELL COUNT FLUID TYPE: NORMAL
CHLORIDE BLD-SCNC: 104 MEQ/L (ref 95–107)
CHLORIDE BLD-SCNC: 108 MEQ/L (ref 95–107)
CHLORIDE BLD-SCNC: 110 MEQ/L (ref 95–107)
CHLORIDE BLD-SCNC: 110 MEQ/L (ref 95–107)
CHLORIDE BLD-SCNC: 112 MEQ/L (ref 95–107)
CHLORIDE BLD-SCNC: 112 MEQ/L (ref 95–107)
CHLORIDE BLD-SCNC: 113 MEQ/L (ref 95–107)
CHLORIDE BLD-SCNC: 114 MEQ/L (ref 95–107)
CLARITY: ABNORMAL
CLARITY: CLEAR
CLOT EVALUATION: NORMAL
CO2: 16 MEQ/L (ref 20–31)
CO2: 17 MEQ/L (ref 20–31)
CO2: 17 MEQ/L (ref 20–31)
CO2: 18 MEQ/L (ref 20–31)
CO2: 19 MEQ/L (ref 20–31)
CO2: 19 MEQ/L (ref 20–31)
CO2: 20 MEQ/L (ref 20–31)
CO2: 22 MEQ/L (ref 20–31)
CO2: 22 MEQ/L (ref 20–31)
CO2: 23 MEQ/L (ref 20–31)
CO2: 23 MEQ/L (ref 20–31)
COCAINE METABOLITE SCREEN URINE: NORMAL
COLOR FLUID: NORMAL
COLOR: YELLOW
CREAT SERPL-MCNC: 1.02 MG/DL (ref 0.5–0.9)
CREAT SERPL-MCNC: 1.02 MG/DL (ref 0.5–0.9)
CREAT SERPL-MCNC: 1.03 MG/DL (ref 0.5–0.9)
CREAT SERPL-MCNC: 1.05 MG/DL (ref 0.5–0.9)
CREAT SERPL-MCNC: 1.05 MG/DL (ref 0.5–0.9)
CREAT SERPL-MCNC: 1.06 MG/DL (ref 0.5–0.9)
CREAT SERPL-MCNC: 1.1 MG/DL (ref 0.5–0.9)
CREAT SERPL-MCNC: 1.12 MG/DL (ref 0.5–0.9)
CREAT SERPL-MCNC: 1.23 MG/DL (ref 0.5–0.9)
CREAT SERPL-MCNC: 1.38 MG/DL (ref 0.5–0.9)
CREAT SERPL-MCNC: 1.53 MG/DL (ref 0.5–0.9)
CREAT SERPL-MCNC: 1.82 MG/DL (ref 0.5–0.9)
CREAT SERPL-MCNC: 2 MG/DL (ref 0.5–0.9)
CULTURE, BLOOD 2: NORMAL
EKG ATRIAL RATE: 65 BPM
EKG ATRIAL RATE: 73 BPM
EKG P AXIS: 14 DEGREES
EKG P AXIS: 63 DEGREES
EKG P-R INTERVAL: 172 MS
EKG P-R INTERVAL: 286 MS
EKG Q-T INTERVAL: 420 MS
EKG Q-T INTERVAL: 518 MS
EKG QRS DURATION: 190 MS
EKG QRS DURATION: 84 MS
EKG QTC CALCULATION (BAZETT): 462 MS
EKG QTC CALCULATION (BAZETT): 530 MS
EKG R AXIS: -3 DEGREES
EKG R AXIS: 124 DEGREES
EKG T AXIS: 195 DEGREES
EKG T AXIS: 52 DEGREES
EKG VENTRICULAR RATE: 63 BPM
EKG VENTRICULAR RATE: 73 BPM
EOSINOPHILS ABSOLUTE: 0 K/UL (ref 0–0.7)
EOSINOPHILS ABSOLUTE: 0.1 K/UL (ref 0–0.7)
EOSINOPHILS RELATIVE PERCENT: 0.1 %
EOSINOPHILS RELATIVE PERCENT: 0.1 %
EOSINOPHILS RELATIVE PERCENT: 0.3 %
EOSINOPHILS RELATIVE PERCENT: 0.4 %
EOSINOPHILS RELATIVE PERCENT: 0.8 %
EOSINOPHILS RELATIVE PERCENT: 0.9 %
EOSINOPHILS RELATIVE PERCENT: 1 %
EOSINOPHILS RELATIVE PERCENT: 1.3 %
EOSINOPHILS RELATIVE PERCENT: 2 %
EPITHELIAL CELLS, UA: ABNORMAL /HPF (ref 0–5)
EPITHELIAL CELLS, UA: NORMAL /HPF (ref 0–5)
ETHANOL PERCENT: NORMAL G/DL
ETHANOL: <10 MG/DL (ref 0–0.08)
FLUID PATH CONSULT: NO
FLUID TYPE: NORMAL
GFR AFRICAN AMERICAN: 28
GFR AFRICAN AMERICAN: 28.4
GFR AFRICAN AMERICAN: 31.6
GFR AFRICAN AMERICAN: 38.6
GFR AFRICAN AMERICAN: 43.6
GFR AFRICAN AMERICAN: 49.8
GFR AFRICAN AMERICAN: 51
GFR AFRICAN AMERICAN: 55.5
GFR AFRICAN AMERICAN: 56.6
GFR AFRICAN AMERICAN: 57
GFR AFRICAN AMERICAN: 59.1
GFR AFRICAN AMERICAN: 59.7
GFR AFRICAN AMERICAN: 59.7
GFR AFRICAN AMERICAN: >60
GFR NON-AFRICAN AMERICAN: 23
GFR NON-AFRICAN AMERICAN: 23.5
GFR NON-AFRICAN AMERICAN: 26.1
GFR NON-AFRICAN AMERICAN: 31.9
GFR NON-AFRICAN AMERICAN: 36
GFR NON-AFRICAN AMERICAN: 41.1
GFR NON-AFRICAN AMERICAN: 42
GFR NON-AFRICAN AMERICAN: 45.8
GFR NON-AFRICAN AMERICAN: 46.8
GFR NON-AFRICAN AMERICAN: 47
GFR NON-AFRICAN AMERICAN: 48.8
GFR NON-AFRICAN AMERICAN: 49.4
GFR NON-AFRICAN AMERICAN: 49.4
GFR NON-AFRICAN AMERICAN: 50.5
GFR NON-AFRICAN AMERICAN: 51
GFR NON-AFRICAN AMERICAN: 51
GFR NON-AFRICAN AMERICAN: 52
GLOBULIN: 3.2 G/DL (ref 2.3–3.5)
GLOBULIN: 3.4 G/DL (ref 2.3–3.5)
GLOBULIN: 3.6 G/DL (ref 2.3–3.5)
GLOBULIN: 3.6 G/DL (ref 2.3–3.5)
GLUCOSE BLD-MCNC: 109 MG/DL (ref 70–99)
GLUCOSE BLD-MCNC: 110 MG/DL (ref 70–99)
GLUCOSE BLD-MCNC: 114 MG/DL (ref 70–99)
GLUCOSE BLD-MCNC: 123 MG/DL (ref 70–99)
GLUCOSE BLD-MCNC: 123 MG/DL (ref 70–99)
GLUCOSE BLD-MCNC: 124 MG/DL (ref 60–115)
GLUCOSE BLD-MCNC: 126 MG/DL (ref 70–99)
GLUCOSE BLD-MCNC: 132 MG/DL (ref 70–99)
GLUCOSE BLD-MCNC: 137 MG/DL (ref 70–99)
GLUCOSE BLD-MCNC: 149 MG/DL (ref 70–99)
GLUCOSE BLD-MCNC: 153 MG/DL (ref 70–99)
GLUCOSE BLD-MCNC: 162 MG/DL (ref 70–99)
GLUCOSE BLD-MCNC: 174 MG/DL (ref 60–115)
GLUCOSE BLD-MCNC: 207 MG/DL (ref 70–99)
GLUCOSE BLD-MCNC: 216 MG/DL (ref 60–115)
GLUCOSE BLD-MCNC: 78 MG/DL (ref 70–99)
GLUCOSE URINE: NEGATIVE MG/DL
GLUCOSE, FLUID: 106 MG/DL
GRAM STAIN RESULT: NORMAL
HCO3 ARTERIAL: 20.9 MMOL/L (ref 21–29)
HCO3 ARTERIAL: 21.9 MMOL/L (ref 21–29)
HCT VFR BLD CALC: 26.4 % (ref 37–47)
HCT VFR BLD CALC: 27.5 % (ref 37–47)
HCT VFR BLD CALC: 28 % (ref 37–47)
HCT VFR BLD CALC: 28.2 % (ref 37–47)
HCT VFR BLD CALC: 28.6 % (ref 37–47)
HCT VFR BLD CALC: 29.4 % (ref 37–47)
HCT VFR BLD CALC: 30.1 % (ref 37–47)
HCT VFR BLD CALC: 30.2 % (ref 37–47)
HCT VFR BLD CALC: 30.3 % (ref 37–47)
HCT VFR BLD CALC: 30.5 % (ref 37–47)
HCT VFR BLD CALC: 31.4 % (ref 37–47)
HCT VFR BLD CALC: 32.1 % (ref 37–47)
HCT VFR BLD CALC: 32.1 % (ref 37–47)
HCT VFR BLD CALC: 32.9 % (ref 37–47)
HEMOGLOBIN: 10 G/DL (ref 12–16)
HEMOGLOBIN: 10 G/DL (ref 12–16)
HEMOGLOBIN: 10.1 G/DL (ref 12–16)
HEMOGLOBIN: 10.1 G/DL (ref 12–16)
HEMOGLOBIN: 10.1 GM/DL (ref 12–16)
HEMOGLOBIN: 10.2 G/DL (ref 12–16)
HEMOGLOBIN: 10.3 G/DL (ref 12–16)
HEMOGLOBIN: 10.8 G/DL (ref 12–16)
HEMOGLOBIN: 10.9 G/DL (ref 12–16)
HEMOGLOBIN: 8.5 GM/DL (ref 12–16)
HEMOGLOBIN: 8.7 G/DL (ref 12–16)
HEMOGLOBIN: 9.2 G/DL (ref 12–16)
HEMOGLOBIN: 9.2 G/DL (ref 12–16)
HEMOGLOBIN: 9.4 G/DL (ref 12–16)
HEMOGLOBIN: 9.6 G/DL (ref 12–16)
HEMOGLOBIN: 9.6 G/DL (ref 12–16)
HYALINE CASTS: ABNORMAL /HPF (ref 0–5)
HYALINE CASTS: ABNORMAL /HPF (ref 0–5)
INFLUENZA A BY PCR: NEGATIVE
INFLUENZA B BY PCR: NEGATIVE
KETONES, URINE: NEGATIVE MG/DL
LACTATE: 0.55 MMOL/L (ref 0.4–2)
LACTATE: 0.6 MMOL/L (ref 0.4–2)
LACTIC ACID: 0.6 MMOL/L (ref 0.5–2.2)
LACTIC ACID: 0.8 MMOL/L (ref 0.5–2.2)
LACTIC ACID: 0.9 MMOL/L (ref 0.5–2.2)
LACTIC ACID: 0.9 MMOL/L (ref 0.5–2.2)
LACTIC ACID: 1 MMOL/L (ref 0.5–2.2)
LACTIC ACID: 1.1 MMOL/L (ref 0.5–2.2)
LACTIC ACID: 1.2 MMOL/L (ref 0.5–2.2)
LACTIC ACID: 1.2 MMOL/L (ref 0.5–2.2)
LACTIC ACID: 1.8 MMOL/L (ref 0.5–2.2)
LD, FLUID: 474 U/L
LEUKOCYTE ESTERASE, URINE: ABNORMAL
LEUKOCYTE ESTERASE, URINE: NEGATIVE
LIPASE: 167 U/L (ref 12–95)
LIPASE: 69 U/L (ref 12–95)
LV EF: 55 %
LVEF MODALITY: NORMAL
LYMPHOCYTES ABSOLUTE: 0.3 K/UL (ref 1–4.8)
LYMPHOCYTES ABSOLUTE: 0.4 K/UL (ref 1–4.8)
LYMPHOCYTES ABSOLUTE: 0.5 K/UL (ref 1–4.8)
LYMPHOCYTES ABSOLUTE: 0.6 K/UL (ref 1–4.8)
LYMPHOCYTES RELATIVE PERCENT: 10.6 %
LYMPHOCYTES RELATIVE PERCENT: 2.8 %
LYMPHOCYTES RELATIVE PERCENT: 26 %
LYMPHOCYTES RELATIVE PERCENT: 4 %
LYMPHOCYTES RELATIVE PERCENT: 4.9 %
LYMPHOCYTES RELATIVE PERCENT: 5.6 %
LYMPHOCYTES RELATIVE PERCENT: 8.2 %
LYMPHOCYTES RELATIVE PERCENT: 8.6 %
LYMPHOCYTES RELATIVE PERCENT: 9.1 %
LYMPHOCYTES, BODY FLUID: 8 %
Lab: NORMAL
MAGNESIUM: 2.3 MG/DL (ref 1.7–2.4)
MCH RBC QN AUTO: 30 PG (ref 27–31.3)
MCH RBC QN AUTO: 30.1 PG (ref 27–31.3)
MCH RBC QN AUTO: 30.2 PG (ref 27–31.3)
MCH RBC QN AUTO: 30.6 PG (ref 27–31.3)
MCH RBC QN AUTO: 30.7 PG (ref 27–31.3)
MCH RBC QN AUTO: 30.7 PG (ref 27–31.3)
MCH RBC QN AUTO: 30.9 PG (ref 27–31.3)
MCH RBC QN AUTO: 31 PG (ref 27–31.3)
MCH RBC QN AUTO: 31.4 PG (ref 27–31.3)
MCH RBC QN AUTO: 31.9 PG (ref 27–31.3)
MCH RBC QN AUTO: 32.7 PG (ref 27–31.3)
MCHC RBC AUTO-ENTMCNC: 31.7 % (ref 33–37)
MCHC RBC AUTO-ENTMCNC: 32 % (ref 33–37)
MCHC RBC AUTO-ENTMCNC: 32.2 % (ref 33–37)
MCHC RBC AUTO-ENTMCNC: 32.7 % (ref 33–37)
MCHC RBC AUTO-ENTMCNC: 32.8 % (ref 33–37)
MCHC RBC AUTO-ENTMCNC: 33.5 % (ref 33–37)
MCHC RBC AUTO-ENTMCNC: 33.7 % (ref 33–37)
MCHC RBC AUTO-ENTMCNC: 33.8 % (ref 33–37)
MCHC RBC AUTO-ENTMCNC: 34 % (ref 33–37)
MCHC RBC AUTO-ENTMCNC: 34.1 % (ref 33–37)
MCV RBC AUTO: 91.1 FL (ref 82–100)
MCV RBC AUTO: 91.3 FL (ref 82–100)
MCV RBC AUTO: 91.6 FL (ref 82–100)
MCV RBC AUTO: 91.7 FL (ref 82–100)
MCV RBC AUTO: 91.8 FL (ref 82–100)
MCV RBC AUTO: 91.8 FL (ref 82–100)
MCV RBC AUTO: 91.9 FL (ref 82–100)
MCV RBC AUTO: 92.4 FL (ref 82–100)
MCV RBC AUTO: 92.8 FL (ref 82–100)
MCV RBC AUTO: 94.3 FL (ref 82–100)
MCV RBC AUTO: 95 FL (ref 82–100)
MCV RBC AUTO: 95.1 FL (ref 82–100)
MCV RBC AUTO: 95.3 FL (ref 82–100)
MCV RBC AUTO: 96.1 FL (ref 82–100)
METHADONE SCREEN, URINE: NORMAL
MONOCYTE, FLUID: 2 %
MONOCYTES ABSOLUTE: 0.3 K/UL (ref 0.2–0.8)
MONOCYTES ABSOLUTE: 0.3 K/UL (ref 0.2–0.8)
MONOCYTES ABSOLUTE: 0.4 K/UL (ref 0.2–0.8)
MONOCYTES ABSOLUTE: 0.5 K/UL (ref 0.2–0.8)
MONOCYTES ABSOLUTE: 0.5 K/UL (ref 0.2–0.8)
MONOCYTES ABSOLUTE: 0.6 K/UL (ref 0.2–0.8)
MONOCYTES ABSOLUTE: 0.7 K/UL (ref 0.2–0.8)
MONOCYTES ABSOLUTE: 0.8 K/UL (ref 0.2–0.8)
MONOCYTES ABSOLUTE: 1 K/UL (ref 0.2–0.8)
MONOCYTES RELATIVE PERCENT: 11.3 %
MONOCYTES RELATIVE PERCENT: 12.4 %
MONOCYTES RELATIVE PERCENT: 14 %
MONOCYTES RELATIVE PERCENT: 4.8 %
MONOCYTES RELATIVE PERCENT: 5.3 %
MONOCYTES RELATIVE PERCENT: 7.4 %
MONOCYTES RELATIVE PERCENT: 7.5 %
MONOCYTES RELATIVE PERCENT: 7.9 %
MONOCYTES RELATIVE PERCENT: 8 %
NEUTROPHIL, FLUID: 90 %
NEUTROPHILS ABSOLUTE: 1.3 K/UL (ref 1.4–6.5)
NEUTROPHILS ABSOLUTE: 10 K/UL (ref 1.4–6.5)
NEUTROPHILS ABSOLUTE: 10.9 K/UL (ref 1.4–6.5)
NEUTROPHILS ABSOLUTE: 3.4 K/UL (ref 1.4–6.5)
NEUTROPHILS ABSOLUTE: 3.5 K/UL (ref 1.4–6.5)
NEUTROPHILS ABSOLUTE: 4.5 K/UL (ref 1.4–6.5)
NEUTROPHILS ABSOLUTE: 5.9 K/UL (ref 1.4–6.5)
NEUTROPHILS ABSOLUTE: 6 K/UL (ref 1.4–6.5)
NEUTROPHILS ABSOLUTE: 9.2 K/UL (ref 1.4–6.5)
NEUTROPHILS RELATIVE PERCENT: 57 %
NEUTROPHILS RELATIVE PERCENT: 76.7 %
NEUTROPHILS RELATIVE PERCENT: 78.7 %
NEUTROPHILS RELATIVE PERCENT: 80.5 %
NEUTROPHILS RELATIVE PERCENT: 85.1 %
NEUTROPHILS RELATIVE PERCENT: 86.6 %
NEUTROPHILS RELATIVE PERCENT: 86.7 %
NEUTROPHILS RELATIVE PERCENT: 87.5 %
NEUTROPHILS RELATIVE PERCENT: 92 %
NITRITE, URINE: NEGATIVE
NUCLEATED CELLS FLUID: 470 /CUMM
NUMBER OF CELLS COUNTED FLUID: 100
O2 SAT, ARTERIAL: 93 % (ref 93–100)
O2 SAT, ARTERIAL: 93 % (ref 93–100)
OPIATE SCREEN URINE: NORMAL
ORGANISM: ABNORMAL
OXYCODONE URINE: NORMAL
PCO2 ARTERIAL: 29 MM HG (ref 35–45)
PCO2 ARTERIAL: 32 MM HG (ref 35–45)
PDW BLD-RTO: 14.6 % (ref 11.5–14.5)
PDW BLD-RTO: 14.9 % (ref 11.5–14.5)
PDW BLD-RTO: 15.1 % (ref 11.5–14.5)
PDW BLD-RTO: 15.1 % (ref 11.5–14.5)
PDW BLD-RTO: 15.2 % (ref 11.5–14.5)
PDW BLD-RTO: 15.3 % (ref 11.5–14.5)
PDW BLD-RTO: 15.4 % (ref 11.5–14.5)
PDW BLD-RTO: 15.4 % (ref 11.5–14.5)
PDW BLD-RTO: 15.5 % (ref 11.5–14.5)
PDW BLD-RTO: 15.6 % (ref 11.5–14.5)
PDW BLD-RTO: 15.6 % (ref 11.5–14.5)
PDW BLD-RTO: 15.7 % (ref 11.5–14.5)
PDW BLD-RTO: 16 % (ref 11.5–14.5)
PDW BLD-RTO: 17.6 % (ref 11.5–14.5)
PERFORMED ON: ABNORMAL
PH ARTERIAL: 7.44 (ref 7.35–7.45)
PH ARTERIAL: 7.47 (ref 7.35–7.45)
PH UA: 5 (ref 5–9)
PH UA: 5.5 (ref 5–9)
PHENCYCLIDINE SCREEN URINE: NORMAL
PLATELET # BLD: 106 K/UL (ref 130–400)
PLATELET # BLD: 129 K/UL (ref 130–400)
PLATELET # BLD: 133 K/UL (ref 130–400)
PLATELET # BLD: 147 K/UL (ref 130–400)
PLATELET # BLD: 174 K/UL (ref 130–400)
PLATELET # BLD: 179 K/UL (ref 130–400)
PLATELET # BLD: 179 K/UL (ref 130–400)
PLATELET # BLD: 183 K/UL (ref 130–400)
PLATELET # BLD: 184 K/UL (ref 130–400)
PLATELET # BLD: 194 K/UL (ref 130–400)
PLATELET # BLD: 203 K/UL (ref 130–400)
PLATELET # BLD: 220 K/UL (ref 130–400)
PLATELET # BLD: 255 K/UL (ref 130–400)
PLATELET # BLD: 65 K/UL (ref 130–400)
PLATELET SLIDE REVIEW: ABNORMAL
PO2 ARTERIAL: 62 MM HG (ref 75–108)
PO2 ARTERIAL: 63 MM HG (ref 75–108)
POC CHLORIDE: 116 MEQ/L (ref 99–110)
POC CHLORIDE: 118 MEQ/L (ref 99–110)
POC CREATININE WHOLE BLOOD: 1.2
POC CREATININE WHOLE BLOOD: 2
POC CREATININE: 1 MG/DL (ref 0.6–1.2)
POC CREATININE: 1.1 MG/DL (ref 0.6–1.2)
POC CREATININE: 1.2 MG/DL (ref 0.6–1.2)
POC CREATININE: 2 MG/DL (ref 0.6–1.2)
POC FIO2: 4
POC FIO2: 40
POC HEMATOCRIT: 25 % (ref 36–48)
POC HEMATOCRIT: 30 % (ref 36–48)
POC POTASSIUM: 3.2 MEQ/L (ref 3.5–5.1)
POC POTASSIUM: 3.6 MEQ/L (ref 3.5–5.1)
POC SAMPLE TYPE: ABNORMAL
POC SODIUM: 150 MEQ/L (ref 136–145)
POC SODIUM: 151 MEQ/L (ref 136–145)
POTASSIUM REFLEX MAGNESIUM: 3.3 MEQ/L (ref 3.4–4.9)
POTASSIUM REFLEX MAGNESIUM: 3.6 MEQ/L (ref 3.4–4.9)
POTASSIUM REFLEX MAGNESIUM: 3.7 MEQ/L (ref 3.4–4.9)
POTASSIUM REFLEX MAGNESIUM: 3.8 MEQ/L (ref 3.4–4.9)
POTASSIUM REFLEX MAGNESIUM: 4 MEQ/L (ref 3.4–4.9)
POTASSIUM REFLEX MAGNESIUM: 4.1 MEQ/L (ref 3.4–4.9)
POTASSIUM REFLEX MAGNESIUM: 4.3 MEQ/L (ref 3.4–4.9)
POTASSIUM SERPL-SCNC: 3.5 MEQ/L (ref 3.4–4.9)
POTASSIUM SERPL-SCNC: 4.2 MEQ/L (ref 3.4–4.9)
POTASSIUM SERPL-SCNC: 4.7 MEQ/L (ref 3.4–4.9)
POTASSIUM SERPL-SCNC: 4.8 MEQ/L (ref 3.4–4.9)
PRO-BNP: 2636 PG/ML
PRO-BNP: 4850 PG/ML
PRO-BNP: NORMAL PG/ML
PROCALCITONIN: 0.34 NG/ML (ref 0–0.15)
PROCALCITONIN: 0.45 NG/ML (ref 0–0.15)
PROCALCITONIN: 6.1 NG/ML (ref 0–0.15)
PROPOXYPHENE SCREEN: NORMAL
PROTEIN FLUID: 3.5 G/DL
PROTEIN UA: 30 MG/DL
PROTEIN UA: NEGATIVE MG/DL
RBC # BLD: 2.88 M/UL (ref 4.2–5.4)
RBC # BLD: 2.89 M/UL (ref 4.2–5.4)
RBC # BLD: 3.05 M/UL (ref 4.2–5.4)
RBC # BLD: 3.09 M/UL (ref 4.2–5.4)
RBC # BLD: 3.12 M/UL (ref 4.2–5.4)
RBC # BLD: 3.12 M/UL (ref 4.2–5.4)
RBC # BLD: 3.26 M/UL (ref 4.2–5.4)
RBC # BLD: 3.3 M/UL (ref 4.2–5.4)
RBC # BLD: 3.34 M/UL (ref 4.2–5.4)
RBC # BLD: 3.37 M/UL (ref 4.2–5.4)
RBC # BLD: 3.57 M/UL (ref 4.2–5.4)
RBC FLUID: 832 /CUMM
RBC UA: ABNORMAL /HPF (ref 0–5)
RBC UA: NORMAL /HPF (ref 0–5)
REASON FOR REJECTION: NORMAL
REJECTED TEST: NORMAL
SLIDE REVIEW: ABNORMAL
SODIUM BLD-SCNC: 136 MEQ/L (ref 135–144)
SODIUM BLD-SCNC: 141 MEQ/L (ref 135–144)
SODIUM BLD-SCNC: 141 MEQ/L (ref 135–144)
SODIUM BLD-SCNC: 144 MEQ/L (ref 135–144)
SODIUM BLD-SCNC: 145 MEQ/L (ref 135–144)
SODIUM BLD-SCNC: 145 MEQ/L (ref 135–144)
SODIUM BLD-SCNC: 146 MEQ/L (ref 135–144)
SODIUM BLD-SCNC: 146 MEQ/L (ref 135–144)
SODIUM BLD-SCNC: 147 MEQ/L (ref 135–144)
SODIUM BLD-SCNC: 147 MEQ/L (ref 135–144)
SODIUM BLD-SCNC: 150 MEQ/L (ref 135–144)
SPECIFIC GRAVITY UA: 1.01 (ref 1–1.03)
SPECIFIC GRAVITY UA: 1.01 (ref 1–1.03)
SPECIFIC GRAVITY UA: 1.02 (ref 1–1.03)
SPECIFIC GRAVITY UA: 1.04 (ref 1–1.03)
TCO2 ARTERIAL: 22 (ref 22–29)
TCO2 ARTERIAL: 23 (ref 22–29)
TOTAL CK: 51 U/L (ref 0–170)
TOTAL PROTEIN: 6.3 G/DL (ref 6.3–8)
TOTAL PROTEIN: 6.5 G/DL (ref 6.3–8)
TOTAL PROTEIN: 6.9 G/DL (ref 6.3–8)
TOTAL PROTEIN: 7.4 G/DL (ref 6.3–8)
TROPONIN: 0.03 NG/ML (ref 0–0.01)
TROPONIN: <0.01 NG/ML (ref 0–0.01)
URINE CULTURE, ROUTINE: ABNORMAL
URINE CULTURE, ROUTINE: NORMAL
URINE REFLEX TO CULTURE: ABNORMAL
URINE REFLEX TO CULTURE: YES
URINE REFLEX TO CULTURE: YES
UROBILINOGEN, URINE: 0.2 E.U./DL
UROBILINOGEN, URINE: 1 E.U./DL
WBC # BLD: 10.5 K/UL (ref 4.8–10.8)
WBC # BLD: 10.9 K/UL (ref 4.8–10.8)
WBC # BLD: 11.1 K/UL (ref 4.8–10.8)
WBC # BLD: 12.6 K/UL (ref 4.8–10.8)
WBC # BLD: 2.2 K/UL (ref 4.8–10.8)
WBC # BLD: 4.2 K/UL (ref 4.8–10.8)
WBC # BLD: 4.5 K/UL (ref 4.8–10.8)
WBC # BLD: 5.8 K/UL (ref 4.8–10.8)
WBC # BLD: 6.3 K/UL (ref 4.8–10.8)
WBC # BLD: 6.5 K/UL (ref 4.8–10.8)
WBC # BLD: 6.9 K/UL (ref 4.8–10.8)
WBC # BLD: 7 K/UL (ref 4.8–10.8)
WBC UA: ABNORMAL /HPF (ref 0–5)
WBC UA: NORMAL /HPF (ref 0–5)

## 2020-01-01 PROCEDURE — 6360000002 HC RX W HCPCS: Performed by: INTERNAL MEDICINE

## 2020-01-01 PROCEDURE — 99285 EMERGENCY DEPT VISIT HI MDM: CPT

## 2020-01-01 PROCEDURE — 85025 COMPLETE CBC W/AUTO DIFF WBC: CPT

## 2020-01-01 PROCEDURE — 1090F PRES/ABSN URINE INCON ASSESS: CPT | Performed by: INTERNAL MEDICINE

## 2020-01-01 PROCEDURE — 84484 ASSAY OF TROPONIN QUANT: CPT

## 2020-01-01 PROCEDURE — 6370000000 HC RX 637 (ALT 250 FOR IP): Performed by: INTERNAL MEDICINE

## 2020-01-01 PROCEDURE — 83880 ASSAY OF NATRIURETIC PEPTIDE: CPT

## 2020-01-01 PROCEDURE — 1210000000 HC MED SURG R&B

## 2020-01-01 PROCEDURE — 80048 BASIC METABOLIC PNL TOTAL CA: CPT

## 2020-01-01 PROCEDURE — 97535 SELF CARE MNGMENT TRAINING: CPT

## 2020-01-01 PROCEDURE — 36415 COLL VENOUS BLD VENIPUNCTURE: CPT

## 2020-01-01 PROCEDURE — 92611 MOTION FLUOROSCOPY/SWALLOW: CPT

## 2020-01-01 PROCEDURE — 82945 GLUCOSE OTHER FLUID: CPT

## 2020-01-01 PROCEDURE — 70450 CT HEAD/BRAIN W/O DYE: CPT

## 2020-01-01 PROCEDURE — 99232 SBSQ HOSP IP/OBS MODERATE 35: CPT | Performed by: INTERNAL MEDICINE

## 2020-01-01 PROCEDURE — 2700000000 HC OXYGEN THERAPY PER DAY

## 2020-01-01 PROCEDURE — C9113 INJ PANTOPRAZOLE SODIUM, VIA: HCPCS | Performed by: HOSPITALIST

## 2020-01-01 PROCEDURE — 99233 SBSQ HOSP IP/OBS HIGH 50: CPT | Performed by: RADIOLOGY

## 2020-01-01 PROCEDURE — 85014 HEMATOCRIT: CPT

## 2020-01-01 PROCEDURE — 71260 CT THORAX DX C+: CPT

## 2020-01-01 PROCEDURE — 84145 PROCALCITONIN (PCT): CPT

## 2020-01-01 PROCEDURE — 87086 URINE CULTURE/COLONY COUNT: CPT

## 2020-01-01 PROCEDURE — 80053 COMPREHEN METABOLIC PANEL: CPT

## 2020-01-01 PROCEDURE — 93010 ELECTROCARDIOGRAM REPORT: CPT | Performed by: INTERNAL MEDICINE

## 2020-01-01 PROCEDURE — 82565 ASSAY OF CREATININE: CPT

## 2020-01-01 PROCEDURE — 6360000002 HC RX W HCPCS: Performed by: PHYSICIAN ASSISTANT

## 2020-01-01 PROCEDURE — 87040 BLOOD CULTURE FOR BACTERIA: CPT

## 2020-01-01 PROCEDURE — 87205 SMEAR GRAM STAIN: CPT

## 2020-01-01 PROCEDURE — 85027 COMPLETE CBC AUTOMATED: CPT

## 2020-01-01 PROCEDURE — G8427 DOCREV CUR MEDS BY ELIG CLIN: HCPCS | Performed by: INTERNAL MEDICINE

## 2020-01-01 PROCEDURE — 99223 1ST HOSP IP/OBS HIGH 75: CPT | Performed by: INTERNAL MEDICINE

## 2020-01-01 PROCEDURE — 83690 ASSAY OF LIPASE: CPT

## 2020-01-01 PROCEDURE — 92526 ORAL FUNCTION THERAPY: CPT

## 2020-01-01 PROCEDURE — 83605 ASSAY OF LACTIC ACID: CPT

## 2020-01-01 PROCEDURE — 97162 PT EVAL MOD COMPLEX 30 MIN: CPT

## 2020-01-01 PROCEDURE — 87070 CULTURE OTHR SPECIMN AEROBIC: CPT

## 2020-01-01 PROCEDURE — 84132 ASSAY OF SERUM POTASSIUM: CPT

## 2020-01-01 PROCEDURE — 6370000000 HC RX 637 (ALT 250 FOR IP): Performed by: HOSPITALIST

## 2020-01-01 PROCEDURE — 6360000002 HC RX W HCPCS: Performed by: HOSPITALIST

## 2020-01-01 PROCEDURE — 6360000002 HC RX W HCPCS: Performed by: EMERGENCY MEDICINE

## 2020-01-01 PROCEDURE — 96365 THER/PROPH/DIAG IV INF INIT: CPT

## 2020-01-01 PROCEDURE — 81001 URINALYSIS AUTO W/SCOPE: CPT

## 2020-01-01 PROCEDURE — 83735 ASSAY OF MAGNESIUM: CPT

## 2020-01-01 PROCEDURE — 2580000003 HC RX 258: Performed by: INTERNAL MEDICINE

## 2020-01-01 PROCEDURE — 84295 ASSAY OF SERUM SODIUM: CPT

## 2020-01-01 PROCEDURE — 96375 TX/PRO/DX INJ NEW DRUG ADDON: CPT

## 2020-01-01 PROCEDURE — 93307 TTE W/O DOPPLER COMPLETE: CPT

## 2020-01-01 PROCEDURE — 86141 C-REACTIVE PROTEIN HS: CPT

## 2020-01-01 PROCEDURE — P9612 CATHETERIZE FOR URINE SPEC: HCPCS

## 2020-01-01 PROCEDURE — 2580000003 HC RX 258: Performed by: PHYSICIAN ASSISTANT

## 2020-01-01 PROCEDURE — 74230 X-RAY XM SWLNG FUNCJ C+: CPT

## 2020-01-01 PROCEDURE — 94668 MNPJ CHEST WALL SBSQ: CPT

## 2020-01-01 PROCEDURE — 87077 CULTURE AEROBIC IDENTIFY: CPT

## 2020-01-01 PROCEDURE — 99283 EMERGENCY DEPT VISIT LOW MDM: CPT

## 2020-01-01 PROCEDURE — 84157 ASSAY OF PROTEIN OTHER: CPT

## 2020-01-01 PROCEDURE — 97167 OT EVAL HIGH COMPLEX 60 MIN: CPT

## 2020-01-01 PROCEDURE — 6370000000 HC RX 637 (ALT 250 FOR IP): Performed by: PHYSICIAN ASSISTANT

## 2020-01-01 PROCEDURE — 87186 SC STD MICRODIL/AGAR DIL: CPT

## 2020-01-01 PROCEDURE — 71045 X-RAY EXAM CHEST 1 VIEW: CPT

## 2020-01-01 PROCEDURE — 1111F DSCHRG MED/CURRENT MED MERGE: CPT | Performed by: INTERNAL MEDICINE

## 2020-01-01 PROCEDURE — 94761 N-INVAS EAR/PLS OXIMETRY MLT: CPT

## 2020-01-01 PROCEDURE — 71046 X-RAY EXAM CHEST 2 VIEWS: CPT

## 2020-01-01 PROCEDURE — 6360000004 HC RX CONTRAST MEDICATION: Performed by: PHYSICIAN ASSISTANT

## 2020-01-01 PROCEDURE — 74176 CT ABD & PELVIS W/O CONTRAST: CPT

## 2020-01-01 PROCEDURE — 94667 MNPJ CHEST WALL 1ST: CPT

## 2020-01-01 PROCEDURE — 82330 ASSAY OF CALCIUM: CPT

## 2020-01-01 PROCEDURE — 88112 CYTOPATH CELL ENHANCE TECH: CPT

## 2020-01-01 PROCEDURE — 2500000003 HC RX 250 WO HCPCS: Performed by: RADIOLOGY

## 2020-01-01 PROCEDURE — 2500000003 HC RX 250 WO HCPCS: Performed by: INTERNAL MEDICINE

## 2020-01-01 PROCEDURE — 2580000003 HC RX 258: Performed by: HOSPITALIST

## 2020-01-01 PROCEDURE — 80307 DRUG TEST PRSMV CHEM ANLYZR: CPT

## 2020-01-01 PROCEDURE — 88305 TISSUE EXAM BY PATHOLOGIST: CPT

## 2020-01-01 PROCEDURE — 97166 OT EVAL MOD COMPLEX 45 MIN: CPT

## 2020-01-01 PROCEDURE — 2580000003 HC RX 258: Performed by: EMERGENCY MEDICINE

## 2020-01-01 PROCEDURE — G8484 FLU IMMUNIZE NO ADMIN: HCPCS | Performed by: INTERNAL MEDICINE

## 2020-01-01 PROCEDURE — 96361 HYDRATE IV INFUSION ADD-ON: CPT

## 2020-01-01 PROCEDURE — 93005 ELECTROCARDIOGRAM TRACING: CPT | Performed by: PHYSICIAN ASSISTANT

## 2020-01-01 PROCEDURE — 32555 ASPIRATE PLEURA W/ IMAGING: CPT | Performed by: RADIOLOGY

## 2020-01-01 PROCEDURE — 94664 DEMO&/EVAL PT USE INHALER: CPT

## 2020-01-01 PROCEDURE — 96367 TX/PROPH/DG ADDL SEQ IV INF: CPT

## 2020-01-01 PROCEDURE — 97112 NEUROMUSCULAR REEDUCATION: CPT

## 2020-01-01 PROCEDURE — 1123F ACP DISCUSS/DSCN MKR DOCD: CPT | Performed by: INTERNAL MEDICINE

## 2020-01-01 PROCEDURE — 97163 PT EVAL HIGH COMPLEX 45 MIN: CPT

## 2020-01-01 PROCEDURE — 74177 CT ABD & PELVIS W/CONTRAST: CPT

## 2020-01-01 PROCEDURE — 99214 OFFICE O/P EST MOD 30 MIN: CPT | Performed by: INTERNAL MEDICINE

## 2020-01-01 PROCEDURE — 71250 CT THORAX DX C-: CPT

## 2020-01-01 PROCEDURE — 82435 ASSAY OF BLOOD CHLORIDE: CPT

## 2020-01-01 PROCEDURE — 87502 INFLUENZA DNA AMP PROBE: CPT

## 2020-01-01 PROCEDURE — 82550 ASSAY OF CK (CPK): CPT

## 2020-01-01 PROCEDURE — G8417 CALC BMI ABV UP PARAM F/U: HCPCS | Performed by: INTERNAL MEDICINE

## 2020-01-01 PROCEDURE — 92610 EVALUATE SWALLOWING FUNCTION: CPT

## 2020-01-01 PROCEDURE — 89051 BODY FLUID CELL COUNT: CPT

## 2020-01-01 PROCEDURE — G0480 DRUG TEST DEF 1-7 CLASSES: HCPCS

## 2020-01-01 PROCEDURE — 83615 LACTATE (LD) (LDH) ENZYME: CPT

## 2020-01-01 PROCEDURE — 82803 BLOOD GASES ANY COMBINATION: CPT

## 2020-01-01 PROCEDURE — 36600 WITHDRAWAL OF ARTERIAL BLOOD: CPT

## 2020-01-01 PROCEDURE — 1036F TOBACCO NON-USER: CPT | Performed by: INTERNAL MEDICINE

## 2020-01-01 PROCEDURE — 99223 1ST HOSP IP/OBS HIGH 75: CPT | Performed by: RADIOLOGY

## 2020-01-01 PROCEDURE — 4040F PNEUMOC VAC/ADMIN/RCVD: CPT | Performed by: INTERNAL MEDICINE

## 2020-01-01 PROCEDURE — 94640 AIRWAY INHALATION TREATMENT: CPT

## 2020-01-01 PROCEDURE — 99222 1ST HOSP IP/OBS MODERATE 55: CPT | Performed by: INTERNAL MEDICINE

## 2020-01-01 PROCEDURE — 0W9B3ZZ DRAINAGE OF LEFT PLEURAL CAVITY, PERCUTANEOUS APPROACH: ICD-10-PCS | Performed by: RADIOLOGY

## 2020-01-01 PROCEDURE — C1729 CATH, DRAINAGE: HCPCS

## 2020-01-01 PROCEDURE — 93005 ELECTROCARDIOGRAM TRACING: CPT | Performed by: EMERGENCY MEDICINE

## 2020-01-01 PROCEDURE — 71046 X-RAY EXAM CHEST 2 VIEWS: CPT | Performed by: RADIOLOGY

## 2020-01-01 RX ORDER — ACETAMINOPHEN 80 MG
TABLET,CHEWABLE ORAL ONCE
Status: DISCONTINUED | OUTPATIENT
Start: 2020-01-01 | End: 2020-01-01 | Stop reason: HOSPADM

## 2020-01-01 RX ORDER — ONDANSETRON 2 MG/ML
4 INJECTION INTRAMUSCULAR; INTRAVENOUS ONCE
Status: COMPLETED | OUTPATIENT
Start: 2020-01-01 | End: 2020-01-01

## 2020-01-01 RX ORDER — LORAZEPAM 2 MG/ML
1 INJECTION INTRAMUSCULAR ONCE
Status: DISCONTINUED | OUTPATIENT
Start: 2020-01-01 | End: 2020-01-01 | Stop reason: HOSPADM

## 2020-01-01 RX ORDER — SODIUM CHLORIDE 0.9 % (FLUSH) 0.9 %
10 SYRINGE (ML) INJECTION EVERY 12 HOURS SCHEDULED
Status: DISCONTINUED | OUTPATIENT
Start: 2020-01-01 | End: 2020-01-01 | Stop reason: HOSPADM

## 2020-01-01 RX ORDER — CIPROFLOXACIN 500 MG/1
500 TABLET, FILM COATED ORAL 2 TIMES DAILY
Qty: 14 TABLET | Refills: 0 | Status: SHIPPED | OUTPATIENT
Start: 2020-01-01 | End: 2020-12-10

## 2020-01-01 RX ORDER — AMLODIPINE BESYLATE 10 MG/1
10 TABLET ORAL DAILY
Status: DISCONTINUED | OUTPATIENT
Start: 2020-01-01 | End: 2020-01-01 | Stop reason: HOSPADM

## 2020-01-01 RX ORDER — PANTOPRAZOLE SODIUM 40 MG/1
40 TABLET, DELAYED RELEASE ORAL
Status: DISCONTINUED | OUTPATIENT
Start: 2020-01-01 | End: 2020-01-01 | Stop reason: HOSPADM

## 2020-01-01 RX ORDER — SODIUM CHLORIDE 9 MG/ML
INJECTION, SOLUTION INTRAVENOUS CONTINUOUS
Status: DISCONTINUED | OUTPATIENT
Start: 2020-01-01 | End: 2020-01-01

## 2020-01-01 RX ORDER — IPRATROPIUM BROMIDE AND ALBUTEROL SULFATE 2.5; .5 MG/3ML; MG/3ML
1 SOLUTION RESPIRATORY (INHALATION) ONCE
Status: COMPLETED | OUTPATIENT
Start: 2020-01-01 | End: 2020-01-01

## 2020-01-01 RX ORDER — CARVEDILOL 12.5 MG/1
12.5 TABLET ORAL 2 TIMES DAILY WITH MEALS
Status: DISCONTINUED | OUTPATIENT
Start: 2020-01-01 | End: 2020-01-01 | Stop reason: HOSPADM

## 2020-01-01 RX ORDER — SPIRONOLACTONE 25 MG/1
25 TABLET ORAL DAILY
Status: DISCONTINUED | OUTPATIENT
Start: 2020-01-01 | End: 2020-01-01

## 2020-01-01 RX ORDER — LEVETIRACETAM 500 MG/1
500 TABLET ORAL 2 TIMES DAILY
Status: DISCONTINUED | OUTPATIENT
Start: 2020-01-01 | End: 2020-01-01 | Stop reason: HOSPADM

## 2020-01-01 RX ORDER — SODIUM CHLORIDE 9 MG/ML
10 INJECTION INTRAVENOUS DAILY
Status: DISCONTINUED | OUTPATIENT
Start: 2020-01-01 | End: 2020-01-01

## 2020-01-01 RX ORDER — IPRATROPIUM BROMIDE AND ALBUTEROL SULFATE 2.5; .5 MG/3ML; MG/3ML
1 SOLUTION RESPIRATORY (INHALATION) EVERY 4 HOURS PRN
Status: DISCONTINUED | OUTPATIENT
Start: 2020-01-01 | End: 2020-01-01 | Stop reason: HOSPADM

## 2020-01-01 RX ORDER — SODIUM CHLORIDE 450 MG/100ML
INJECTION, SOLUTION INTRAVENOUS CONTINUOUS
Status: DISCONTINUED | OUTPATIENT
Start: 2020-01-01 | End: 2020-01-01

## 2020-01-01 RX ORDER — IPRATROPIUM BROMIDE AND ALBUTEROL SULFATE 2.5; .5 MG/3ML; MG/3ML
1 SOLUTION RESPIRATORY (INHALATION)
Status: DISCONTINUED | OUTPATIENT
Start: 2020-01-01 | End: 2020-01-01

## 2020-01-01 RX ORDER — AMOXICILLIN AND CLAVULANATE POTASSIUM 875; 125 MG/1; MG/1
1 TABLET, FILM COATED ORAL EVERY 12 HOURS SCHEDULED
Qty: 14 TABLET | Refills: 0 | Status: ON HOLD | OUTPATIENT
Start: 2020-01-01 | End: 2020-01-01 | Stop reason: HOSPADM

## 2020-01-01 RX ORDER — SODIUM CHLORIDE 0.9 % (FLUSH) 0.9 %
10 SYRINGE (ML) INJECTION PRN
Status: DISCONTINUED | OUTPATIENT
Start: 2020-01-01 | End: 2020-01-01 | Stop reason: HOSPADM

## 2020-01-01 RX ORDER — POLYETHYLENE GLYCOL 3350 17 G/17G
17 POWDER, FOR SOLUTION ORAL DAILY PRN
Status: DISCONTINUED | OUTPATIENT
Start: 2020-01-01 | End: 2020-01-01 | Stop reason: HOSPADM

## 2020-01-01 RX ORDER — LIDOCAINE HYDROCHLORIDE 20 MG/ML
INJECTION, SOLUTION INFILTRATION; PERINEURAL
Status: COMPLETED | OUTPATIENT
Start: 2020-01-01 | End: 2020-01-01

## 2020-01-01 RX ORDER — ISOSORBIDE MONONITRATE 30 MG/1
30 TABLET, EXTENDED RELEASE ORAL DAILY
Status: DISCONTINUED | OUTPATIENT
Start: 2020-01-01 | End: 2020-01-01 | Stop reason: HOSPADM

## 2020-01-01 RX ORDER — 0.9 % SODIUM CHLORIDE 0.9 %
1000 INTRAVENOUS SOLUTION INTRAVENOUS ONCE
Status: COMPLETED | OUTPATIENT
Start: 2020-01-01 | End: 2020-01-01

## 2020-01-01 RX ORDER — SPIRONOLACTONE 25 MG/1
25 TABLET ORAL 2 TIMES DAILY
Status: DISCONTINUED | OUTPATIENT
Start: 2020-01-01 | End: 2020-01-01 | Stop reason: HOSPADM

## 2020-01-01 RX ORDER — PHENYTOIN SODIUM 100 MG/1
100 CAPSULE, EXTENDED RELEASE ORAL 2 TIMES DAILY
Status: DISCONTINUED | OUTPATIENT
Start: 2020-01-01 | End: 2020-01-01 | Stop reason: HOSPADM

## 2020-01-01 RX ORDER — CEPHALEXIN 500 MG/1
500 CAPSULE ORAL 4 TIMES DAILY
Qty: 28 CAPSULE | Refills: 0 | Status: SHIPPED | OUTPATIENT
Start: 2020-01-01 | End: 2020-01-01

## 2020-01-01 RX ORDER — ISOSORBIDE MONONITRATE 30 MG/1
30 TABLET, EXTENDED RELEASE ORAL ONCE
Status: DISCONTINUED | OUTPATIENT
Start: 2020-01-01 | End: 2020-01-01

## 2020-01-01 RX ORDER — DEXTROSE MONOHYDRATE 50 MG/ML
INJECTION, SOLUTION INTRAVENOUS CONTINUOUS
Status: DISCONTINUED | OUTPATIENT
Start: 2020-01-01 | End: 2020-01-01

## 2020-01-01 RX ORDER — ISOSORBIDE MONONITRATE 30 MG/1
30 TABLET, EXTENDED RELEASE ORAL DAILY
Status: DISCONTINUED | OUTPATIENT
Start: 2020-01-01 | End: 2020-01-01

## 2020-01-01 RX ORDER — PROMETHAZINE HYDROCHLORIDE 25 MG/1
12.5 TABLET ORAL EVERY 6 HOURS PRN
Status: DISCONTINUED | OUTPATIENT
Start: 2020-01-01 | End: 2020-01-01 | Stop reason: HOSPADM

## 2020-01-01 RX ORDER — KETOROLAC TROMETHAMINE 30 MG/ML
30 INJECTION, SOLUTION INTRAMUSCULAR; INTRAVENOUS ONCE
Status: COMPLETED | OUTPATIENT
Start: 2020-01-01 | End: 2020-01-01

## 2020-01-01 RX ORDER — PANTOPRAZOLE SODIUM 40 MG/10ML
40 INJECTION, POWDER, LYOPHILIZED, FOR SOLUTION INTRAVENOUS DAILY
Status: DISCONTINUED | OUTPATIENT
Start: 2020-01-01 | End: 2020-01-01

## 2020-01-01 RX ORDER — HALOPERIDOL 5 MG/ML
1 INJECTION INTRAMUSCULAR EVERY 6 HOURS PRN
Status: DISCONTINUED | OUTPATIENT
Start: 2020-01-01 | End: 2020-01-01 | Stop reason: HOSPADM

## 2020-01-01 RX ORDER — FUROSEMIDE 40 MG/1
40 TABLET ORAL DAILY
Status: DISCONTINUED | OUTPATIENT
Start: 2020-01-01 | End: 2020-01-01 | Stop reason: HOSPADM

## 2020-01-01 RX ORDER — ACETAMINOPHEN 650 MG/1
650 SUPPOSITORY RECTAL EVERY 6 HOURS PRN
Status: DISCONTINUED | OUTPATIENT
Start: 2020-01-01 | End: 2020-01-01 | Stop reason: HOSPADM

## 2020-01-01 RX ORDER — ISOSORBIDE MONONITRATE 60 MG/1
60 TABLET, EXTENDED RELEASE ORAL DAILY
Status: DISCONTINUED | OUTPATIENT
Start: 2020-01-01 | End: 2020-01-01 | Stop reason: HOSPADM

## 2020-01-01 RX ORDER — HYDRALAZINE HYDROCHLORIDE 20 MG/ML
10 INJECTION INTRAMUSCULAR; INTRAVENOUS EVERY 4 HOURS PRN
Status: DISCONTINUED | OUTPATIENT
Start: 2020-01-01 | End: 2020-01-01 | Stop reason: HOSPADM

## 2020-01-01 RX ORDER — ISOSORBIDE MONONITRATE 30 MG/1
60 TABLET, EXTENDED RELEASE ORAL DAILY
Qty: 90 TABLET | Refills: 3 | Status: SHIPPED | OUTPATIENT
Start: 2020-01-01

## 2020-01-01 RX ORDER — ONDANSETRON 2 MG/ML
4 INJECTION INTRAMUSCULAR; INTRAVENOUS EVERY 6 HOURS PRN
Status: DISCONTINUED | OUTPATIENT
Start: 2020-01-01 | End: 2020-01-01 | Stop reason: HOSPADM

## 2020-01-01 RX ORDER — AMOXICILLIN AND CLAVULANATE POTASSIUM 875; 125 MG/1; MG/1
1 TABLET, FILM COATED ORAL 2 TIMES DAILY
Qty: 28 TABLET | Refills: 0 | Status: SHIPPED | OUTPATIENT
Start: 2020-01-01 | End: 2020-01-01

## 2020-01-01 RX ORDER — ACETAMINOPHEN 325 MG/1
650 TABLET ORAL EVERY 4 HOURS PRN
Status: DISCONTINUED | OUTPATIENT
Start: 2020-01-01 | End: 2020-01-01 | Stop reason: HOSPADM

## 2020-01-01 RX ORDER — ONDANSETRON 4 MG/1
4 TABLET, ORALLY DISINTEGRATING ORAL EVERY 8 HOURS PRN
Qty: 20 TABLET | Refills: 0 | Status: SHIPPED | OUTPATIENT
Start: 2020-01-01

## 2020-01-01 RX ORDER — AMOXICILLIN AND CLAVULANATE POTASSIUM 875; 125 MG/1; MG/1
1 TABLET, FILM COATED ORAL EVERY 12 HOURS SCHEDULED
Status: DISCONTINUED | OUTPATIENT
Start: 2020-01-01 | End: 2020-01-01 | Stop reason: HOSPADM

## 2020-01-01 RX ORDER — POTASSIUM CHLORIDE 20MEQ/15ML
40 LIQUID (ML) ORAL ONCE
Status: COMPLETED | OUTPATIENT
Start: 2020-01-01 | End: 2020-01-01

## 2020-01-01 RX ORDER — ACETAMINOPHEN 325 MG/1
650 TABLET ORAL EVERY 6 HOURS PRN
Status: DISCONTINUED | OUTPATIENT
Start: 2020-01-01 | End: 2020-01-01 | Stop reason: HOSPADM

## 2020-01-01 RX ORDER — LORAZEPAM 2 MG/ML
1 INJECTION INTRAMUSCULAR ONCE
Status: COMPLETED | OUTPATIENT
Start: 2020-01-01 | End: 2020-01-01

## 2020-01-01 RX ORDER — ASPIRIN 81 MG/1
81 TABLET ORAL DAILY
Status: DISCONTINUED | OUTPATIENT
Start: 2020-01-01 | End: 2020-01-01 | Stop reason: HOSPADM

## 2020-01-01 RX ORDER — HYDRALAZINE HYDROCHLORIDE 20 MG/ML
10 INJECTION INTRAMUSCULAR; INTRAVENOUS EVERY 6 HOURS PRN
Status: DISCONTINUED | OUTPATIENT
Start: 2020-01-01 | End: 2020-01-01 | Stop reason: DRUGHIGH

## 2020-01-01 RX ORDER — 0.9 % SODIUM CHLORIDE 0.9 %
30 INTRAVENOUS SOLUTION INTRAVENOUS ONCE
Status: COMPLETED | OUTPATIENT
Start: 2020-01-01 | End: 2020-01-01

## 2020-01-01 RX ORDER — FUROSEMIDE 10 MG/ML
40 INJECTION INTRAMUSCULAR; INTRAVENOUS ONCE
Status: COMPLETED | OUTPATIENT
Start: 2020-01-01 | End: 2020-01-01

## 2020-01-01 RX ADMIN — SODIUM CHLORIDE 1000 ML: 9 INJECTION, SOLUTION INTRAVENOUS at 14:15

## 2020-01-01 RX ADMIN — PHENYTOIN SODIUM 100 MG: 100 CAPSULE ORAL at 20:02

## 2020-01-01 RX ADMIN — ENOXAPARIN SODIUM 30 MG: 30 INJECTION SUBCUTANEOUS at 19:38

## 2020-01-01 RX ADMIN — ENOXAPARIN SODIUM 40 MG: 40 INJECTION SUBCUTANEOUS at 08:47

## 2020-01-01 RX ADMIN — SPIRONOLACTONE 25 MG: 25 TABLET ORAL at 08:36

## 2020-01-01 RX ADMIN — BARIUM SULFATE 50 G: 0.81 POWDER, FOR SUSPENSION ORAL at 13:21

## 2020-01-01 RX ADMIN — CEFEPIME HYDROCHLORIDE 2 G: 2 INJECTION, POWDER, FOR SOLUTION INTRAVENOUS at 14:27

## 2020-01-01 RX ADMIN — PIPERACILLIN AND TAZOBACTAM 3.38 G: 3; .375 INJECTION, POWDER, LYOPHILIZED, FOR SOLUTION INTRAVENOUS at 06:21

## 2020-01-01 RX ADMIN — LORAZEPAM 1 MG: 2 INJECTION INTRAMUSCULAR; INTRAVENOUS at 02:46

## 2020-01-01 RX ADMIN — ENOXAPARIN SODIUM 40 MG: 40 INJECTION SUBCUTANEOUS at 08:37

## 2020-01-01 RX ADMIN — ISOSORBIDE MONONITRATE 30 MG: 30 TABLET, EXTENDED RELEASE ORAL at 08:09

## 2020-01-01 RX ADMIN — PIPERACILLIN AND TAZOBACTAM 3.38 G: 3; .375 INJECTION, POWDER, LYOPHILIZED, FOR SOLUTION INTRAVENOUS at 04:17

## 2020-01-01 RX ADMIN — Medication 10 ML: at 08:06

## 2020-01-01 RX ADMIN — AZITHROMYCIN MONOHYDRATE 500 MG: 500 INJECTION, POWDER, LYOPHILIZED, FOR SOLUTION INTRAVENOUS at 14:30

## 2020-01-01 RX ADMIN — ISOSORBIDE MONONITRATE 30 MG: 30 TABLET, EXTENDED RELEASE ORAL at 08:17

## 2020-01-01 RX ADMIN — ISOSORBIDE MONONITRATE 30 MG: 30 TABLET, EXTENDED RELEASE ORAL at 09:13

## 2020-01-01 RX ADMIN — PANTOPRAZOLE SODIUM 40 MG: 40 TABLET, DELAYED RELEASE ORAL at 05:57

## 2020-01-01 RX ADMIN — LEVETIRACETAM 500 MG: 500 TABLET ORAL at 08:09

## 2020-01-01 RX ADMIN — NYSTATIN 500000 UNITS: 500000 SUSPENSION ORAL at 01:47

## 2020-01-01 RX ADMIN — ONDANSETRON 4 MG: 2 INJECTION INTRAMUSCULAR; INTRAVENOUS at 13:44

## 2020-01-01 RX ADMIN — ASPIRIN 81 MG: 81 TABLET, COATED ORAL at 12:28

## 2020-01-01 RX ADMIN — Medication 10 ML: at 09:41

## 2020-01-01 RX ADMIN — ISOSORBIDE MONONITRATE 30 MG: 30 TABLET, EXTENDED RELEASE ORAL at 10:27

## 2020-01-01 RX ADMIN — PIPERACILLIN AND TAZOBACTAM 3.38 G: 3; .375 INJECTION, POWDER, LYOPHILIZED, FOR SOLUTION INTRAVENOUS at 09:01

## 2020-01-01 RX ADMIN — DEXTROSE MONOHYDRATE: 50 INJECTION, SOLUTION INTRAVENOUS at 06:18

## 2020-01-01 RX ADMIN — AZITHROMYCIN MONOHYDRATE 500 MG: 500 INJECTION, POWDER, LYOPHILIZED, FOR SOLUTION INTRAVENOUS at 15:18

## 2020-01-01 RX ADMIN — KETOROLAC TROMETHAMINE 30 MG: 30 INJECTION, SOLUTION INTRAMUSCULAR; INTRAVENOUS at 01:24

## 2020-01-01 RX ADMIN — LEVETIRACETAM 500 MG: 500 TABLET ORAL at 19:58

## 2020-01-01 RX ADMIN — PHENYTOIN SODIUM 100 MG: 100 CAPSULE ORAL at 08:09

## 2020-01-01 RX ADMIN — FUROSEMIDE 40 MG: 10 INJECTION, SOLUTION INTRAMUSCULAR; INTRAVENOUS at 10:44

## 2020-01-01 RX ADMIN — CARVEDILOL 12.5 MG: 12.5 TABLET, FILM COATED ORAL at 08:09

## 2020-01-01 RX ADMIN — CARVEDILOL 12.5 MG: 12.5 TABLET, FILM COATED ORAL at 16:59

## 2020-01-01 RX ADMIN — IPRATROPIUM BROMIDE AND ALBUTEROL SULFATE 1 AMPULE: .5; 3 SOLUTION RESPIRATORY (INHALATION) at 23:03

## 2020-01-01 RX ADMIN — ASPIRIN 81 MG: 81 TABLET, COATED ORAL at 08:46

## 2020-01-01 RX ADMIN — NYSTATIN 500000 UNITS: 500000 SUSPENSION ORAL at 21:41

## 2020-01-01 RX ADMIN — Medication 10 ML: at 08:19

## 2020-01-01 RX ADMIN — PIPERACILLIN AND TAZOBACTAM 3.38 G: 3; .375 INJECTION, POWDER, LYOPHILIZED, FOR SOLUTION INTRAVENOUS at 06:55

## 2020-01-01 RX ADMIN — PIPERACILLIN AND TAZOBACTAM 3.38 G: 3; .375 INJECTION, POWDER, LYOPHILIZED, FOR SOLUTION INTRAVENOUS at 06:12

## 2020-01-01 RX ADMIN — PIPERACILLIN AND TAZOBACTAM 3.38 G: 3; .375 INJECTION, POWDER, LYOPHILIZED, FOR SOLUTION INTRAVENOUS at 18:47

## 2020-01-01 RX ADMIN — CARVEDILOL 12.5 MG: 12.5 TABLET, FILM COATED ORAL at 17:21

## 2020-01-01 RX ADMIN — LEVETIRACETAM 500 MG: 500 TABLET ORAL at 23:37

## 2020-01-01 RX ADMIN — CEFTRIAXONE SODIUM 1 G: 1 INJECTION, POWDER, FOR SOLUTION INTRAMUSCULAR; INTRAVENOUS at 14:56

## 2020-01-01 RX ADMIN — Medication 10 ML: at 20:17

## 2020-01-01 RX ADMIN — CARVEDILOL 12.5 MG: 12.5 TABLET, FILM COATED ORAL at 17:23

## 2020-01-01 RX ADMIN — ASPIRIN 81 MG: 81 TABLET, COATED ORAL at 08:17

## 2020-01-01 RX ADMIN — PIPERACILLIN AND TAZOBACTAM 3.38 G: 3; .375 INJECTION, POWDER, LYOPHILIZED, FOR SOLUTION INTRAVENOUS at 06:39

## 2020-01-01 RX ADMIN — ASPIRIN 81 MG: 81 TABLET, COATED ORAL at 08:36

## 2020-01-01 RX ADMIN — CEFTRIAXONE SODIUM 1 G: 1 INJECTION, POWDER, FOR SOLUTION INTRAMUSCULAR; INTRAVENOUS at 13:37

## 2020-01-01 RX ADMIN — PIPERACILLIN AND TAZOBACTAM 3.38 G: 3; .375 INJECTION, POWDER, LYOPHILIZED, FOR SOLUTION INTRAVENOUS at 14:11

## 2020-01-01 RX ADMIN — NYSTATIN 500000 UNITS: 500000 SUSPENSION ORAL at 18:08

## 2020-01-01 RX ADMIN — HYDRALAZINE HYDROCHLORIDE 10 MG: 20 INJECTION INTRAMUSCULAR; INTRAVENOUS at 21:55

## 2020-01-01 RX ADMIN — SERTRALINE 50 MG: 50 TABLET, FILM COATED ORAL at 10:27

## 2020-01-01 RX ADMIN — PIPERACILLIN AND TAZOBACTAM 3.38 G: 3; .375 INJECTION, POWDER, LYOPHILIZED, FOR SOLUTION INTRAVENOUS at 21:57

## 2020-01-01 RX ADMIN — Medication 10 ML: at 22:24

## 2020-01-01 RX ADMIN — PIPERACILLIN AND TAZOBACTAM 3.38 G: 3; .375 INJECTION, POWDER, LYOPHILIZED, FOR SOLUTION INTRAVENOUS at 22:24

## 2020-01-01 RX ADMIN — PHENYTOIN SODIUM 100 MG: 100 CAPSULE ORAL at 12:28

## 2020-01-01 RX ADMIN — DEXTROSE MONOHYDRATE: 50 INJECTION, SOLUTION INTRAVENOUS at 12:28

## 2020-01-01 RX ADMIN — CEFEPIME HYDROCHLORIDE 2 G: 2 INJECTION, POWDER, FOR SOLUTION INTRAVENOUS at 13:19

## 2020-01-01 RX ADMIN — ENOXAPARIN SODIUM 30 MG: 30 INJECTION SUBCUTANEOUS at 09:35

## 2020-01-01 RX ADMIN — AMOXICILLIN AND CLAVULANATE POTASSIUM 1 TABLET: 875; 125 TABLET, FILM COATED ORAL at 09:13

## 2020-01-01 RX ADMIN — Medication 10 ML: at 09:34

## 2020-01-01 RX ADMIN — FUROSEMIDE 40 MG: 40 TABLET ORAL at 08:47

## 2020-01-01 RX ADMIN — NYSTATIN 500000 UNITS: 500000 SUSPENSION ORAL at 17:27

## 2020-01-01 RX ADMIN — ENOXAPARIN SODIUM 30 MG: 30 INJECTION SUBCUTANEOUS at 10:30

## 2020-01-01 RX ADMIN — PHENYTOIN SODIUM 100 MG: 100 CAPSULE ORAL at 10:27

## 2020-01-01 RX ADMIN — NYSTATIN 500000 UNITS: 500000 SUSPENSION ORAL at 16:00

## 2020-01-01 RX ADMIN — SERTRALINE 50 MG: 50 TABLET, FILM COATED ORAL at 09:13

## 2020-01-01 RX ADMIN — AMLODIPINE BESYLATE 10 MG: 10 TABLET ORAL at 08:35

## 2020-01-01 RX ADMIN — ENOXAPARIN SODIUM 30 MG: 30 INJECTION SUBCUTANEOUS at 08:09

## 2020-01-01 RX ADMIN — PHENYTOIN SODIUM 100 MG: 100 CAPSULE ORAL at 09:33

## 2020-01-01 RX ADMIN — PHENYTOIN SODIUM 100 MG: 100 CAPSULE ORAL at 21:58

## 2020-01-01 RX ADMIN — SERTRALINE 50 MG: 50 TABLET, FILM COATED ORAL at 08:36

## 2020-01-01 RX ADMIN — PHENYTOIN SODIUM 100 MG: 100 CAPSULE ORAL at 23:37

## 2020-01-01 RX ADMIN — Medication 10 ML: at 23:10

## 2020-01-01 RX ADMIN — PIPERACILLIN AND TAZOBACTAM 3.38 G: 3; .375 INJECTION, POWDER, LYOPHILIZED, FOR SOLUTION INTRAVENOUS at 16:00

## 2020-01-01 RX ADMIN — Medication 10 ML: at 19:59

## 2020-01-01 RX ADMIN — SODIUM CHLORIDE 1000 ML: 9 INJECTION, SOLUTION INTRAVENOUS at 13:37

## 2020-01-01 RX ADMIN — CARVEDILOL 12.5 MG: 12.5 TABLET, FILM COATED ORAL at 09:33

## 2020-01-01 RX ADMIN — PHENYTOIN SODIUM 100 MG: 100 CAPSULE ORAL at 08:47

## 2020-01-01 RX ADMIN — ACETAMINOPHEN 650 MG: 325 TABLET ORAL at 09:13

## 2020-01-01 RX ADMIN — SERTRALINE 50 MG: 50 TABLET, FILM COATED ORAL at 12:28

## 2020-01-01 RX ADMIN — SERTRALINE 50 MG: 50 TABLET, FILM COATED ORAL at 13:19

## 2020-01-01 RX ADMIN — PIPERACILLIN AND TAZOBACTAM 3.38 G: 3; .375 INJECTION, POWDER, LYOPHILIZED, FOR SOLUTION INTRAVENOUS at 17:37

## 2020-01-01 RX ADMIN — AZITHROMYCIN MONOHYDRATE 500 MG: 500 INJECTION, POWDER, LYOPHILIZED, FOR SOLUTION INTRAVENOUS at 03:16

## 2020-01-01 RX ADMIN — LEVETIRACETAM 500 MG: 500 TABLET ORAL at 20:51

## 2020-01-01 RX ADMIN — IOPAMIDOL 100 ML: 755 INJECTION, SOLUTION INTRAVENOUS at 00:11

## 2020-01-01 RX ADMIN — CEFTRIAXONE SODIUM 1 G: 1 INJECTION, POWDER, FOR SOLUTION INTRAMUSCULAR; INTRAVENOUS at 14:41

## 2020-01-01 RX ADMIN — PHENYTOIN SODIUM 100 MG: 100 CAPSULE ORAL at 22:24

## 2020-01-01 RX ADMIN — LEVETIRACETAM 500 MG: 500 TABLET ORAL at 09:14

## 2020-01-01 RX ADMIN — SPIRONOLACTONE 25 MG: 25 TABLET ORAL at 08:17

## 2020-01-01 RX ADMIN — CARVEDILOL 12.5 MG: 12.5 TABLET, FILM COATED ORAL at 10:27

## 2020-01-01 RX ADMIN — Medication 10 ML: at 20:33

## 2020-01-01 RX ADMIN — LEVETIRACETAM 500 MG: 500 TABLET ORAL at 10:27

## 2020-01-01 RX ADMIN — CARVEDILOL 12.5 MG: 12.5 TABLET, FILM COATED ORAL at 17:37

## 2020-01-01 RX ADMIN — BARIUM SULFATE 80 ML: 400 SUSPENSION ORAL at 13:21

## 2020-01-01 RX ADMIN — LEVETIRACETAM 500 MG: 500 TABLET ORAL at 08:17

## 2020-01-01 RX ADMIN — LIDOCAINE HYDROCHLORIDE 8 ML: 20 INJECTION, SOLUTION INFILTRATION; PERINEURAL at 11:54

## 2020-01-01 RX ADMIN — ISOSORBIDE MONONITRATE 30 MG: 30 TABLET, EXTENDED RELEASE ORAL at 09:33

## 2020-01-01 RX ADMIN — Medication 10 ML: at 14:30

## 2020-01-01 RX ADMIN — PIPERACILLIN AND TAZOBACTAM 3.38 G: 3; .375 INJECTION, POWDER, LYOPHILIZED, FOR SOLUTION INTRAVENOUS at 23:10

## 2020-01-01 RX ADMIN — SERTRALINE 50 MG: 50 TABLET, FILM COATED ORAL at 08:47

## 2020-01-01 RX ADMIN — Medication 10 ML: at 08:35

## 2020-01-01 RX ADMIN — PIPERACILLIN AND TAZOBACTAM 3.38 G: 3; .375 INJECTION, POWDER, LYOPHILIZED, FOR SOLUTION INTRAVENOUS at 00:25

## 2020-01-01 RX ADMIN — CARVEDILOL 12.5 MG: 12.5 TABLET, FILM COATED ORAL at 08:47

## 2020-01-01 RX ADMIN — Medication 10 ML: at 08:47

## 2020-01-01 RX ADMIN — LEVETIRACETAM 500 MG: 500 TABLET ORAL at 08:36

## 2020-01-01 RX ADMIN — PHENYTOIN SODIUM 100 MG: 100 CAPSULE ORAL at 21:40

## 2020-01-01 RX ADMIN — PIPERACILLIN AND TAZOBACTAM 3.38 G: 3; .375 INJECTION, POWDER, LYOPHILIZED, FOR SOLUTION INTRAVENOUS at 11:47

## 2020-01-01 RX ADMIN — LEVETIRACETAM 500 MG: 500 TABLET ORAL at 21:40

## 2020-01-01 RX ADMIN — ACETAMINOPHEN 650 MG: 325 TABLET ORAL at 14:48

## 2020-01-01 RX ADMIN — CARVEDILOL 12.5 MG: 12.5 TABLET, FILM COATED ORAL at 09:15

## 2020-01-01 RX ADMIN — LEVETIRACETAM 500 MG: 500 TABLET ORAL at 21:58

## 2020-01-01 RX ADMIN — PHENYTOIN SODIUM 100 MG: 100 CAPSULE ORAL at 13:19

## 2020-01-01 RX ADMIN — PIPERACILLIN AND TAZOBACTAM 3.38 G: 3; .375 INJECTION, POWDER, LYOPHILIZED, FOR SOLUTION INTRAVENOUS at 14:30

## 2020-01-01 RX ADMIN — PHENYTOIN SODIUM 100 MG: 100 CAPSULE ORAL at 08:17

## 2020-01-01 RX ADMIN — CARVEDILOL 12.5 MG: 12.5 TABLET, FILM COATED ORAL at 08:17

## 2020-01-01 RX ADMIN — NYSTATIN 500000 UNITS: 500000 SUSPENSION ORAL at 20:57

## 2020-01-01 RX ADMIN — HYDRALAZINE HYDROCHLORIDE 10 MG: 20 INJECTION INTRAMUSCULAR; INTRAVENOUS at 20:57

## 2020-01-01 RX ADMIN — CARVEDILOL 12.5 MG: 12.5 TABLET, FILM COATED ORAL at 17:27

## 2020-01-01 RX ADMIN — ISOSORBIDE MONONITRATE 30 MG: 30 TABLET, EXTENDED RELEASE ORAL at 12:28

## 2020-01-01 RX ADMIN — PHENYTOIN SODIUM 100 MG: 100 CAPSULE ORAL at 20:16

## 2020-01-01 RX ADMIN — LEVETIRACETAM 500 MG: 500 TABLET ORAL at 13:18

## 2020-01-01 RX ADMIN — ISOSORBIDE MONONITRATE 30 MG: 30 TABLET, EXTENDED RELEASE ORAL at 13:19

## 2020-01-01 RX ADMIN — Medication 10 ML: at 08:37

## 2020-01-01 RX ADMIN — NYSTATIN 500000 UNITS: 500000 SUSPENSION ORAL at 20:02

## 2020-01-01 RX ADMIN — PIPERACILLIN AND TAZOBACTAM 3.38 G: 3; .375 INJECTION, POWDER, LYOPHILIZED, FOR SOLUTION INTRAVENOUS at 09:37

## 2020-01-01 RX ADMIN — Medication 10 ML: at 21:59

## 2020-01-01 RX ADMIN — SERTRALINE 50 MG: 50 TABLET, FILM COATED ORAL at 08:17

## 2020-01-01 RX ADMIN — AMLODIPINE BESYLATE 10 MG: 10 TABLET ORAL at 08:18

## 2020-01-01 RX ADMIN — NYSTATIN 500000 UNITS: 500000 SUSPENSION ORAL at 12:45

## 2020-01-01 RX ADMIN — SPIRONOLACTONE 25 MG: 25 TABLET ORAL at 12:28

## 2020-01-01 RX ADMIN — NYSTATIN 500000 UNITS: 500000 SUSPENSION ORAL at 08:18

## 2020-01-01 RX ADMIN — FUROSEMIDE 40 MG: 40 TABLET ORAL at 08:36

## 2020-01-01 RX ADMIN — SODIUM CHLORIDE: 4.5 INJECTION, SOLUTION INTRAVENOUS at 03:16

## 2020-01-01 RX ADMIN — PANTOPRAZOLE SODIUM 40 MG: 40 INJECTION, POWDER, FOR SOLUTION INTRAVENOUS at 09:40

## 2020-01-01 RX ADMIN — AZITHROMYCIN MONOHYDRATE 500 MG: 500 INJECTION, POWDER, LYOPHILIZED, FOR SOLUTION INTRAVENOUS at 15:40

## 2020-01-01 RX ADMIN — CARVEDILOL 12.5 MG: 12.5 TABLET, FILM COATED ORAL at 16:34

## 2020-01-01 RX ADMIN — ENOXAPARIN SODIUM 30 MG: 30 INJECTION SUBCUTANEOUS at 08:05

## 2020-01-01 RX ADMIN — Medication 10 ML: at 20:57

## 2020-01-01 RX ADMIN — ISOSORBIDE MONONITRATE 30 MG: 30 TABLET, EXTENDED RELEASE ORAL at 08:36

## 2020-01-01 RX ADMIN — AZITHROMYCIN DIHYDRATE 500 MG: 500 INJECTION, POWDER, LYOPHILIZED, FOR SOLUTION INTRAVENOUS at 15:04

## 2020-01-01 RX ADMIN — HALOPERIDOL LACTATE 1 MG: 5 INJECTION, SOLUTION INTRAMUSCULAR at 14:23

## 2020-01-01 RX ADMIN — PHENYTOIN SODIUM 100 MG: 100 CAPSULE ORAL at 20:33

## 2020-01-01 RX ADMIN — LEVETIRACETAM 500 MG: 500 TABLET ORAL at 08:46

## 2020-01-01 RX ADMIN — LEVETIRACETAM 500 MG: 500 TABLET ORAL at 20:16

## 2020-01-01 RX ADMIN — SODIUM CHLORIDE 1000 ML: 9 INJECTION, SOLUTION INTRAVENOUS at 18:19

## 2020-01-01 RX ADMIN — PANTOPRAZOLE SODIUM 40 MG: 40 INJECTION, POWDER, FOR SOLUTION INTRAVENOUS at 08:18

## 2020-01-01 RX ADMIN — LEVETIRACETAM 500 MG: 500 TABLET ORAL at 12:28

## 2020-01-01 RX ADMIN — AMLODIPINE BESYLATE 10 MG: 10 TABLET ORAL at 12:28

## 2020-01-01 RX ADMIN — CARVEDILOL 12.5 MG: 12.5 TABLET, FILM COATED ORAL at 08:36

## 2020-01-01 RX ADMIN — LEVETIRACETAM 500 MG: 500 TABLET ORAL at 22:24

## 2020-01-01 RX ADMIN — CARVEDILOL 12.5 MG: 12.5 TABLET, FILM COATED ORAL at 16:00

## 2020-01-01 RX ADMIN — ONDANSETRON 4 MG: 2 INJECTION INTRAMUSCULAR; INTRAVENOUS at 14:15

## 2020-01-01 RX ADMIN — LEVETIRACETAM 500 MG: 500 TABLET ORAL at 20:33

## 2020-01-01 RX ADMIN — SODIUM CHLORIDE: 9 INJECTION, SOLUTION INTRAVENOUS at 19:38

## 2020-01-01 RX ADMIN — NYSTATIN 500000 UNITS: 500000 SUSPENSION ORAL at 21:58

## 2020-01-01 RX ADMIN — PHENYTOIN SODIUM 100 MG: 100 CAPSULE ORAL at 09:14

## 2020-01-01 RX ADMIN — NYSTATIN 500000 UNITS: 500000 SUSPENSION ORAL at 13:56

## 2020-01-01 RX ADMIN — NYSTATIN 500000 UNITS: 500000 SUSPENSION ORAL at 08:46

## 2020-01-01 RX ADMIN — PANTOPRAZOLE SODIUM 40 MG: 40 INJECTION, POWDER, FOR SOLUTION INTRAVENOUS at 08:37

## 2020-01-01 RX ADMIN — ENOXAPARIN SODIUM 30 MG: 30 INJECTION SUBCUTANEOUS at 09:37

## 2020-01-01 RX ADMIN — AMLODIPINE BESYLATE 10 MG: 10 TABLET ORAL at 08:47

## 2020-01-01 RX ADMIN — ENOXAPARIN SODIUM 30 MG: 30 INJECTION SUBCUTANEOUS at 09:14

## 2020-01-01 RX ADMIN — POTASSIUM CHLORIDE 40 MEQ: 1.5 SOLUTION ORAL at 13:56

## 2020-01-01 RX ADMIN — CEFEPIME HYDROCHLORIDE 2 G: 2 INJECTION, POWDER, FOR SOLUTION INTRAVENOUS at 00:46

## 2020-01-01 RX ADMIN — SODIUM CHLORIDE: 9 INJECTION, SOLUTION INTRAVENOUS at 06:14

## 2020-01-01 RX ADMIN — LEVETIRACETAM 500 MG: 500 TABLET ORAL at 09:35

## 2020-01-01 RX ADMIN — CEFEPIME HYDROCHLORIDE 2 G: 2 INJECTION, POWDER, FOR SOLUTION INTRAVENOUS at 23:38

## 2020-01-01 RX ADMIN — AZITHROMYCIN MONOHYDRATE 500 MG: 500 INJECTION, POWDER, LYOPHILIZED, FOR SOLUTION INTRAVENOUS at 15:28

## 2020-01-01 RX ADMIN — PANTOPRAZOLE SODIUM 40 MG: 40 INJECTION, POWDER, FOR SOLUTION INTRAVENOUS at 08:05

## 2020-01-01 RX ADMIN — ISOSORBIDE MONONITRATE 60 MG: 60 TABLET, EXTENDED RELEASE ORAL at 08:46

## 2020-01-01 RX ADMIN — HALOPERIDOL LACTATE 1 MG: 5 INJECTION, SOLUTION INTRAMUSCULAR at 06:11

## 2020-01-01 RX ADMIN — ACETAMINOPHEN 650 MG: 325 TABLET ORAL at 11:46

## 2020-01-01 RX ADMIN — HALOPERIDOL LACTATE 1 MG: 5 INJECTION, SOLUTION INTRAMUSCULAR at 00:34

## 2020-01-01 RX ADMIN — PIPERACILLIN AND TAZOBACTAM 3.38 G: 3; .375 INJECTION, POWDER, LYOPHILIZED, FOR SOLUTION INTRAVENOUS at 01:24

## 2020-01-01 RX ADMIN — FUROSEMIDE 40 MG: 40 TABLET ORAL at 08:17

## 2020-01-01 RX ADMIN — SODIUM CHLORIDE 1974 ML: 9 INJECTION, SOLUTION INTRAVENOUS at 15:52

## 2020-01-01 RX ADMIN — NYSTATIN 500000 UNITS: 500000 SUSPENSION ORAL at 08:37

## 2020-01-01 RX ADMIN — SERTRALINE 50 MG: 50 TABLET, FILM COATED ORAL at 08:09

## 2020-01-01 RX ADMIN — PHENYTOIN SODIUM 100 MG: 100 CAPSULE ORAL at 08:36

## 2020-01-01 RX ADMIN — ENOXAPARIN SODIUM 30 MG: 30 INJECTION SUBCUTANEOUS at 08:18

## 2020-01-01 RX ADMIN — SERTRALINE 50 MG: 50 TABLET, FILM COATED ORAL at 09:33

## 2020-01-01 RX ADMIN — PHENYTOIN SODIUM 100 MG: 100 CAPSULE ORAL at 20:51

## 2020-01-01 RX ADMIN — PIPERACILLIN AND TAZOBACTAM 3.38 G: 3; .375 INJECTION, POWDER, LYOPHILIZED, FOR SOLUTION INTRAVENOUS at 15:30

## 2020-01-01 RX ADMIN — HYDRALAZINE HYDROCHLORIDE 10 MG: 20 INJECTION INTRAMUSCULAR; INTRAVENOUS at 08:05

## 2020-01-01 RX ADMIN — SPIRONOLACTONE 25 MG: 25 TABLET ORAL at 08:47

## 2020-01-01 RX ADMIN — NYSTATIN 500000 UNITS: 500000 SUSPENSION ORAL at 17:46

## 2020-01-01 RX ADMIN — SODIUM CHLORIDE 1000 ML: 9 INJECTION, SOLUTION INTRAVENOUS at 13:44

## 2020-01-01 RX ADMIN — Medication 10 ML: at 21:41

## 2020-01-01 RX ADMIN — SPIRONOLACTONE 25 MG: 25 TABLET ORAL at 17:37

## 2020-01-01 ASSESSMENT — PAIN SCALES - WONG BAKER
WONGBAKER_NUMERICALRESPONSE: 0
WONGBAKER_NUMERICALRESPONSE: 6
WONGBAKER_NUMERICALRESPONSE: 0
WONGBAKER_NUMERICALRESPONSE: 6
WONGBAKER_NUMERICALRESPONSE: 6
WONGBAKER_NUMERICALRESPONSE: 0
WONGBAKER_NUMERICALRESPONSE: 6
WONGBAKER_NUMERICALRESPONSE: 0

## 2020-01-01 ASSESSMENT — ENCOUNTER SYMPTOMS
ABDOMINAL PAIN: 0
COLOR CHANGE: 0
RHINORRHEA: 0
SORE THROAT: 0
SHORTNESS OF BREATH: 0
STRIDOR: 0
SORE THROAT: 0
EYE DISCHARGE: 0
COUGH: 1
DIARRHEA: 0
DIARRHEA: 0
NAUSEA: 1
COUGH: 1
NAUSEA: 0
ABDOMINAL PAIN: 0
RHINORRHEA: 0
WHEEZING: 0
VOMITING: 1
SHORTNESS OF BREATH: 0
VOMITING: 0
CONSTIPATION: 0
VOMITING: 1
NAUSEA: 1
WHEEZING: 0
CHEST TIGHTNESS: 0
ABDOMINAL DISTENTION: 0
SINUS PRESSURE: 0

## 2020-01-01 ASSESSMENT — PAIN DESCRIPTION - ORIENTATION
ORIENTATION: LOWER
ORIENTATION: LOWER

## 2020-01-01 ASSESSMENT — PAIN SCALES - GENERAL
PAINLEVEL_OUTOF10: 0
PAINLEVEL_OUTOF10: 3
PAINLEVEL_OUTOF10: 0
PAINLEVEL_OUTOF10: 6
PAINLEVEL_OUTOF10: 0
PAINLEVEL_OUTOF10: 3
PAINLEVEL_OUTOF10: 3
PAINLEVEL_OUTOF10: 5
PAINLEVEL_OUTOF10: 0
PAINLEVEL_OUTOF10: 3
PAINLEVEL_OUTOF10: 0
PAINLEVEL_OUTOF10: 6

## 2020-01-01 ASSESSMENT — PAIN SCALES - PAIN ASSESSMENT IN ADVANCED DEMENTIA (PAINAD)
NEGVOCALIZATION: 1
BODYLANGUAGE: 1
CONSOLABILITY: 1
TOTALSCORE: 5
FACIALEXPRESSION: 1
BREATHING: 1
TOTALSCORE: 5
FACIALEXPRESSION: 1
NEGVOCALIZATION: 1
CONSOLABILITY: 1
BREATHING: 1
FACIALEXPRESSION: 1
TOTALSCORE: 5
TOTALSCORE: 5
NEGVOCALIZATION: 1
CONSOLABILITY: 1
FACIALEXPRESSION: 1
BODYLANGUAGE: 1
CONSOLABILITY: 1
BODYLANGUAGE: 1
BREATHING: 1
BODYLANGUAGE: 1
BREATHING: 1
NEGVOCALIZATION: 1

## 2020-01-01 ASSESSMENT — PAIN DESCRIPTION - FREQUENCY
FREQUENCY: CONTINUOUS
FREQUENCY: CONTINUOUS

## 2020-01-01 ASSESSMENT — PAIN DESCRIPTION - PAIN TYPE
TYPE: ACUTE PAIN
TYPE: ACUTE PAIN

## 2020-01-01 ASSESSMENT — PAIN DESCRIPTION - LOCATION
LOCATION: ABDOMEN
LOCATION: ABDOMEN

## 2020-02-14 PROBLEM — J18.9 PNEUMONIA: Status: ACTIVE | Noted: 2020-01-01

## 2020-02-14 NOTE — ED TRIAGE NOTES
Pt presents to ED via EMS with c/o emesis and cough x2 weeks. Per EMS, pt's caregiver stated pt has had cough, n/v, and abd pain x2 weeks; caregiver also states that pt multiple pt's at [employer] where pt works have had the [facility] flu\". Upon assessment, pt is alert and oriented to self and situation, skin p/w/d, resp even and unlabored, msp's intact. Pt only admits to abdominal pain at this time. Pt has hx of MRDD and responds to name and yes/no questions.

## 2020-02-14 NOTE — PROGRESS NOTES
Disorder  (acute or chronic)  []   Severe or Chronic w/ Exacerbation  []     Surgical Status No [x]   Surgeries     General []   Surgery Lower []   Abdominal Thoracic or []   Upper Abdominal Thoracic with  PulmonaryDisorder  []     Chest X-ray Clear/Not  Ordered     []  Chronic Changes  Results Pending  []  Infiltrates, atelectasis, pleural effusion, or edema  [x]  Infiltrates in more than one lobe []  Infiltrate + Atelectasis, &/or pleural effusion  []    Respiratory Pattern Regular,  RR = 12-20 [x]  Increased,  RR = 21-25 []  CAMPA, irregular,  or RR = 26-30 []  Decreased FEV1  or RR = 31-35 []  Severe SOB, use  of accessory muscles, or RR ? 35  []    Mental Status Alert, oriented,  Cooperative [x]  Confused but Follows commands []  Lethargic or unable to follow commands []  Obtunded  []  Comatose  []    Breath Sounds Clear to  auscultation  []  Decreased unilaterally or  in bases only [x]  Decreased  bilaterally  []  Crackles or intermittent wheezes []  Wheezes []    Cough Strong, Spontan., & nonproductive [x]  Strong,  spontaneous, &  productive []  Weak,  Nonproductive []  Weak, productive or  with wheezes []  No spontaneous  cough or may require suctioning []    Level of Activity Ambulatory []  Ambulatory w/ Assist  [x]  Non-ambulatory []  Paraplegic []  Quadriplegic []    Total    Score:___5____     Triage Score:___5_____      Tri       Triage:     1. (>20) Freq: Q3    2. (16-20) Freq: Q4   3. (11-15) Freq: QID & Albuterol Q2 PRN    4. (6-10) Freq: TID & Albuterol Q2 PRN    5. (0-5) Freq Q4prn

## 2020-02-14 NOTE — ED PROVIDER NOTES
[address] [facility] ED  eMERGENCY dEPARTMENT eNCOUnter      Pt Name: Ana Navarro  MRN: [id number]  [place] 5/9/1931  Date of evaluation: 2/14/2020  Provider: Denice Main PA-C    CHIEF COMPLAINT       Chief Complaint   Patient presents with    Emesis    Cough         HISTORY OF PRESENT ILLNESS   (Location/Symptom, Timing/Onset,Context/Setting, Quality, Duration, Modifying Factors, Severity)  Note limiting factors. Ana Navarro is a 80 y.o. female who presents to the emergency department with a complaint of cough, nausea vomiting, which group home where patient resides states is been ongoing for the last 2 weeks. She has a past history of MRDD, she presents to the emerge department alone, there is no provider with the patient at this time. Patient cannot contribute to her history, she does not appear in any acute distress. EMS states no episodes of cough, nausea vomiting during transport. HPI    NursingNotes were reviewed. REVIEW OF SYSTEMS    (2-9 systems for level 4, 10 or more for level 5)     Review of Systems   Constitutional: Negative for activity change, appetite change, chills, diaphoresis and fever. HENT: Negative for congestion, ear discharge, ear pain, nosebleeds, rhinorrhea and sore throat. Eyes: Negative for discharge. Respiratory: Positive for cough. Negative for shortness of breath, wheezing and stridor. Cardiovascular: Negative for chest pain, palpitations and leg swelling. Gastrointestinal: Positive for nausea and vomiting. Negative for abdominal distention, abdominal pain, constipation and diarrhea. Genitourinary: Negative for difficulty urinating and dysuria. Musculoskeletal: Negative for arthralgias. Skin: Negative for color change, pallor, rash and wound. Neurological: Negative for dizziness, tremors, syncope, weakness, numbness and headaches. Psychiatric/Behavioral: Negative for agitation and confusion.        Except as noted above the remainder of the review of systems was reviewed and negative. PAST MEDICAL HISTORY     Past Medical History:   Diagnosis Date    [facility] [address]     Chronic CHF ([facility] [address] 5/27/2016    CKD (chronic kidney disease), stage III ([facility] [address] 8/5/2018    Depression     Developmental disability 5/27/2016    Dizzy spells 7/18/2016    HTN (hypertension) 5/27/2016    Hyperlipidemia     Hypertension     Hypokalemia 5/27/2016    Hypoxia 5/27/2016    Mental retardation 5/27/2016    Mental retardation     Seizures (HCC)     SOB (shortness of breath) 7/18/2016    Unstable gait 7/19/2016         SURGICALHISTORY       Past Surgical History:   Procedure Laterality Date    CHOLECYSTECTOMY           CURRENT MEDICATIONS       Previous Medications    AMLODIPINE (NORVASC) 10 MG TABLET    TK 1 T PO D    ASPIRIN 81 MG TABLET    Take 81 mg by mouth daily    ATORVASTATIN (LIPITOR) 10 MG TABLET    Take 10 mg by mouth daily    CARVEDILOL (COREG) 12.5 MG TABLET    Take 1 tablet by mouth 2 times daily (with meals)    CHOLECALCIFEROL (VITAMIN D) 2000 UNITS CAPS CAPSULE    Take 2,000 Units by mouth daily    FUROSEMIDE (LASIX) 40 MG TABLET    Take 1 tablet by mouth daily    ISOSORBIDE MONONITRATE (IMDUR) 30 MG EXTENDED RELEASE TABLET    Take 1 tablet by mouth daily    LEVETIRACETAM (KEPPRA) 500 MG TABLET    Take 1 tablet by mouth 2 times daily    PHENYTOIN (DILANTIN) 100 MG ER CAPSULE    Take 100 mg by mouth 2 times daily     POTASSIUM CHLORIDE (KLOR-CON) 10 MEQ EXTENDED RELEASE TABLET    TK 1 T PO 3 TIMES A WEEK    SERTRALINE (ZOLOFT) 50 MG TABLET    Take 50 mg by mouth daily    SPIRONOLACTONE (ALDACTONE) 25 MG TABLET    Take 1 tablet by mouth daily       ALLERGIES     Patient has no known allergies.     FAMILY HISTORY       Family History   Family history unknown: Yes          SOCIAL HISTORY       Social History     Socioeconomic History    Marital status: Single     Spouse name: None    Number of children: None  Years of education: None    Highest education level: None   Occupational History    None   Social Needs    Financial resource strain: None    Food insecurity:     Worry: None     Inability: None    Transportation needs:     Medical: None     Non-medical: None   Tobacco Use    Smoking status: Never Smoker    Smokeless tobacco: Never Used   Substance and Sexual Activity    Alcohol use: No    Drug use: No    Sexual activity: Never   Lifestyle    Physical activity:     Days per week: None     Minutes per session: None    Stress: None   Relationships    Social connections:     Talks on phone: None     Gets together: None     Attends Anabaptist service: None     Active member of club or organization: None     Attends meetings of clubs or organizations: None     Relationship status: None    Intimate partner violence:     Fear of current or ex partner: None     Emotionally abused: None     Physically abused: None     Forced sexual activity: None   Other Topics Concern    None   Social History Narrative    ** Merged History Encounter **            SCREENINGS      @FLOW([id number])@      PHYSICAL EXAM    (up to 7 for level 4, 8 or more for level 5)     ED Triage Vitals [02/14/20 1301]   BP Temp Temp Source Pulse Resp SpO2 Height Weight   (!) 106/45 98.6 °F (37 °C) Oral 81 18 95 % 5' 3\" (1.6 m) 145 lb (65.8 kg)       Physical Exam  Constitutional:       General: She is not in acute distress. Appearance: She is well-developed. She is not ill-appearing, toxic-appearing or diaphoretic. HENT:      Head: Normocephalic. Right Ear: Tympanic membrane normal.      Left Ear: Tympanic membrane normal.      Nose: Nose normal.      Mouth/Throat:      Mouth: Mucous membranes are moist.   Eyes:      Pupils: Pupils are equal, round, and reactive to light. Neck:      Musculoskeletal: Neck supple. Vascular: No JVD. Trachea: No tracheal deviation. Cardiovascular:      Rate and Rhythm: Normal rate. components:    Clarity, UA CLOUDY (*)     Protein, UA 30 (*)     Leukocyte Esterase, Urine MODERATE (*)     All other components within normal limits   PROCALCITONIN - Abnormal; Notable for the following components:    Procalcitonin 6.10 (*)     All other components within normal limits   MICROSCOPIC URINALYSIS - Abnormal; Notable for the following components:    Bacteria, UA MANY (*)     WBC, UA 20-50 (*)     All other components within normal limits   RAPID INFLUENZA A/B ANTIGENS   CULTURE BLOOD #2   CULTURE BLOOD #1   URINE CULTURE   LACTIC ACID, PLASMA   TROPONIN   CK   BRAIN NATRIURETIC PEPTIDE       All other labs were within normal range or not returned as of this dictation. EMERGENCY DEPARTMENT COURSE and DIFFERENTIAL DIAGNOSIS/MDM:   Vitals:    Vitals:    02/14/20 1301 02/14/20 1429 02/14/20 1530   BP: (!) 106/45 (!) 106/42 (!) 111/51   Pulse: 81 74 75   Resp: 18 20 19   Temp: 98.6 °F (37 °C)     TempSrc: Oral     SpO2: 95% 96% 93%   Weight: 145 lb (65.8 kg)     Height: 5' 3\" (1.6 m)            MDM  Number of Diagnoses or Management Options  JOSHUA (acute kidney injury) ([facility] [address]): Hypoxia:   Lung mass:   Pleural effusion on left:   Pneumonia due to organism:   Urinary tract infection without hematuria, site unspecified:   Diagnosis management comments: Patient presents to the emerge part with complaint of cough shortness of breath nausea vomiting which according to group home is been ongoing for last 2 weeks. Patient is MRDD, and she presented to the emerge department by herself, she does not have a guardian, or staff from the group home where patient resides. Lung sounds are clear, she does not present with a cough, chest x-ray shows findings suspicious for right middle lobe well as left lower lobe infiltrates, and patient has saturations of 85% on room air. .  She does have a white count of 12.6 thousand, lactic acid level is 1.8. troponin is negative at this time, kidney functions are up with a BUN of 39

## 2020-02-14 NOTE — H&P
Hospital Medicine History & Physical      PCP: Fazal Weir MD    Date of Admission: 2/14/2020    Date of Service: 2/14/20      Chief Complaint:  Cough, vomiting      History Of Present Illness:  80 y.o. female who presented to [facility] ED for complaints of a nonproductive cough with nausea and vomiting over the past two weeks. The patient has a history of MRDD and lives at a group home. The staff told EMS the history. The patient is a poor historian. No distress noted. Past Medical History:          Diagnosis Date    CHF (congestive heart failure) (HCC)     Chronic CHF ([facility] 5/27/2016    CKD (chronic kidney disease), stage III ([facility] [address] 8/5/2018    Depression     Developmental disability 5/27/2016    Dizzy spells 7/18/2016    HTN (hypertension) 5/27/2016    Hyperlipidemia     Hypertension     Hypokalemia 5/27/2016    Hypoxia 5/27/2016    Mental retardation 5/27/2016    Mental retardation     Seizures (HCC)     SOB (shortness of breath) 7/18/2016    Unstable gait 7/19/2016       Past Surgical History:          Procedure Laterality Date    CHOLECYSTECTOMY         Medications Prior to Admission:      Prior to Admission medications    Medication Sig Start Date End Date Taking?  Authorizing Provider   amLODIPine (NORVASC) 10 MG tablet TK 1 T PO D 9/22/19   Historical Provider, MD   potassium chloride (KLOR-CON) 10 MEQ extended release tablet TK 1 T PO 3 TIMES A WEEK 9/29/19   Historical Provider, MD   atorvastatin (LIPITOR) 10 MG tablet Take 10 mg by mouth daily    Historical Provider, MD   Cholecalciferol (VITAMIN D) 2000 units CAPS capsule Take 2,000 Units by mouth daily    Historical Provider, MD   levETIRAcetam (KEPPRA) 500 MG tablet Take 1 tablet by mouth 2 times daily 3/26/17   Antonioe Heritage, DO   aspirin 81 MG tablet Take 81 mg by mouth daily    Historical Provider, MD   sertraline (ZOLOFT) 50 MG tablet Take 50 mg by mouth daily    Historical Provider, MD   carvedilol (COREG) 12.5 MG tablet or lesions. Neurologic:  Neurovascularly intact. Psychiatric:  Alert and oriented x's 1-2  Capillary Refill: Brisk,< 3 seconds   Peripheral Pulses: +2 palpable, equal bilaterally       Labs:     Recent Labs     02/14/20  1338   WBC 12.6*   HGB 10.1*   HCT 30.1*        Recent Labs     02/14/20  1338      K 4.2      CO2 19*   BUN 39*   CREATININE 2.00*   CALCIUM 8.6     Recent Labs     02/14/20  1338   AST 12   ALT 8   BILITOT 0.7   ALKPHOS 98     No results for input(s): INR in the last 72 hours. Recent Labs     02/14/20  [address]   TROPONINI <0.010       Urinalysis:      Lab Results   Component Value Date    NITRU Negative 02/14/2020    WBCUA 20-50 02/14/2020    BACTERIA MANY 02/14/2020    RBCUA 0-2 02/14/2020    BLOODU Negative 02/14/2020    SPECGRAV 1.020 02/14/2020    GLUCOSEU Negative 02/14/2020       Radiology:     CXR: I have reviewed the CXR with the following interpretation: pending  EKG:  I have reviewed the EKG with the following interpretation: pending    CT CHEST WO CONTRAST   Final Result   THERE IS AN AREA OF INFILTRATE CONSOLIDATION/MASS EXTENDING FROM THE RIGHT INFRAHILAR REGION OUT TO THE BASE, PLEURA OF THE RIGHT UPPER LOBE. MALIGNANCY IS NOT EXCLUDED. ATELECTASIS, PATCHY TO COALESCENT GLASS AND GROUNDGLASS INFILTRATE RIGHT LOWER LOBE. INFILTRATE, DENSE CONSOLIDATION AT THE BASE LEFT UPPER LOBE LEFT LINGULAR. UNDERLYING MASS NOT EXCLUDED REGION. AREA OF INFILTRATE CONSOLIDATION LEFT LOWER LOBE WITH MODERATE TO LARGE LEFT PLEURAL EFFUSION. OTHER FINDINGS DETAILED ABOVE         All CT scans at this facility use dose modulation, iterative reconstruction, and/or weight based dosing when appropriate to reduce radiation dose to as low as reasonably achievable. XR CHEST STANDARD (2 VW)   Final Result   CONGESTIVE HEART FAILURE.           ASSESSMENT:    Active Hospital Problems    Diagnosis Date Noted    Pneumonia [J18.9] 02/14/2020       PLAN:        DVT

## 2020-02-15 NOTE — PROGRESS NOTES
Pt compliant with oral medications no issues swallowing water or pills  Pt can follow some commands  Afebrile  No emesis or signs of nausea

## 2020-02-15 NOTE — CARE COORDINATION
[facility] CENTER AT [place] Case Management Initial Discharge Assessment    Met with MISSAEL CHANDRA-PATIENT LIVES WITH HER FOR 35 YEARS to discuss discharge plan. PCP: Omega Jensen MD                                Date of Last Visit:     If no PCP, list provided? N/A    Discharge Planning    Living Arrangements: independently at home    Who do you live with? MISSAEL CHANDRA    Who helps you with your care:  private caregiver    If lives at home:     Do you have any barriers navigating in your home? no    Patient can perform ADL? Yes    Current Services (outpatient and in home) :  None    Dialysis: No    Is transportation available to get to your appointments? Yes    DME Equipment: WHEELCHAIR    Respiratory equipment: None    Respiratory provider:  no     Pharmacy:  yes [facility]    Consult with Medication Assistance Program?  No      Patient agreeable to [facility]? Declined    Patient agreeable to SNF/Rehab? Declined    Other discharge needs identified? Other REQUESTING HOSPITAL BED    [place] of choice list provided with basic dialogue that supports the patient's individualized plan of care/goals and shares the quality data associated with the providers. N/A    Does Patient Have a High-Risk for Readmission Diagnosis (CHF, PN, MI, COPD)? Yes    If Yes,     Consult with pulmonologist? Yes   Consult with cardiologist? No   Cardiac Rehab referral if EF <35%? No   Consult with Pharmacy for medication assessment prior to discharge? No   Consult with Behavioral health to aid in depression, anxiety, or coping issues? N/A   Palliative Care Consult? N/A   Pulmonary Rehab order for COPD, PN, and CHF (if EF > 35%)? Declined    Does patient have a reliable scale and know how to read it (for CHF)? No   Nutrition consult for CHF? No   Respiratory therapy consult that includes bedside instruction on administration of nebulizers and/or inhalers, and assessment of oxygen and equipment needs in the home?

## 2020-02-15 NOTE — PROGRESS NOTES
DO   50 mg at 02/15/20 4727       Physical Examination:      Vitals:  BP (!) 118/46   Pulse 85   Temp 99.1 °F (37.3 °C) (Oral)   Resp 18   Ht 5' 3\" (1.6 m)   Wt 145 lb (65.8 kg)   SpO2 90%   BMI 25.69 kg/m²   Temp (24hrs), Av.8 °F (36.6 °C), Min:97.2 °F (36.2 °C), Max:99.1 °F (37.3 °C)      General appearance: no distress, smiling, saying yes/no and hello/bye but does not follow any commands  Lungs: Diminished breath sounds bilaterally, normal effort  Heart: regular rate and rhythm, no murmur  Abdomen: soft, nontender, nondistended, bowel sounds present, no masses  Extremities: no edema, redness, tenderness in the calves  Skin: no gross lesions, rashes    Data:     Labs:  Recent Labs     20  1338 02/15/20  0603   WBC 12.6* 10.9*   HGB 10.1* 10.2*    129*     Recent Labs     20  1338 02/15/20  0603    141   K 4.2 4.1    112*   CO2 19* 17*   BUN 39* 32*   CREATININE 2.00* 1.38*   GLUCOSE 162* 149*     Recent Labs     20  1338   AST 12   ALT 8   BILITOT 0.7   ALKPHOS 98     CT Chest:  THERE IS AN AREA OF INFILTRATE CONSOLIDATION/MASS EXTENDING FROM THE RIGHT INFRAHILAR REGION OUT TO THE BASE, PLEURA OF THE RIGHT UPPER LOBE. MALIGNANCY IS NOT EXCLUDED. ATELECTASIS, PATCHY TO COALESCENT GLASS AND GROUNDGLASS INFILTRATE RIGHT LOWER LOBE. INFILTRATE, DENSE CONSOLIDATION AT THE BASE LEFT UPPER LOBE LEFT LINGULAR. UNDERLYING MASS NOT EXCLUDED REGION.  AREA OF INFILTRATE CONSOLIDATION LEFT LOWER LOBE WITH MODERATE TO LARGE LEFT PLEURAL EFFUSION. Assessment and Plan:        59-year-old female group home resident with mental retardation, seizure disorder, hypertension, CKD 3, h/o sinus pause s/p pacemaker presents from group home for 2 weeks of worsening nausea, dyspnea, and cough. 1. Dyspnea suspect secondary to Community Acquired vs Aspiration Pneumonia and decompensation of diastolic heart failure.  Sepsis ruled out (leukocytosis present, but no fever, tachypnea, tachycardia). - IV Abx and supportive respiratory care. Pulmonology consulted  - echocardiogram pending- will consider diuresis as renal function improves     2. JOSHUA: improved with IVF    3. Possible UTI: on Abx for PNA. Will follow Cx.     4. Dysphagia: appreciate SLP assistance    Seizure Disorder  HTN    Diet: DIET GENERAL; Low Sodium (2 GM);  Dysphagia Minced and Moist  Ppx: lovenox  Full Code    Dispo: return to group home when medically stable    Electronically signed by Michael White DO on 2/15/2020 at 1:42 PM

## 2020-02-15 NOTE — CONSULTS
AREA OF INFILTRATE CONSOLIDATION/MASS EXTENDING FROM THE RIGHT INFRAHILAR REGION OUT TO THE BASE, PLEURA OF THE RIGHT UPPER LOBE. MALIGNANCY IS NOT EXCLUDED. ATELECTASIS, PATCHY TO COALESCENT GLASS AND GROUNDGLASS INFILTRATE RIGHT LOWER LOBE. INFILTRATE, DENSE CONSOLIDATION AT THE BASE LEFT UPPER LOBE LEFT LINGULAR. UNDERLYING MASS NOT EXCLUDED REGION. AREA OF INFILTRATE CONSOLIDATION LEFT LOWER LOBE WITH MODERATE TO LARGE LEFT PLEURAL EFFUSION. OTHER FINDINGS DETAILED ABOVE All CT scans at this facility use dose modulation, iterative reconstruction, and/or weight based dosing when appropriate to reduce radiation dose to as low as reasonably achievable. Assessment, plan:   1. Bilateral multilobar infiltrates, likely aspiration pneumonia but metastatic malignancy cannot be excluded. I would change Rocephin to Zosyn, continue azithromycin  2. Persistent nausea and vomiting of unclear etiology possibly leading to #1, patient's neurologic status limits history  3. Dysphagia, speech therapy evaluation and recommendations were reviewed  4.  Hypoxia associated with bilateral pneumonia  Continue with broad-spectrum antibiotic therapy, swallowing modifications, follow-up chest x-ray tomorrow, if patient is showing improvement would continue treatment and then obtain a follow-up CT in 6 weeks, if no improvement would consider bronchoscopy sooner          Electronically signed by Arnulfo Wayne MD, FCCP on 2/15/2020 at 1:41 PM

## 2020-02-16 NOTE — PROGRESS NOTES
Physician Progress Note    2/16/2020   4:34 PM    Name:  Aayush Dominguez  MRN:    [id number]      Day: 2     Admit Date: 2/14/2020  1:00 PM  PCP: Yelena Gomez MD    Code Status:  Full Code    Subjective:   Pt was seen and examined this morning, was in bed in no acute distress, no issues over night    Current Facility-Administered Medications   Medication Dose Route Frequency Provider Last Rate Last Dose    piperacillin-tazobactam (ZOSYN) 3.375 g in dextrose 5 % 50 mL IVPB extended infusion (mini-bag)  3.375 g Intravenous Q8H Missy Helms MD 12.5 mL/hr at 02/16/20 1530 3.375 g at 02/16/20 1530    sodium chloride flush 0.9 % injection 10 mL  10 mL Intravenous 2 times per day MAGGIE Hernandez   10 mL at 02/15/20 2310    sodium chloride flush 0.9 % injection 10 mL  10 mL Intravenous PRN MAGGIE Hernandez   10 mL at 02/15/20 1430    magnesium hydroxide (MILK OF MAGNESIA) 400 MG/5ML suspension 30 mL  30 mL Oral Daily PRN MAGGIE Hernandez        [facility]ron [facility]) injection 4 mg  4 mg Intravenous Q6H PRN MAGGIE Hernandez        enoxaparin (LOVENOX) injection 30 mg  30 mg Subcutaneous Daily MAGGIE Hernandez   30 mg at 02/16/20 0809    azithromycin (ZITHROMAX) 500 mg in D5W 250ml addavial  500 mg Intravenous Q24H Terrial Lava, [place] 250 mL/hr at 02/16/20 1528 500 mg at 02/16/20 1528    acetaminophen (TYLENOL) tablet 650 mg  650 mg Oral Q4H PRN Terrial Lava, [place]        ipratropium-albuterol (DUONEB) nebulizer solution 1 ampule  1 ampule Inhalation Q4H PRN Raynaldo Daily, DO        carvedilol (COREG) tablet 12.5 mg  12.5 mg Oral BID WC Raynaldo Daily, DO   12.5 mg at 02/16/20 0809    isosorbide mononitrate (IMDUR) extended release tablet 30 mg  30 mg Oral Daily Raynaldo Daily, DO   30 mg at 02/16/20 0809    levETIRAcetam (KEPPRA) tablet 500 mg  500 mg Oral BID Raynaldo Daily, DO   500 mg at 02/16/20 0809    phenytoin (DILANTIN) ER capsule 100

## 2020-02-16 NOTE — PROGRESS NOTES
INPATIENT PROGRESS NOTES    PATIENT NAME: Mathew Ashraf  MRN: [id number]  SERVICE DATE:  February 16, 2020   SERVICE TIME:  1:04 PM      PRIMARY SERVICE: Pulmonary Disease    CHIEF COMPLAINTS: Lethargic    INTERVAL HPI: Patient seen and examined at bedside, Interval Notes, orders reviewed. Nursing notes noted  She was lethargic but arousable to voice, not as responsive to questions today, appears comfortable though at rest on nasal cannula, oxygenating well, afebrile with stable hemodynamic status. Chest x-ray showed evidence of mild increase in her left pleural effusion possibly associated with left lower lobe consolidation causing increased opacity. OBJECTIVE    Body mass index is 25.69 kg/m². PHYSICAL EXAM:  Vitals:  BP (!) 148/39   Pulse 83   Temp 98.1 °F (36.7 °C) (Oral)   Resp 18   Ht 5' 3\" (1.6 m)   Wt 145 lb (65.8 kg)   SpO2 96%   BMI 25.69 kg/m²   General: Patient is alert, awake . comfortable in bed, No distress. Head: Atraumatic , Normocephalic   Eyes: PERRL. No icteric sclera. No conjunctival injection. No discharge   ENT: No nasal  discharge. Pharynx clear. Neck:  Trachea midline. No thyromegaly, no JVD, No cervical adenopathy. Chest : Adequate effort, symmetric bilateral excursions  Lung : Diminished breath sounds bilaterally, scattered faint rhonchi, more diminished over the left base  Heart[de-identified] Regular rhythm and rate. No mumur ,  Rub or gallop  ABD: Benign. Non-tender. Non-distended. No masses or organmegaly. Normal bowel sounds. EXT: Trace pitting edema both lower extremities , No Cyanosis No clubbing  Neuro: no focal weakness  Skin: Warm and dry. No erythema or rash on exposed extremities.       DATA:   Recent Labs     02/15/20  0603 02/16/20  0626   WBC 10.9* 10.5   HGB 10.2* 9.6*   HCT 30.2* 28.6*   MCV 92.8 91.7   * 147     Recent Labs     02/14/20  1338 02/15/20  0603 02/16/20  0626    141 144   K 4.2 4.1 4.1    112* 114*   CO2 19* 17* 16*   BUN 39* 32* unchanged. Osteopenia. Low lung volumes. Cardiac size normal. Pulmonary vasculature is distinct. Ill-defined area increased opacity right upper lobe marginated by the minor fissure, with ill-defined area increased opacity also found in right lower lung. Right diaphragm elevated. Lung shows blunting costophrenic angle with ill-defined areas increase opacity left midlung zone and left lower lung. CONGESTIVE HEART FAILURE. Ct Chest Wo Contrast    Result Date: 2/14/2020  EXAMINATION:  CT SCAN CHEST CLINICAL HISTORY:  Abnormal chest x-ray COMPARISON:  None TECHNIQUE:  Multiple serial axial images of the chest from the base neck through the upper abdomen with both sagittal coronal reconstruction was performed without intravenous or oral administration of contrast. FINDINGS:  There is an area of infiltrate consolidation/mass extending from the right infrahilar region out to the base, pleura of the right upper lobe There is an area of atelectasis, patchy to glass and groundglass infiltrate right lower lobe. There is an area of infiltrate, dense consolidation at the base left upper lobe left lingular region. Underlying mass not excluded. There is an area of infiltrate consolidation left lower lobe with air bronchograms with moderate to large left pleural effusion. No significant periaortic. Left perihilar adenopathy. Adenopathy. There is subcarinal adenopathy. Trace pericardial effusion. In the field-of-view of the abdomen there is a moderate hiatal hernia. There is a partially visualized cystic area on the right. Exophytic cyst at the superior pole the right kidney is seen by CT scan January 11, 2019. There is multilevel degenerative changes supraspinatus upon a moderate dorsal kyphosis. There is a old compression fracture of T12 again noted. THERE IS AN AREA OF INFILTRATE CONSOLIDATION/MASS EXTENDING FROM THE RIGHT INFRAHILAR REGION OUT TO THE BASE, PLEURA OF THE RIGHT UPPER LOBE. MALIGNANCY IS NOT EXCLUDED.

## 2020-02-16 NOTE — PROGRESS NOTES
Pt resting in bed no distress. Pt transferred to an air mattress for skin protection. meds given per orders. Pt turned and repositioned q 2 hours. Fall precautions in place. Call light in reach.

## 2020-02-16 NOTE — PROGRESS NOTES
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    History of recurrent UTI (urinary tract infection) 2018    Elevated phenytoin level 2018    Aortic regurgitation 2018    CKD (chronic kidney disease), stage III ([address] 2018    Atelectasis, bilateral 2018    Observation for suspected concussion 2018    HB (heart block) 2018    Concussion syndrome 2018    Numbness and tingling of left leg 2017    Mental retardation 2017    Seizure disorder ([address] 2017    Essential hypertension 2017    Unstable gait 2016    Dizzy spells 2016    SOB (shortness of breath) 2016    Chronic CHF ([address] 2016    Mental retardation 2016    Hypoxia 2016    Developmental disability 2016    HTN (hypertension) 2016    Hypokalemia 2016    Generalized seizures ([address] 2016        Past Medical History:   Diagnosis Date    CHF (congestive heart failure) ([address]     Chronic CHF ([address] 2016    CKD (chronic kidney disease), stage III ([address] 2018    Depression     Developmental disability 2016    Dizzy spells 2016    HTN (hypertension) 2016    Hyperlipidemia     Hypertension     Hypokalemia 2016    Hypoxia 2016    Mental retardation 2016    Mental retardation     Seizures ([address]     SOB (shortness of breath) 2016    Unstable gait 2016     Past Surgical History:   Procedure Laterality Date    CHOLECYSTECTOMY          Restrictions  Restrictions/Precautions: Fall Risk     Safety Devices: Safety Devices  Safety Devices in place: Yes  Type of devices: All fall risk precautions in place    Subjective \"I have to go to the bathroom\"        Pain Reassessment:   Pain Assessment  Patient Currently in Pain: Other (comment)(unable to verbalize )       Prior Level of Function:  Social/Functional History  Lives With: Other (comment)(caregivers of 30 years)  Type of Home: House  Home Layout: One level  Home Access: Level entry  Bathroom Shower/Tub: Walk-in shower  Bathroom Equipment: Shower chair, Grab bars in shower  Home Equipment: [address]: Needs assistance  Homemaking Assistance: Needs assistance  Homemaking Responsibilities: No  Ambulation Assistance: Independent(non ambulatory)  Transfer Assistance: Needs assistance(SBA)  Active : No  Additional Comments: pt has 24 hr caregivers who provide all ADL care. Pt at baseline is dependent for w/c mobility but is able to stand/transfer with SBA and is aware of self and day of the week at baseline. OBJECTIVE: OT eval completed in room with nursing present for assistance with transfer to [facility]     Orientation Status:  Orientation  Overall Orientation Status: Impaired  Orientation Level: Oriented to person    Observation:  Observation/Palpation  Posture: Fair  Observation: difficulty engaing in conversation and answering yes/no questions, poor direction following with transfr to [facility]    Cognition Status:  Cognition  Overall Cognitive Status: Exceptions  Arousal/Alertness: Inconsistent responses to stimuli  Following Commands:  Follows one step commands with repetition  Attention Span: Difficulty attending to directions, Unable to maintain attention  Memory: Decreased short term memory, Decreased long term memory  Problem Solving: Assistance required to generate solutions, Assistance required to implement solutions, Other(from bed to [facility] )  Assist Level: Maximum assistance(x 2 person assist )  Functional Mobility Comments: 2 person assist need for pivot transfer to [facility]   Transfers  Stand Step Transfers: 2 Person assistance, Maximum assistance  Stand Pivot Transfers: 2 Person assistance, Maximum assistance  Sit to stand: Moderate assistance, 2 Person assistance  Stand to sit: Maximum assistance, 2 Person assistance  Transfer Comments: decreased strength, balance, and ability to follow directions     Bed Mobility  Bed mobility  Rolling to Left: Moderate assistance  Rolling to Right: Moderate assistance  Supine to Sit: Moderate assistance  Sit to Supine: Moderate assistance, 2 Person assistance  Scooting: Maximal assistance, 2 Person assistance    Seated and Standing Balance:  Balance  Sitting Balance: Contact guard assistance  Standing Balance: Maximum assistance(2 person assist )    Functional Endurance:  Activity Tolerance  Activity Tolerance: Treatment limited secondary to decreased cognition, Patient Tolerated treatment well    D/C Recommendations:  OT D/C RECOMMENDATIONS  REQUIRES OT FOLLOW UP: Yes    OT Follow Up:  OT D/C RECOMMENDATIONS  REQUIRES OT FOLLOW UP: Yes       Assessment/Discharge Disposition:  Assessment: Pt is an 80year old female, recently admitted to [facility] d/t pneumonia. Pt has hx of MRDD and has 24 hour care at home with ADLs, IADLs, and functional transfers. Pt uses a wheel chair for mobility. Pt required 2 person assist for transfer from bed to [facility] and 3 person assist for toileting. Pt presents with the above deficits and would benefit from skilled OT intervention for increased stregnth, balance, and endurance for functional transfers.    Performance deficits / Impairments: Decreased functional mobility , Decreased safe awareness, Decreased endurance, Decreased balance, Decreased high-level IADLs, Decreased coordination, Decreased ADL status, Decreased strength, Decreased cognition, Decreased fine motor control  Prognosis: Fair  Discharge Recommendations: Continue to assess pending progress, 24 hour supervision or assist  Decision Making: High Complexity  History: multiple comorbidities   Exam: 10 deficits   Assistance / Modification: MAX x2     Six Click Score   How much help for putting on and taking off regular lower body clothing?: Total  How much help for Bathing?: Total  How much help for Toileting?: Total  How much help for putting on and taking off regular upper body clothing?: Total  How much help for taking care of personal grooming?: A Little  How much help for eating meals?: A Little  AM-[facility] Inpatient Daily Activity Raw Score: 10  AM-PAC Inpatient ADL T-Scale Score : 27.31  ADL Inpatient CMS 0-100% Score: 74.7    Plan:  Plan  Times per week: 1-3x/week   Times per day: Daily  Plan weeks: duration of stay   Current Treatment Recommendations: Strengthening, Balance Training, Endurance Training, Patient/Caregiver Education & Training, Home Management Training, Functional Mobility Training, Wheelchair Mobility Training, Safety Education & Training, Self-Care / ADL    Goals:   Patient will:    - Improve functional endurance to tolerate/complete 60 mins of ADL's  - Be MIN A  in UB ADLs   - Be MOD A  in LB ADLs  - Be MIN A  in ADL transfers without LOB  - Be MOD A  in toileting tasks    Patient Goal:    Unable to state a goal     Discussed and agreed upon: No Comments: unable to state a goal d/t cognition     Therapy Time:   OT Individual Minutes  Time In: 1515  Time Out: 1535  Minutes: 20    Eval: 20 minutes     Electronically signed by:    CHRISTOPHER Ramos  2/16/2020, 3:59 PM

## 2020-02-17 NOTE — PROGRESS NOTES
pain.    Patient/Caregiver Education:  No education provided at this time. Safety Devices: All fall risk precautions in place      Therapy Time   Time in: 1140  Time out: 1155  Minutes: 15            Signature: Electronically signed by Moiz Hawk.  Tracie Raya, SLP on 2/17/2020 at 12:00 PM

## 2020-02-17 NOTE — CONSULTS
HPI:  Patient is a pleasant 63-year-old woman who was admitted from the emergency room secondary to nonproductive cough with nausea and vomiting for the past 2 weeks. Patient has history of MRDD and lives in a group home. History was obtained from the medical chart as patient is a poor historian and does not verbalize very well. CT scan demonstrated bilateral infiltrates in the left-sided pleural effusion. Interventional radiology was consulted for a left-sided diagnostic and therapeutic ultrasound-guided thoracentesis.      Family History   Family history unknown: Yes       Past Surgical History:   Procedure Laterality Date    CHOLECYSTECTOMY          Past Medical History:   Diagnosis Date    [facility] failure) [facility]     Chronic CHF ([address] 5/27/2016    CKD (chronic kidney disease), stage III ([address] 8/5/2018    Depression     Developmental disability 5/27/2016    Dizzy spells 7/18/2016    HTN (hypertension) 5/27/2016    Hyperlipidemia     Hypertension     Hypokalemia 5/27/2016    Hypoxia 5/27/2016    Mental retardation 5/27/2016    Mental retardation     Seizures ([address]     SOB (shortness of breath) 7/18/2016    Unstable gait 7/19/2016       Social History     Socioeconomic History    Marital status: Single     Spouse name: None    Number of children: None    Years of education: None    Highest education level: None   Occupational History    None   Social Needs    Financial resource strain: None    Food insecurity:     Worry: None     Inability: None    Transportation needs:     Medical: None     Non-medical: None   Tobacco Use    Smoking status: Never Smoker    Smokeless tobacco: Never Used   Substance and Sexual Activity    Alcohol use: No    Drug use: No    Sexual activity: Never   Lifestyle    Physical activity:     Days per week: None     Minutes per session: None    Stress: None   Relationships    Social connections:     Talks on phone: None     Gets together: None Attends Yazidism service: None     Active member of club or organization: None     Attends meetings of clubs or organizations: None     Relationship status: None    Intimate partner violence:     Fear of current or ex partner: None     Emotionally abused: None     Physically abused: None     Forced sexual activity: None   Other Topics Concern    None   Social History Narrative    ** Merged History Encounter **            No Known Allergies    No current facility-administered medications on file prior to encounter.       Current Outpatient Medications on File Prior to Encounter   Medication Sig Dispense Refill    amLODIPine (NORVASC) 10 MG tablet TK 1 T PO D  1    Cholecalciferol (VITAMIN D) 2000 units CAPS capsule Take 2,000 Units by mouth daily      levETIRAcetam (KEPPRA) 500 MG tablet Take 1 tablet by mouth 2 times daily 60 tablet 0    aspirin 81 MG tablet Take 81 mg by mouth daily      sertraline (ZOLOFT) 50 MG tablet Take 50 mg by mouth daily      carvedilol (COREG) 12.5 MG tablet Take 1 tablet by mouth 2 times daily (with meals) 180 tablet 3    furosemide (LASIX) 40 MG tablet Take 1 tablet by mouth daily (Patient taking differently: Take 40 mg by mouth every other day ) 90 tablet 3    isosorbide mononitrate (IMDUR) 30 MG extended release tablet Take 1 tablet by mouth daily 90 tablet 3    spironolactone (ALDACTONE) 25 MG tablet Take 1 tablet by mouth daily (Patient taking differently: Take 25 mg by mouth 2 times daily ) 90 tablet 3    phenytoin (DILANTIN) 100 MG ER capsule Take 100 mg by mouth 2 times daily       potassium chloride (KLOR-CON) 10 MEQ extended release tablet TK 1 T PO 3 TIMES A WEEK  1    atorvastatin (LIPITOR) 10 MG tablet Take 10 mg by mouth daily         Review of Systems   Unable to perform ROS: Patient nonverbal       OBJECTIVE:  BP (!) 167/49   Pulse 73   Temp 97.5 °F (36.4 °C) (Oral)   Resp 24   Ht 5' 3\" (1.6 m)   Wt 145 lb (65.8 kg)   SpO2 95%   BMI 25.69 kg/m² Physical Exam  Constitutional:       General: She is not in acute distress. Appearance: She is well-developed. She is not diaphoretic. HENT:      Head: Normocephalic and atraumatic. Nose: Nose normal.      Mouth/Throat:      Pharynx: No oropharyngeal exudate. Eyes:      General: No scleral icterus. Right eye: No discharge. Left eye: No discharge. Conjunctiva/sclera: Conjunctivae normal.   Neck:      Musculoskeletal: Neck supple. Thyroid: No thyromegaly. Vascular: No JVD. Trachea: No tracheal deviation. Cardiovascular:      Rate and Rhythm: Normal rate and regular rhythm. Heart sounds: Normal heart sounds. No murmur. No friction rub. No gallop. Pulmonary:      Effort: No respiratory distress. Breath sounds: No stridor. Examination of the left-middle field reveals decreased breath sounds. Examination of the left-lower field reveals decreased breath sounds. Decreased breath sounds and rhonchi present. No wheezing or rales. Chest:      Chest wall: No tenderness. Abdominal:      General: Bowel sounds are normal. There is no distension. Palpations: Abdomen is soft. There is no mass. Tenderness: There is no abdominal tenderness. There is no guarding or rebound. Hernia: No hernia is present. Musculoskeletal:         General: No tenderness or deformity. Skin:     General: Skin is dry. Coloration: Skin is not pale. Findings: No erythema or rash. Neurological:      Mental Status: She is lethargic.          ASSESSMENT ANDPLAN:      ASSESSMENT: Left-sided pleural effusion with bilateral infiltrates    PLAN: Ultrasound-guided diagnostic and therapeutic left thoracentesis

## 2020-02-17 NOTE — PROGRESS NOTES
[facility]   Facility/Department: [address] [facility]  Speech Language Pathology    [facility]  5/9/1931  [id number]    Date: 2/17/2020      Speech Therapy attempted to see Mathew Lopezkwesi on this date for a/an:    Treatment    Pt was unable to be seen due to:   Patient is currently off unit. SLP attempted x2. Patient off unit and then receiving testing in the room. SLP to re-attempt at a later time.          Electronically signed by NIKO Romero on 2/17/20 at 10:25 AM

## 2020-02-17 NOTE — PROCEDURES
Unstable gait 7/19/2016     Past Surgical History:   Procedure Laterality Date    CHOLECYSTECTOMY       No Known Allergies    Date of Onset: 2/14/2020      Date of Evaluation: 2/17/2020   Evaluating Therapist: NIKO Angeles      SUMMARY OF EVALUATION  DYSPHAGIA DIAGNOSIS:  Mild-moderate oral dysphagia; Mild pharyngeal dysphagia    DIET RECOMMENDATIONS: Minced & Moist with Thin liquids      FEEDING RECOMMENDATIONS:   Full assistance required  Supervision with all PO intake  Feed in upright position  Small bites/sips  Eat/Feed at a slow rate  Ensure patient has swallowed prior to offering another bite/sip      THERAPY RECOMMENDATIONS:   Therapy is recommended to:  Assess tolerance of recommended diet      Nursing notified: Avelina MULLEN      OBJECTIVE ASSESSMENT    Oral mechanism examination:  Patient unable to complete oral motor exam    Dentition:  Missing some teeth    Current respiratory status:      Room air    Baseline Vocal Quality:  Pt is primarily non-verbal with production of moans and occasional true words. Voice is dysphonic. Decreased speech intelligibility. Cognitive status:   Alert  Cognitive Deficits      Diet Level Prior to this Evaluation:    Dysphagia Minced and Moist and Mildly Thick (Nectar) Liquids        PROCEDURE   Patient Positioning: Seated 70-90°  Viewing Plane(s): LATERAL ONLY  Contrast: commercially prepared, non-standardized barium viscosities; ranging from thin liquid to pudding consistency  Consistencies Administered During the Evaluation:   Puree (pudding)  Soft Solid (mixed fruit)  Solid (cookie)  Thin liquid  Nectar liquid      Method of Intake:   Fed by Clinician  Spoon  Straw    RESULTS     ORAL PHASE:  Poor mastication: Soft solid and Solid  Right pocketing in lateral sulci: Solid  Decreased bolus cohesion: Thin and Nectar  Lingual/palatal residue:  Thin and Nectar  Premature bolus loss to pharynx: All       Oral Stage Impression: Mild-moderate oral dysphagia Short Term Goals:   Pt to be seen  1-2x/week  1. Pt will tolerate the recommended diet level with no s/s of aspiration. 2. Educate family/staff/caregivers safe swallow strategies during feeding. Prognosis for improvements is: Good,  family support/communication      PAIN:  Patient demonstrates no s/s of pain. Radiologist:  Available on Consult as needed, Radiology Tech present    Treatment goals were discussed with the:   No education provided at this time. Patient demonstrates no evidence of learning. Thank you for your referral.  Please direct any questions or concerns to Speech Pathology. Images are available through [facility]/PACS. Please see radiologist report for additional details. Therapy Time  SLP Individual Minutes  Time In: 1310  Time Out: 1325  Minutes: 15          Signature:  Electronically signed by NIKO Dudley on 2/17/2020 at 2:20 PM

## 2020-02-17 NOTE — PROGRESS NOTES
LABALBU 2.9*  --   --   --    BILITOT 0.7  --   --   --    ALKPHOS 98  --   --   --    AST 12  --   --   --    ALT 8  --   --   --    LABGLOM 23.5*   < > 41.1* 46.8*   GFRAA 28.4*   < > 49.8* 56.6*   GLOB 3.6*  --   --   --     < > = values in this interval not displayed. MV Settings:          No results for input(s): PHART, TCA1VON, PO2ART, HPU0LXE, BEART, Z9EJHNHS in the last 72 hours. O2 Device: Nasal cannula  O2 Flow Rate (L/min): 1.75 L/min    DIET GENERAL; Low Sodium (2 GM); Dysphagia Minced and Moist     MEDICATIONS during current hospitalization:    Continuous Infusions:    Scheduled Meds:   cefepime  2 g Intravenous Q12H    sodium chloride flush  10 mL Intravenous 2 times per day    enoxaparin  30 mg Subcutaneous Daily    azithromycin  500 mg Intravenous Q24H    carvedilol  12.5 mg Oral BID WC    isosorbide mononitrate  30 mg Oral Daily    levETIRAcetam  500 mg Oral BID    phenytoin  100 mg Oral BID    sertraline  50 mg Oral Daily       PRN Meds:sodium chloride flush, magnesium hydroxide, ondansetron, acetaminophen, ipratropium-albuterol    Radiology  Xr Chest Standard (2 Vw)    Result Date: 2/17/2020  EXAMINATION: CHEST X-RAY, AP VIEW CLINICAL HISTORY: FOLLOW-UP PULMONARY INFILTRATES AND PLEURAL EFFUSIONS, DYSPNEA COMPARISONS: 2/16/2020 FINDINGS: There is a bipolar cardiac pacemaker superimposed on the upper left hemithorax with electrodes in the region of the RA and RV. There is cardiomegaly. There is atherosclerotic calcification and tortuosity of the aorta. No significant clearing of  the bilateral pulmonary infiltrates and bilateral pleural effusions since yesterday's chest x-ray. Osteopenia and degenerative bone spurring in the spine is once again noted. NO SIGNIFICANT CLEARING OF THE BILATERAL PULMONARY INFILTRATES AND BILATERAL PLEURAL EFFUSIONS SINCE YESTERDAY'S CHEST X-RAY.     Xr Chest Standard (2 Vw)    Result Date: 2/16/2020  EXAMINATION: Chest x-ray, 2 view HISTORY: Pneumonia TECHNIQUE: Frontal and lateral views of the chest COMPARISON: CT chest from February 14, 2020 FINDINGS: Left-sided pacemaker is present. Atherosclerotic calcification of the thoracic aorta. Left heart border is obscured by moderate left pleural effusion and left lung base consolidations. Right upper lobe consolidation is not significantly changed. Pneumothorax. No acute osseous abnormality. No significant interval change of right upper lobe consolidation and moderate left pleural effusion and left lung base consolidations. Xr Chest Standard (2 Vw)    Result Date: 2/14/2020  EXAMINATION: XR CHEST (2 VW) CLINICAL HISTORY: COUGH, NAUSEA, VOMITING FOR 2 WEEKS COMPARISONS: AUGUST 9 2016 FINDINGS: Pacemaker generator and wires unchanged. Osteopenia. Low lung volumes. Cardiac size normal. Pulmonary vasculature is distinct. Ill-defined area increased opacity right upper lobe marginated by the minor fissure, with ill-defined area increased opacity also found in right lower lung. Right diaphragm elevated. Lung shows blunting costophrenic angle with ill-defined areas increase opacity left midlung zone and left lower lung. CONGESTIVE HEART FAILURE. Ct Chest Wo Contrast    Result Date: 2/14/2020  EXAMINATION:  CT SCAN CHEST CLINICAL HISTORY:  Abnormal chest x-ray COMPARISON:  None TECHNIQUE:  Multiple serial axial images of the chest from the base neck through the upper abdomen with both sagittal coronal reconstruction was performed without intravenous or oral administration of contrast. FINDINGS:  There is an area of infiltrate consolidation/mass extending from the right infrahilar region out to the base, pleura of the right upper lobe There is an area of atelectasis, patchy to glass and groundglass infiltrate right lower lobe. There is an area of infiltrate, dense consolidation at the base left upper lobe left lingular region. Underlying mass not excluded.  There is an area of infiltrate consolidation

## 2020-02-17 NOTE — PROGRESS NOTES
Per Perry Osborne, for consent form on thoracentesis, no signature needed.  Write on consent from, once doctor writes progress note, \"see progress note \"     Electronically signed by Rosa Elena Rubio RN on 2/17/2020 at 5:20 PM

## 2020-02-17 NOTE — PROGRESS NOTES
Physician Progress Note    2/17/2020   2:14 PM    Name:  Eloy Quispe  MRN:    [id number]     [address] [facility] Day: 3     Admit Date: 2/14/2020  1:00 PM  PCP: Germán Watson MD    Code Status:  Full Code    Subjective:   Pt was seen and examined this morning, was in bed in no acute distress, no issues over night    Current Facility-Administered Medications   Medication Dose Route Frequency Provider Last Rate Last Dose    cefepime (MAXIPIME) 2 g IVPB minibag  2 g Intravenous Q12H Ricky Chao MD        sodium chloride flush 0.9 % injection 10 mL  10 mL Intravenous 2 times per day MAGGIE Claudio   10 mL at 02/16/20 2224    sodium chloride flush 0.9 % injection 10 mL  10 mL Intravenous PRN MAGGIE Abad   10 mL at 02/15/20 1430    magnesium hydroxide (MILK OF MAGNESIA) 400 MG/5ML suspension 30 mL  30 mL Oral Daily PRN MAGGIE Claudio        ondansetron [facility]) injection 4 mg  4 mg Intravenous Q6H PRN MAGGIE Abad        enoxaparin (LOVENOX) injection 30 mg  30 mg Subcutaneous Daily MAGGIE Claudio   30 mg at 02/17/20 1030    azithromycin (ZITHROMAX) 500 mg in D5W 250ml addavial  500 mg Intravenous Q24H Pat Patrick [address]   Stopped at 02/16/20 1658    acetaminophen (TYLENOL) tablet 650 mg  650 mg Oral Q4H PRN Pat Patrick [address]        ipratropium-albuterol (DUONEB) nebulizer solution 1 ampule  1 ampule Inhalation Q4H PRN Qing Mckeon DO        carvedilol (COREG) tablet 12.5 mg  12.5 mg Oral BID  Qingkriss Mckeon DO   12.5 mg at 02/17/20 1027    isosorbide mononitrate (IMDUR) extended release tablet 30 mg  30 mg Oral Daily Qingdaniel Mckeon, DO   30 mg at 02/17/20 1027    levETIRAcetam (KEPPRA) tablet 500 mg  500 mg Oral BID Qingkriss Mckeon DO   500 mg at 02/17/20 1027    phenytoin (DILANTIN) ER capsule 100 mg  100 mg Oral BID Qing Mckeon DO   100 mg at 02/17/20 1027    sertraline (ZOLOFT) tablet 50 mg  50 mg Oral Daily Sky Klein Phil, DO   50 mg at 20 1027       Physical Examination:      Vitals:  BP (!) 167/49   Pulse 73   Temp 97.5 °F (36.4 °C) (Oral)   Resp 24   Ht 5' 3\" (1.6 m)   Wt 145 lb (65.8 kg)   SpO2 95%   BMI 25.69 kg/m²   Temp (24hrs), Av.5 °F (36.4 °C), Min:97.5 °F (36.4 °C), Max:97.5 °F (36.4 °C)      General appearance: no distress, smiling, saying yes/no and hello/bye but does not follow any commands  Lungs: Diminished breath sounds on the left up to mid chest.  normal effort  Heart: regular rate and rhythm, no murmur  Abdomen: soft, nontender, nondistended, bowel sounds present, no masses  Extremities: no edema, redness, tenderness in the calves  Skin: no gross lesions, rashes    Data:     Labs:  Recent Labs     20  0626 20  0644   WBC 10.5 6.9   HGB 9.6* 10.1*    174     Recent Labs     20  0626 20  0644    146*   K 4.1 4.1   * 113*   CO2 16* 18*   BUN 34* 32*   CREATININE 1.23* 1.10*   GLUCOSE 153* 137*     No results for input(s): AST, ALT, ALB, BILITOT, ALKPHOS in the last 72 hours. CT Chest:  THERE IS AN AREA OF INFILTRATE CONSOLIDATION/MASS EXTENDING FROM THE RIGHT INFRAHILAR REGION OUT TO THE BASE, PLEURA OF THE RIGHT UPPER LOBE. MALIGNANCY IS NOT EXCLUDED. ATELECTASIS, PATCHY TO COALESCENT GLASS AND GROUNDGLASS INFILTRATE RIGHT LOWER LOBE. INFILTRATE, DENSE CONSOLIDATION AT THE BASE LEFT UPPER LOBE LEFT LINGULAR. UNDERLYING MASS NOT EXCLUDED REGION.  AREA OF INFILTRATE CONSOLIDATION LEFT LOWER LOBE WITH MODERATE TO LARGE LEFT PLEURAL EFFUSION. Assessment and Plan:        27-year-old female group home resident with mental retardation, seizure disorder, hypertension, CKD 3, h/o sinus pause s/p pacemaker presents from group home for 2 weeks of worsening nausea, dyspnea, and cough.     1.  Hypoxemic respiratory failure secondary to multilobar pneumonia concerning for aspiration  - Continue with the same regimen of antibiotics  -Continue with

## 2020-02-18 NOTE — PROGRESS NOTES
Physician Progress Note    2/18/2020   10:38 AM    Name:  Lashanda Zuleta  MRN:    [id number]      Day: 4     Admit Date: 2/14/2020  1:00 PM  PCP: Fazal Weir MD    Code Status:  Full Code    Subjective:   Pt was seen and examined this morning, was in bed in no acute distress, no issues over night  Pt was seen after the thoracentesis, was not complaining of issues.     Current Facility-Administered Medications   Medication Dose Route Frequency Provider Last Rate Last Dose    cefepime (MAXIPIME) 2 g IVPB minibag  2 g Intravenous Q12H Ricky Vaughn MD   Stopped at 02/18/20 0010    sodium chloride flush 0.9 % injection 10 mL  10 mL Intravenous 2 times per day Daniel MAGGIE Wolf   10 mL at 02/17/20 2017    sodium chloride flush 0.9 % injection 10 mL  10 mL Intravenous PRN Daniel MAGGIE Wolf   10 mL at 02/15/20 1430    magnesium hydroxide (MILK OF MAGNESIA) 400 MG/5ML suspension 30 mL  30 mL Oral Daily PRN DanielMAGGIE Bernal        [facility]ron [facility] injection 4 mg  4 mg Intravenous Q6H PRN Daniel MAGGIE Wolf        enoxaparin (LOVENOX) injection 30 mg  30 mg Subcutaneous Daily Daniel MAGGIE Wolf   30 mg at 02/17/20 1030    azithromycin (ZITHROMAX) 500 mg in D5W 250ml addavial  500 mg Intravenous Q24H Melissa Harika, [address]   Stopped at 02/17/20 1757    acetaminophen (TYLENOL) tablet 650 mg  650 mg Oral Q4H PRN Melissa Harika, [address]        ipratropium-albuterol (DUONEB) nebulizer solution 1 ampule  1 ampule Inhalation Q4H PRN Sara Furbrigitte, DO        carvedilol (COREG) tablet 12.5 mg  12.5 mg Oral BID WC Sarapop Telles, DO   12.5 mg at 02/17/20 1723    isosorbide mononitrate (IMDUR) extended release tablet 30 mg  30 mg Oral Daily Sara Furbrigitte, DO   30 mg at 02/17/20 1027    levETIRAcetam (KEPPRA) tablet 500 mg  500 mg Oral BID Sara Telles DO   500 mg at 02/17/20 2016    phenytoin (DILANTIN) ER capsule 100 mg  100 mg Oral BID L[place] Foot pneumonia concerning for aspiration  - Continue with the same regimen of antibiotics  -Continue with oxygen through the nasal cannula to keep her oxygen saturation above 90%  -Us guided  thora per IR is planned for today as this is a medical necessity given to the patient is mentally disabled and cannot consent for self, I agree with the procedure on I totally agree that is his needed is as possible due to hypoxemic respiratory failure that was mostly resulted from a pneumonia. -repeat CT chest in 6 weeks     2. History of chronic kidney disease stage III, creatinine is at baseline    3. Possible UTI: Urine cultures consistent with Klebsiella pneumonia, continue with his hemodialysis    4. Dysphagia: appreciate SLP assistance, continue with puréed diet    Patient is requesting a hospital bed to keep her head of bed above 30 degrees due to aspiration risk due to chronic CHF, MR/DT, and hypoxemia. Pillows/wedges have been considered and ruled out. Seizure Disorder  HTN    Diet: DIET GENERAL; Low Sodium (2 GM);  Dysphagia Minced and Moist  Ppx: lovenox  Full Code    Dispo: return to group home when medically stable    Electronically signed by Radha Allen MD on 2/18/2020 at 10:38 AM

## 2020-02-18 NOTE — PROGRESS NOTES
[facility]  Facility/Department: [facility]  Speech Language Pathology   Treatment Note      Betzaida Pryor [facility]  5/9/1931  [id number]    Medical Dx: Pneumonia [J18.9]  Speech Dx: Dysphagia     2/18/2020    Subjective:  Lethargic and Confused        Interventions used this date:  Dysphagia Treatment    Objective/Assessment:  Patient progressing towards goals:  Short-term Goals  Timeframe for Short-term Goals: 1-2 weeks for LOS or until goals met    Goal 1: Patient will tolerate least restrictive diet with no overt s/s of difficulty or aspiration. Pt moaning upon SLP entering room with eyes closed, with leg hung over the side of the bed. PCA and SLP re-positioned the patient. Pt's uneaten breakfast tray at bedside. After encouragement, pt agreed to juice. Pt tolerated sips of thin liquid via straw in 3/3 opportunities without overt s/s of aspiration. Pt initially p/w difficulty forming seal around straw and was blowing air into it to create bubbles in the cup. After wait time, pt was able to functionally use the straw. Pt tolerated 1/2 tsp amount of cream of wheat x1 with anterior bolus loss observed at midline. Increased lingual pumping observed. Pt allowed only 1 bite of cream of wheat and 3 sips of thin liquid before refusing additional amounts by closing her mouth or turning her head away. Scrambled eggs and mandarin oranges on breakfast tray were not trialed secondary to pt's lethargy and decreased ability to follow directions at this time. SLP discussed with RN Meme. Swallow guide with recommended swallow strategies hung in patient's room. Continue with pt's recommended diet only when pt is alert enough to safely participate. Dysphagia Goals:  The patient will tolerate recommended diet without observed clinical signs of aspiration  Compensatory Swallowing Strategies: Small bites/sips, Eat/Feed slowly, Total feed, Upright as possible for all oral intake, Other (comments)(ensure pt

## 2020-02-18 NOTE — SEDATION DOCUMENTATION
NO SEDATION    1117 Telephone consent obtained from Dr Stacy Stern for medical necessity - thoracentesis. 1128 Pt to [address] room 3 via cart. Pt's eyes closed, moans occasionally. Hx MRDD. Skin warm and dry, respirations even, pursed lip breathing noted. 92% on 3 L NC.     1130 Patient laying on cart on her right side. Posterior lung fields scanned with ultrasound by Ultrasound technician. 200 Notified Dr Debora Elizalde that pt is ready for procedure. 1150 Dr Debora Elizalde here. 1151 Images reviewed by performing Radiologist.     1152 Time out completed, site marked by Dr. Debora Elizalde, then procedure site prepped with chloraprep, and draped with sterile drape and towels. 1154 Left Posterior chest site then numbed with lidocaine 2% by Dr Pat Gaines [facility] 5F catheter placed into pleural space using US guidance. Fluid appears yellow, slightly cloudy. Sample of fluid obtained and placed into specimen containers to be sent for testing as ordered. Total 50 ml removed. Catheter removed. Bandaid applied. VSS. Pt tolerated well. Verbal and tactile reassurance given throughout. 1205 Patient taken for CXR and specimen fluid taken to lab. Report called to [facility] [address] [facility].    1225 Per Shanice Rivera CNP, chest xray \"looks good\". Pt ok to go back to the floor. Awaiting transport.

## 2020-02-18 NOTE — PROGRESS NOTES
Phil, DO   100 mg at 20 1319    sertraline (ZOLOFT) tablet 50 mg  50 mg Oral Daily Qing Mckeon DO   50 mg at 20 1319       Physical Examination:      Vitals:  BP (!) 155/63   Pulse 70   Temp 97.2 °F (36.2 °C) (Oral)   Resp 22   Ht 5' 3\" (1.6 m)   Wt 145 lb (65.8 kg)   SpO2 92%   BMI 25.69 kg/m²   Temp (24hrs), Av.2 °F (36.2 °C), Min:97.2 °F (36.2 °C), Max:97.2 °F (36.2 °C)      General appearance: no distress, smiling, saying yes/no and hello/bye but does not follow any commands  Lungs: Diminished breath sounds on the left up to mid chest.  normal effort  Heart: regular rate and rhythm, no murmur  Abdomen: soft, nontender, nondistended, bowel sounds present, no masses  Extremities: no edema, redness, tenderness in the calves  Skin: no gross lesions, rashes    Data:     Labs:  Recent Labs     20  0644 20  0631   WBC 6.9 5.8   HGB 10.1* 10.0*    183     Recent Labs     20  0644 20  0631   * 144   K 4.1 4.0   * 112*   CO2 18* 17*   BUN 32* 35*   CREATININE 1.10* 1.05*   GLUCOSE 137* 126*     No results for input(s): AST, ALT, ALB, BILITOT, ALKPHOS in the last 72 hours. CT Chest:  THERE IS AN AREA OF INFILTRATE CONSOLIDATION/MASS EXTENDING FROM THE RIGHT INFRAHILAR REGION OUT TO THE BASE, PLEURA OF THE RIGHT UPPER LOBE. MALIGNANCY IS NOT EXCLUDED. ATELECTASIS, PATCHY TO COALESCENT GLASS AND GROUNDGLASS INFILTRATE RIGHT LOWER LOBE. INFILTRATE, DENSE CONSOLIDATION AT THE BASE LEFT UPPER LOBE LEFT LINGULAR. UNDERLYING MASS NOT EXCLUDED REGION.  AREA OF INFILTRATE CONSOLIDATION LEFT LOWER LOBE WITH MODERATE TO LARGE LEFT PLEURAL EFFUSION. Assessment and Plan:        75-year-old female group home resident with mental retardation, seizure disorder, hypertension, CKD 3, h/o sinus pause s/p pacemaker presents from group [place] for 2 weeks of worsening nausea, dyspnea, and cough.     1.  Hypoxemic respiratory failure secondary to multilobar pneumonia concerning for aspiration  - Continue with the same regimen of antibiotics  -Continue with oxygen through the nasal cannula to keep her oxygen saturation above 90%  -Us guided  thora per IR with 50 ml taken out, samples were sent for analysis and cultures  -repeat CT chest in 6 weeks     2. History of chronic kidney disease stage III, creatinine is at baseline    3. Possible UTI: Urine cultures consistent with Klebsiella pneumonia, continue with his hemodialysis    4. Dysphagia: appreciate SLP assistance, continue with puréed diet    Patient is requesting a hospital bed to keep her head of bed above 30 degrees due to aspiration risk due to chronic CHF, MR/DT, and hypoxemia. Pillows/wedges have been considered and ruled out. Seizure Disorder  HTN    Diet: DIET GENERAL; Low Sodium (2 GM);  Dysphagia Minced and Moist  Ppx: lovenox  Full Code    Dispo: return to group home when medically stable    Electronically signed by Maria Elena Trevino MD on 2/18/2020 at 3:39 PM

## 2020-02-18 NOTE — CARE COORDINATION
Patient off unit for procedure. Patient is MRDD and lives in a group home. CHF/Pneumonia booklets and zones sheet left at bedside for caregiver who is not currently present.

## 2020-02-18 NOTE — PROGRESS NOTES
Pt incont of urine on shift   Vital signs WNL  Being repositioned q2h  Tolerating intermit infusions well

## 2020-02-19 NOTE — DISCHARGE SUMMARY
Hospital Medicine Discharge Summary    Eloy Quispe  :  1931  MRN:  [id number]    Admit date:  2020  Discharge date:  2020    Admitting Physician:  Qing Mckeon DO  Primary Care Physician:  Germán Watson MD      Discharge Diagnosis:  Acute hypoxemic resp failure due to chemical pneumonitis secondary to aspiration    Hospital Course: This is an 80-year-old female with history of heart failure, chronic kidney disease, MRDD with developmental disability, seizure disorder, who presented to the hospital with progressive cough, patient is a poor historian, was not able to get reliable history from her, apparently she has had nausea and vomiting as well for up to 2 weeks at the group home. Chest x-ray showed bilateral infiltrates, CT of the chest also confirmed multilobar consolidations some of which seems to have a mass appearance. She has mild leukocytosis, significantly increased procalcitonin level. Patient was admitted for acute hypoxemic resp failure due to multilobar pneumonia due to chemical pneumonitis secondary to aspiration, and started on nasal cannula/antibiotics with breathing treatments, and speech eval was done Patient was started on dysphagia 2 diet due to aspiration risk. All cultures came back negative. During this hospital stay, chest x-ray was concerning for left-sided pleural effusion that was increasing in size for which thoracentesis was done and 50 mL was taken due to loculation. Cultures of the pleural fluid was negative. The patient has been improving on a daily basis and she is medically cleared to be discharged today. Patient will be discharged home as she have a 24-hour caretaker, hospital bed and home O2 were prescribed. The patient will be prescribed Augmentin for 7 more days to follow-up with her PCP and to repeat chest CT of the chest in 6 weeks.     During this hospitalization, ECHO was done that was consistent with diastolic dysfunction, pt was not in any acute exacerbation throughout the stay and her pleural effusion was mostly due to complicated parapneumonic effusion as evidenced by loculations      Patient was seen by the following consultants while admitted to [facility]:   Consults:  Wolfgang Pérez HOSPITALIST  IP CONSULT TO PULMONOLOGY  IP CONSULT TO INTERVENTIONAL RADIOLOGY    Physical exam:  General appearance: No apparent distress, appears stated age and cooperative. HEENT: Pupils equal, round, and reactive to light. Conjunctivae/corneas clear. Neck: Supple, with full range of motion. No jugular venous distention. Trachea midline. Respiratory:  Normal respiratory effort. Clear to auscultation, bilaterally without Rales/Wheezes/Rhonchi. Cardiovascular: Regular rate and rhythm with normal S1/S2 without murmurs, rubs or gallops. Abdomen: Soft, non-tender, non-distended with normal bowel sounds. Musculoskeletal: No clubbing, cyanosis or edema bilaterally. Full range of motion without deformity. Skin: Skin color, texture, turgor normal.  No rashes or lesions. Neuro: Non Focal. Symetrical motor and tone. Nl Comprehension, Alert,awake and oriented. NL CN. Symetrical tone and reflexes.   Psychiatric: Alert and oriented, thought content appropriate, normal insight  Capillary Refill: Brisk,< 3 seconds   Peripheral Pulses: +2 palpable, equal bilaterally     Discharge Medications:       Antolin Milton   Home Medication Instructions W:[id number]    Printed on:02/19/20 1326   Medication Information                      amLODIPine (NORVASC) 10 MG tablet  TK 1 T PO D             amoxicillin-clavulanate (AUGMENTIN) 875-125 MG per tablet  Take 1 tablet by mouth every 12 hours for 7 days             aspirin 81 MG tablet  Take 81 mg by mouth daily             carvedilol (COREG) 12.5 MG tablet  Take 1 tablet by mouth 2 times daily (with meals)             Cholecalciferol (VITAMIN D) 2000 units CAPS capsule  Take 2,000 Units by mouth daily             furosemide (LASIX) 40 MG tablet  Take 1 tablet by mouth daily             isosorbide mononitrate (IMDUR) 30 MG extended release tablet  Take 1 tablet by mouth daily             levETIRAcetam (KEPPRA) 500 MG tablet  Take 1 tablet by mouth 2 times daily             phenytoin (DILANTIN) 100 MG ER capsule  Take 100 mg by mouth 2 times daily              potassium chloride (KLOR-CON) 10 MEQ extended release tablet  TK 1 T PO 3 TIMES A WEEK             sertraline (ZOLOFT) 50 MG tablet  Take 50 mg by mouth daily             spironolactone (ALDACTONE) 25 MG tablet  Take 1 tablet by mouth daily                 Disposition:   Discharged to  Home. Any [facility] needs that were indicated and/or required as been addressed and set up by Social Work. Condition at discharge: Pt was medically stable at the time of discharge. Significant improvement in clinical condition compared to initial condition at presentation to hospital    Activity: activity as tolerated, fall precautions. Total time taken for discharging this patient: 40 minutes. Greater than 70% of time was spent focused exclusively on this patient. Time was taken to review chart, discuss plans with consultants, reconciling medications, discussing plan answering questions with patient. Signed:  Yoselin Arnett  2/19/2020, 1:26 PM  ----------------------------------------------------------------------------------------------------------------------    Elise Cruz,     Please return to ER or call 911 if you develop any significant signs or symptoms. I may not have addressed all of your medical illnesses or the abnormal blood work or imaging therefore please ask your PCP, Lj Nagy MD ,  to obtain [facility] record to follow up on all of the abnormal labs, imaging and findings that I have and have not addressed during your hospitalization. Discharging you from the hospital does not mean that your medical care ends here and now. You may still need additional work up, investigation, monitoring, and treatment to be handled from this point on by outside providers including your PCP, Bill Yi MD , Specialists and other healthcare providers. Please review your list of discharge medications prior to resuming medications you might still have at home, as the medications you need to be taking, dosages or how often you must take them may have changed. For medication questions, contact your retail pharmacy and your PCP, Bill Yi MD .     ** I STRONGLY RECOMMEND that you follow up with Bill Yi MD within 3 to 5 days for a post hospitalization evaluation. This specific office visit is covered by your insurance, and is not the same as your annual doctor visit/ check up. This office visit is important, as it may prevent need for repeat and/or future hospitalizations. **    Your medical team at [facility] ([facility]) appreciates the opportunity to work with you to get well!     Sincerely,  .S. Padmaja Arnett

## 2020-02-19 NOTE — PROGRESS NOTES
INPATIENT PROGRESS NOTES    PATIENT NAME: Cira Kirby  MRN: [id number]  SERVICE DATE:  February 19, 2020   SERVICE TIME:  4:04 PM      PRIMARY SERVICE: Pulmonary Disease    CHIEF COMPLAINTS: Pneumonia    INTERVAL HPI: Patient seen and examined at bedside, Interval Notes, orders reviewed. Nursing notes noted    Patient laying in bed, no distress, room air pulse oximetry 90 to 91%, no fever, no vomiting, no issues overnight in general.    Review of system:     GI Abdominal pain No  Skin Rash No    Social History     Tobacco Use    Smoking status: Never Smoker    Smokeless tobacco: Never Used   Substance Use Topics    Alcohol use: No         Family history unknown: Yes         OBJECTIVE    Body mass index is 25.69 kg/m². PHYSICAL EXAM:  Vitals:  BP (!) 159/51   Pulse 65   Temp 97.8 °F (36.6 °C) (Axillary)   Resp 19   Ht 5' 3\" (1.6 m)   Wt 145 lb (65.8 kg)   SpO2 93%   BMI 25.69 kg/m²     General: Sleepy,   no distress  Head: normocephalic, atraumatic  Eyes:No gross abnormalities. ENT:  MMM no lesions  Neck:  supple and no masses  Chest : Diminished air movement, minimal basal rales, no wheezing, nontender, tympanic  Heart[de-identified] Heart sounds are normal.  Regular rate and rhythm without murmur, gallop or rub. ABD:  symmetric, soft, non-tender  Musculoskeletal : no cyanosis, no clubbing and no edema  Neuro:  Grossly normal at her baseline immobile  Skin: No rashes or nodules noted.   Lymph node:  no cervical nodes  Urology: No Cham[place]   Psychiatric: appropriate    DATA:   Recent Labs     02/18/20  0631 02/19/20  0642   WBC 5.8 4.5*   HGB 10.0* 10.3*   HCT 31.4* 32.1*   MCV 95.1 95.3    194     Recent Labs     02/18/20  0631 02/19/20  0642    145*   K 4.0 4.3   * 114*   CO2 17* 18*   BUN 35* 36*   CREATININE 1.05* 1.03*   GLUCOSE 126* 132*   CALCIUM 8.4* 8.5   LABGLOM 49.4* 50.5*   GFRAA 59.7* >60.0       MV Settings:          No results for input(s): PHART, EIL9IVS, PO2ART, UZP6UXY, BEART, No acute osseous abnormality. No significant interval change of right upper lobe consolidation and moderate left pleural effusion and left lung base consolidations. Xr Chest Standard (2 Vw)    Result Date: 2/14/2020  EXAMINATION: XR CHEST (2 VW) CLINICAL HISTORY: COUGH, NAUSEA, VOMITING FOR 2 WEEKS COMPARISONS: AUGUST 9 2016 FINDINGS: Pacemaker generator and wires unchanged. Osteopenia. Low lung volumes. Cardiac size normal. Pulmonary vasculature is distinct. Ill-defined area increased opacity right upper lobe marginated by the minor fissure, with ill-defined area increased opacity also found in right lower lung. Right diaphragm elevated. Lung shows blunting costophrenic angle with ill-defined areas increase opacity left midlung zone and left lower lung. CONGESTIVE HEART FAILURE. Ct Chest Wo Contrast    Result Date: 2/14/2020  EXAMINATION:  CT SCAN CHEST CLINICAL HISTORY:  Abnormal chest x-ray COMPARISON:  None TECHNIQUE:  Multiple serial axial images of the chest from the base neck through the upper abdomen with both sagittal coronal reconstruction was performed without intravenous or oral administration of contrast. FINDINGS:  There is an area of infiltrate consolidation/mass extending from the right infrahilar region out to the base, pleura of the right upper lobe There is an area of atelectasis, patchy to glass and groundglass infiltrate right lower lobe. There is an area of infiltrate, dense consolidation at the base left upper lobe left lingular region. Underlying mass not excluded. There is an area of infiltrate consolidation left lower lobe with air bronchograms with moderate to large left pleural effusion. No significant periaortic. Left perihilar adenopathy. Adenopathy. There is subcarinal adenopathy. Trace pericardial effusion. In the field-of-view of the abdomen there is a moderate hiatal hernia. There is a partially visualized cystic area on the right.  Exophytic cyst at the superior pole the right kidney is seen by CT scan January 11, 2019. There is multilevel degenerative changes supraspinatus upon a moderate dorsal kyphosis. There is a old compression fracture of T12 again noted. THERE IS AN AREA OF INFILTRATE CONSOLIDATION/MASS EXTENDING FROM THE RIGHT INFRAHILAR REGION OUT TO THE BASE, PLEURA OF THE RIGHT UPPER LOBE. MALIGNANCY IS NOT EXCLUDED. ATELECTASIS, PATCHY TO COALESCENT GLASS AND GROUNDGLASS INFILTRATE RIGHT LOWER LOBE. INFILTRATE, DENSE CONSOLIDATION AT THE BASE LEFT UPPER LOBE LEFT LINGULAR. UNDERLYING MASS NOT EXCLUDED REGION. AREA OF INFILTRATE CONSOLIDATION LEFT LOWER LOBE WITH MODERATE TO LARGE LEFT PLEURAL EFFUSION. OTHER FINDINGS DETAILED ABOVE All CT scans at this facility use dose modulation, iterative reconstruction, and/or weight based dosing when appropriate to reduce radiation dose to as low as reasonably achievable.             IMPRESSION AND SUGGESTION:  Patient is at risk due to   · Multilobar masslike infiltrate  · Parapneumonic exudative left-sided effusion  · Probable aspiration pneumonia, need follow-up to confirm resolution malignancy cannot completely be ruled out      Recommendations    · Follow-up cultures  · Complete 7 days of antibiotic treatment she will need 4 more days of Augmentin  · Repeat CT chest in 6 weeks to confirm resolution  · Incentive spirometry  · Patient is immobile, pulse oximetry in bed reviewed she did not drop below 88%, will hold on ordering oxygen at home for now  · Aspiration precaution  · Okay to DC from pulmonary point of view  · Follow-up with Dr. Santhosh Marks in 2 weeks        Electronically signed by Dalia Garibay MD,  [facility]   on 2/19/2020 at 4:04 PM

## 2020-02-19 NOTE — PROGRESS NOTES
Spoke with Elver Ortega care giver on phone about [facility] discharge. She verbalized understanding of discharge.

## 2020-02-19 NOTE — PROGRESS NOTES
[facility]   Facility/Department: 1980 [facility]  Speech Language Pathology    Allie Arciniega [facility]  5/9/1931  [id number]    Date: 2/19/2020      Speech Therapy attempted to see Jagdish Whitt on this date for a/an:    Treatment    Pt was unable to be seen due to:   Patient refused. Pt stated that she was full and did not want to eat right now. Per nursing Cj Harper RN), pt ate all her breakfast with PCA. Will re-attempt when able.         Electronically signed by Kailyn Haider, Student SLP, on 2/19/20 at 10:24 AM

## 2020-02-19 NOTE — PROGRESS NOTES
Vascular and Interventional Radiology   Mary Signs. Isaías Helton      Subjective: Cira Kirby is a 80 y.o. female with loculated left pleural effusion for thoracentesis today. Objective:   BP (!) 159/51   Pulse 65   Temp 97.8 °F (36.6 °C) (Axillary)   Resp 19   Ht 5' 3\" (1.6 m)   Wt 145 lb (65.8 kg)   SpO2 93%   BMI 25.69 kg/m²     Physical:  thin and ill appearing, but non-toxic    Normocephalic, without obvious abnormality, atraumatic    Neck supple. No adenopathy. Thyroid symmetric, normal size, and without nodularity    normal rate and regular rhythm    air entry reduced BALJIT and LLL        Lab:  Recent Labs     02/19/20  0642   WBC 4.5*   RBC 3.37*   HGB 10.3*   HCT 32.1*   MCV 95.3   MCH 30.7   MCHC 32.2*   RDW 15.6*          No results for input(s): INR, PROTIME in the last 72 hours. Recent Labs     02/17/20  0644 02/18/20  0631 02/19/20  0642   CREATININE 1.10* 1.05* 1.03*       Assessment: Cira Kirby is a 80 y.o. female with left loculated pleural effusion. Plan: Ultrasound guided left thoracentesis. Mary Signs.  Isaías Helton  2/19/2020,  3:34 PM

## 2020-02-21 PROBLEM — J96.21 ACUTE ON CHRONIC RESPIRATORY FAILURE WITH HYPOXIA (HCC): Status: ACTIVE | Noted: 2020-01-01

## 2020-02-21 PROBLEM — J68.0 CHEMICAL PNEUMONITIS (HCC): Status: ACTIVE | Noted: 2020-01-01

## 2020-02-21 PROBLEM — R10.9 ABDOMINAL PAIN: Status: ACTIVE | Noted: 2020-01-01

## 2020-02-21 PROBLEM — R13.10 DYSPHAGIA: Status: ACTIVE | Noted: 2020-01-01

## 2020-02-21 PROBLEM — E86.0 DEHYDRATION: Status: ACTIVE | Noted: 2020-01-01

## 2020-02-21 NOTE — ED TRIAGE NOTES
Per care taker pt was released from hospital yesterday, pt has been complaining of lower ABD pain since she arrived home. Pt has loose stool since this morning and would become more solid through out the day.

## 2020-02-21 NOTE — ED NOTES
pts care giver was called advised of admission and room number,      Nav Burroughs RN  02/21/20 [id number]

## 2020-02-21 NOTE — ED NOTES
Pt was straight cath for urine sample due to incontinence. Noted pt was changed at this time and O2 was off for a few mins, pts stat dropped to 84% on Room air.       Jacqui Lux RN  02/21/20 2910

## 2020-02-21 NOTE — PROGRESS NOTES
Pharmacy Dose Adjustment Per Protocol    Rowena Cicero Canavan is a 80 y.o. female. Recent Labs     02/19/20  0642 02/20/20  2300   BUN 36* 30*       Recent Labs     02/20/20  2309 02/20/20  2348   CREATININE 1.1 1.2       Estimated Creatinine Clearance: 30 mL/min (based on SCr of 1.2 mg/dL). Height:   Ht Readings from Last 1 Encounters:   02/20/20 5' 3\" (1.6 m)     Weight:  Wt Readings from Last 1 Encounters:   02/20/20 145 lb (65.8 kg)         Drug Ordered Therapeutic Interchange (CrCL ?  30 mL/min)   [x] Enoxaparin 40 mg subq daily Enoxaparin 30 mg subq daily   [] Enoxaparin 1 mg/kg subq BID Enoxaparin ________ (1 mg/kg) subq daily    [] Enoxaparin 30 subq BID Enoxaparin 30 mg subq daily

## 2020-02-21 NOTE — H&P
D 9/22/19  Yes Historical Provider, MD   potassium chloride (KLOR-CON) 10 MEQ extended release tablet TK 1 T PO 3 TIMES A WEEK 9/29/19  Yes Historical Provider, MD   Cholecalciferol (VITAMIN D) 2000 units CAPS capsule Take 2,000 Units by mouth daily   Yes Historical Provider, MD   levETIRAcetam (KEPPRA) 500 MG tablet Take 1 tablet by mouth 2 times daily 3/26/17  Yes Saskia Bragg DO   aspirin 81 MG tablet Take 81 mg by mouth daily   Yes Historical Provider, MD   sertraline (ZOLOFT) 50 MG tablet Take 50 mg by mouth daily   Yes Historical Provider, MD   carvedilol (COREG) 12.5 MG tablet Take 1 tablet by mouth 2 times daily (with meals) 12/6/16  Yes Tato Bloom MD   furosemide (LASIX) 40 MG tablet Take 1 tablet by mouth daily  Patient taking differently: Take 40 mg by mouth every other day  12/6/16  Yes Tato Bloom MD   isosorbide mononitrate (IMDUR) 30 MG extended release tablet Take 1 tablet by mouth daily 10/26/16  Yes Tato Bloom MD   spironolactone (ALDACTONE) 25 MG tablet Take 1 tablet by mouth daily  Patient taking differently: Take 25 mg by mouth 2 times daily  10/26/16  Yes Tato Bloom MD   phenytoin (DILANTIN) 100 MG ER capsule Take 100 mg by mouth 2 times daily    Yes Historical Provider, MD       ALLERGIES: Patient has no known allergies.     REVIEW OF SYSTEM:   Unable to do     OBJECTIVE  PHYSICAL EXAM: /66   Pulse 72   Temp 97.5 °F (36.4 °C) (Oral)   Resp 20   Ht 5' 3\" (1.6 m)   Wt 145 lb (65.8 kg)   SpO2 94%   BMI 25.69 kg/m²     CONSTITUTIONAL:  fatigued, difficult to arouse and distracted  ENT:  dry mucosa  LUNGS:  no increased work of breathing, good air exchange and diminished breath sounds throughout lungs  CARDIOVASCULAR:  normal apical pulses, regular rate and rhythm, normal S1 and S2, no edema and murmur  ABDOMEN:  normal bowel sounds, soft, non-distended and non-tender  MUSCULOSKELETAL:  there is no redness, warmth, or swelling of the joints  tone is normal  NEUROLOGIC: Fatigued, difficult to arouse. Cranial nerves II-XII are grossly intact. Godwin Pander SKIN:  no rashes and no lesions    DATA:     Diagnostic tests reviewed for today's visit:    Most recent labs and imaging results reviewed.      LABS:    Recent Results (from the past 24 hour(s))   Comprehensive Metabolic Panel    Collection Time: 02/20/20 11:00 PM   Result Value Ref Range    Sodium 147 (H) 135 - 144 mEq/L    Potassium 3.5 3.4 - 4.9 mEq/L    Chloride 113 (H) 95 - 107 mEq/L    CO2 22 20 - 31 mEq/L    Anion Gap 12 9 - 15 mEq/L    Glucose 207 (H) 70 - 99 mg/dL    BUN 30 (H) 8 - 23 mg/dL    CREATININE 1.12 (H) 0.50 - 0.90 mg/dL    GFR Non-African American 45.8 (L) >60    GFR  55.5 (L) >60    Calcium 8.6 8.5 - 9.9 mg/dL    Total Protein 6.3 6.3 - 8.0 g/dL    Alb 2.7 (L) 3.5 - 4.6 g/dL    Total Bilirubin 0.3 0.2 - 0.7 mg/dL    Alkaline Phosphatase 94 40 - 130 U/L    ALT 27 0 - 33 U/L    AST 19 0 - 35 U/L    Globulin 3.6 (H) 2.3 - 3.5 g/dL   CBC Auto Differential    Collection Time: 02/20/20 11:00 PM   Result Value Ref Range    WBC 6.9 4.8 - 10.8 K/uL    RBC 3.09 (L) 4.20 - 5.40 M/uL    Hemoglobin 9.6 (L) 12.0 - 16.0 g/dL    Hematocrit 28.2 (L) 37.0 - 47.0 %    MCV 91.1 82.0 - 100.0 fL    MCH 31.0 27.0 - 31.3 pg    MCHC 34.1 33.0 - 37.0 %    RDW 15.4 (H) 11.5 - 14.5 %    Platelets 506 962 - 218 K/uL    Neutrophils % 85.1 %    Lymphocytes % 8.2 %    Monocytes % 5.3 %    Eosinophils % 0.9 %    Basophils % 0.5 %    Neutrophils Absolute 5.9 1.4 - 6.5 K/uL    Lymphocytes Absolute 0.6 (L) 1.0 - 4.8 K/uL    Monocytes Absolute 0.4 0.2 - 0.8 K/uL    Eosinophils Absolute 0.1 0.0 - 0.7 K/uL    Basophils Absolute 0.0 0.0 - 0.2 K/uL   Lactic Acid, Plasma    Collection Time: 02/20/20 11:00 PM   Result Value Ref Range    Lactic Acid 1.0 0.5 - 2.2 mmol/L   Urine Reflex to Culture    Collection Time: 02/20/20 11:00 PM   Result Value Ref Range    Color, UA Yellow Straw/Yellow    Clarity, UA CLOUDY (A) Clear Glucose, Ur Negative Negative mg/dL    Bilirubin Urine Negative Negative    Ketones, Urine Negative Negative mg/dL    Specific Gravity, UA 1.045 1.005 - 1.030    Blood, Urine TRACE (A) Negative    pH, UA 5.0 5.0 - 9.0    Protein, UA 30 (A) Negative mg/dL    Urobilinogen, Urine 0.2 <2.0 E.U./dL    Nitrite, Urine Negative Negative    Leukocyte Esterase, Urine Negative Negative    Urine Reflex to Culture YES    Magnesium    Collection Time: 02/20/20 11:00 PM   Result Value Ref Range    Magnesium 2.3 1.7 - 2.4 mg/dL   Lipase    Collection Time: 02/20/20 11:00 PM   Result Value Ref Range    Lipase 167 (H) 12 - 95 U/L   PROCALCITONIN    Collection Time: 02/20/20 11:00 PM   Result Value Ref Range    Procalcitonin 0.45 (H) 0.00 - 0.15 ng/mL   POCT Arterial    Collection Time: 02/20/20 11:09 PM   Result Value Ref Range    POC Sodium 150 (H) 136 - 145 mEq/L    POC Potassium 3.2 (L) 3.5 - 5.1 mEq/L    POC Chloride 116 (H) 99 - 110 mEq/L    POC Glucose 216 (H) 60 - 115 mg/dl    POC Creatinine 1.1 0.6 - 1.2 mg/dL    GFR Non- 47 (A) >60    GFR  57 (A) >60    Calcium, Ion 1.25 1.12 - 1.32 mmol/L    pH, Arterial 7.440 7.350 - 7.450    pCO2, Arterial 32 (L) 35 - 45 mm Hg    pO2, Arterial 62 (HH) 75 - 108 mm Hg    HCO3, Arterial 21.9 21.0 - 29.0 mmol/L    Base Excess, Arterial -2 -3 - 3    O2 Sat, Arterial 93 (HH) 93 - 100 %    TCO2, Arterial 23 22 - 29    Lactate 0.60 0.40 - 2.00 mmol/L    POC Hematocrit 25 (L) 36 - 48 %    Hemoglobin 8.5 (L) 12.0 - 16.0 gm/dL    FIO2 4.000     Sample Type ART     Performed on SEE BELOW    POCT Venous    Collection Time: 02/20/20 11:48 PM   Result Value Ref Range    POC Creatinine 1.2 0.6 - 1.2 mg/dL    GFR Non-African American 42 (A) >60    GFR  51 (A) >60    Sample Type ANA     Performed on SEE BELOW    POCT CREATININE    Collection Time: 02/20/20 11:49 PM   Result Value Ref Range    POC CREATININE WHOLE BLOOD 1.2    Microscopic Urinalysis

## 2020-02-21 NOTE — PROGRESS NOTES
Hospitalist Progress Note      PCP: Bill Yi MD    Date of Admission: 2/20/2020    Chief Complaint:  No acute events, afebrile, stable HD, poor historian, minimally verbal due to MRDD    Medications:  Reviewed    Infusion Medications    sodium chloride 100 mL/hr at 02/21/20 0316     Scheduled Medications    sodium chloride flush  10 mL Intravenous 2 times per day    piperacillin-tazobactam  3.375 g Intravenous Q8H    azithromycin  500 mg Intravenous Q24H    pantoprazole  40 mg Intravenous Daily    And    sodium chloride (PF)  10 mL Intravenous Daily    pill splitter   Does not apply Once    enoxaparin  30 mg Subcutaneous Daily     PRN Meds: sodium chloride flush, acetaminophen, acetaminophen, polyethylene glycol, promethazine, ondansetron      Intake/Output Summary (Last 24 hours) at 2/21/2020 1244  Last data filed at 2/21/2020 0523  Gross per 24 hour   Intake 450 ml   Output 250 ml   Net 200 ml       Exam:    BP (!) 153/41 Comment: Notified LPN Beltran [place] about abnormal BP  Pulse 67   Temp 97.2 °F (36.2 °C) (Oral)   Resp 20   Ht 5' 3\" (1.6 m)   Wt 145 lb (65.8 kg)   SpO2 94%   BMI 25.69 kg/m²     General appearance: awake, minimally verbal due to MRDD  HEENT: has oral thrush  Respiratory: diminished BS bilaterally . Cardiovascular: Regular rate and rhythm with normal S1/S2. Abdomen: Soft, active bowel sounds. Musculoskeletal: No edema bilaterally.      Labs:   Recent Labs     02/19/20  0642 02/20/20  2300 02/20/20  2309 02/21/20  0621   WBC 4.5* 6.9  --  6.3   HGB 10.3* 9.6* 8.5* 8.7*   HCT 32.1* 28.2*  --  26.4*    220  --  203     Recent Labs     02/19/20  0642 02/20/20  2300 02/20/20  2309 02/20/20  2348   * 147*  --   --    K 4.3 3.5  --   --    * 113*  --   --    CO2 18* 22  --   --    BUN 36* 30*  --   --    CREATININE 1.03* 1.12* 1.1 1.2   CALCIUM 8.5 8.6  --   --      Recent Labs     02/20/20  2300   AST 19   ALT 27   BILITOT 0.3   ALKPHOS 94     No results for

## 2020-02-21 NOTE — PROGRESS NOTES
Assumed care of pt at 0700. Assessment complete. Guardian at bedside voiced concerns stating that pt is not acting how she normally acts at home. Concerns relayed to Dr. Edgard Bond. Pt yelling out, given  PRN Haldol per order. At approximately [phone], Dr. Edgard Bond notified of results of CT of chest, awaiting new orders. Pt left resting in bed with call light within reach and bed alarm on. Avasys in place for pt safety. Will continue to monitor.  Electronically signed by Emmanuel Perez RN on 2/21/2020 at 4:25 PM

## 2020-02-21 NOTE — ED PROVIDER NOTES
[address] [facility] ED  eMERGENCYdEPARTMENT eNCOUnter      Pt Name: Eloy Quispe  MRN: [id number]  Beatrizgfeleazar 5/9/1931  Date of evaluation: 2/20/2020  Provider:Te Godinez PA-C    CHIEF COMPLAINT       Chief Complaint   Patient presents with    Abdominal Pain         HISTORY OF PRESENT ILLNESS  (Location/Symptom, Timing/Onset, Context/Setting, Quality, Duration, Modifying Factors, Severity.)   Eloy Quispe is a 80 y.o. female who presents to the emergency department from group home with reports of lower abdominal pain and loose stool that began this morning. Patient was recently admitted into the hospital for pneumonia and was discharged home yesterday. Group home staff states that the patient began complaining of lower abdominal pain this morning. There is no vomiting. Patient has a history of MR and is unable to provide any further insight into the nature of her illness. HPI    Nursing Notes were reviewed and I agree. REVIEW OF SYSTEMS    (2-9 systems for level 4, 10 or more for level 5)     Review of Systems   Unable to perform ROS: Other        Except as noted above the remainder of the review of systems was reviewed and negative.        PAST MEDICAL HISTORY     Past Medical History:   Diagnosis Date    CHF (congestive heart failure) ([address]     Chronic CHF ([address] 5/27/2016    CKD (chronic kidney disease), stage III ([address] 8/5/2018    Depression     Developmental disability 5/27/2016    Dizzy spells 7/18/2016    HTN (hypertension) 5/27/2016    Hyperlipidemia     Hypertension     Hypokalemia 5/27/2016    Hypoxia 5/27/2016    Mental retardation 5/27/2016    Mental retardation     Seizures (HCC)     SOB (shortness of breath) 7/18/2016    Unstable gait 7/19/2016         SURGICAL HISTORY       Past Surgical History:   Procedure Laterality Date    CHOLECYSTECTOMY           CURRENT MEDICATIONS       Previous Medications    AMLODIPINE (NORVASC) 10 MG TABLET    TK 1 T PO D AMOXICILLIN-CLAVULANATE (AUGMENTIN) 875-125 MG PER TABLET    Take 1 tablet by mouth every 12 hours for 7 days    ASPIRIN 81 MG TABLET    Take 81 mg by mouth daily    CARVEDILOL (COREG) 12.5 MG TABLET    Take 1 tablet by mouth 2 times daily (with meals)    CHOLECALCIFEROL (VITAMIN D) 2000 UNITS CAPS CAPSULE    Take 2,000 Units by mouth daily    FUROSEMIDE (LASIX) 40 MG TABLET    Take 1 tablet by mouth daily    ISOSORBIDE MONONITRATE (IMDUR) 30 MG EXTENDED RELEASE TABLET    Take 1 tablet by mouth daily    LEVETIRACETAM (KEPPRA) 500 MG TABLET    Take 1 tablet by mouth 2 times daily    PHENYTOIN (DILANTIN) 100 MG ER CAPSULE    Take 100 mg by mouth 2 times daily     POTASSIUM CHLORIDE (KLOR-CON) 10 MEQ EXTENDED RELEASE TABLET    TK 1 T PO 3 TIMES A WEEK    SERTRALINE (ZOLOFT) 50 MG TABLET    Take 50 mg by mouth daily    SPIRONOLACTONE (ALDACTONE) 25 MG TABLET    Take 1 tablet by mouth daily       ALLERGIES     Patient has no known allergies.     FAMILY HISTORY       Family History   Family history unknown: Yes          SOCIAL HISTORY       Social History     Socioeconomic History    Marital status: Single     Spouse name: None    Number of children: None    Years of education: None    Highest education level: None   Occupational History    None   Social Needs    Financial resource strain: None    Food insecurity:     Worry: None     Inability: None    Transportation needs:     Medical: None     Non-medical: None   Tobacco Use    Smoking status: Never Smoker    Smokeless tobacco: Never Used   Substance and Sexual Activity    Alcohol use: No    Drug use: No    Sexual activity: Never   Lifestyle    Physical activity:     Days per week: None     Minutes per session: None    Stress: None   Relationships    Social connections:     Talks on phone: None     Gets together: None     Attends Confucianist service: None     Active member of club or organization: None     Attends meetings of clubs or organizations: None images are visualized and preliminarilyinterpreted by Allie Fischer PA-C with the below findings:      Interpretation per the Radiologist below, if available at the time of this note:    CT ABDOMEN PELVIS W IV CONTRAST Additional Contrast? None    (Results Pending)   CT CHEST W CONTRAST    (Results Pending)       LABS:  Labs Reviewed   COMPREHENSIVE METABOLIC PANEL - Abnormal; Notable for the following components:       Result Value    Sodium 147 (*)     Chloride 113 (*)     Glucose 207 (*)     BUN 30 (*)     CREATININE 1.12 (*)     GFR Non- 45.8 (*)     GFR  55.5 (*)     Alb 2.7 (*)     Globulin 3.6 (*)     All other components within normal limits   CBC WITH AUTO DIFFERENTIAL - Abnormal; Notable for the following components:    RBC 3.09 (*)     Hemoglobin 9.6 (*)     Hematocrit 28.2 (*)     RDW 15.4 (*)     Lymphocytes Absolute 0.6 (*)     All other components within normal limits   URINE RT REFLEX TO CULTURE - Abnormal; Notable for the following components:    Clarity, UA CLOUDY (*)     Blood, Urine TRACE (*)     Protein, UA 30 (*)     All other components within normal limits   LIPASE - Abnormal; Notable for the following components:    Lipase 167 (*)     All other components within normal limits   POCT ARTERIAL - Abnormal; Notable for the following components:    POC Sodium 150 (*)     POC Potassium 3.2 (*)     POC Chloride 116 (*)     POC Glucose 216 (*)     GFR Non- 47 (*)     GFR African American 57 (*)     pCO2, Arterial 32 (*)     pO2, Arterial 62 (*)     O2 Sat, Arterial 93 (*)     POC Hematocrit 25 (*)     Hemoglobin 8.5 (*)     All other components within normal limits   POCT CREATININE - URINE - Normal   CULTURE, URINE   LACTIC ACID, PLASMA   MAGNESIUM   MICROSCOPIC URINALYSIS       All other labs were within normal range or not returnedas of this dictation.     EMERGENCYDEPARTMENT COURSE and DIFFERENTIAL DIAGNOSIS/MDM:   Vitals:    Vitals:    02/20/20 2303 02/20/20 2330 02/21/20 0013 02/21/20 0101   BP:  (!) 131/42 (!) 152/56 (!) 158/67   Pulse:  78 78 72   Resp:   22 22   Temp:       TempSrc:       SpO2: 95% 93% 92% 93%   Weight:       Height:           REASSESSMENT        C department with reported lower abdominal pain from the group home. Patient is not able to provide any further insight into the nature of her illness today. Patient had a CT of her chest, abdomen and pelvis but shows no abdominal pelvic pathology to explain the patient's pain. Patient was hypoxic on her arrival to the emergency department with an oxygen saturation of 83% on room air. Patient was given breathing treatments and placed on nasal cannula and ABG was done that shows an O2 saturation of 93% on 4 L. When attempting to remove the oxygen from the patient, patient desaturates to the mid 80s. Patient is not on oxygen at the group home and will need to be readmitted into the hospital for further care. MDM    PROCEDURES:    Procedures      FINAL IMPRESSION      1. Hypoxia    2. Pneumonia due to organism    3. Abdominal pain, lower          DISPOSITION/PLAN   DISPOSITION        PATIENT REFERRED TO:  No follow-up provider specified.     DISCHARGE MEDICATIONS:  New Prescriptions    No medications on file       (Please note that portions of this note were completed with a voice recognition program.  Efforts were made to edit the dictations but occasionally words are mis-transcribed.)    CORBY Saldana PA-C  02/21/20 0119

## 2020-02-21 NOTE — PROGRESS NOTES
[facility] Occupational Therapy      Date: 2020  Patient Name: Aayush Dominguez        MRN: [id number]  Account: [de-identified]   : 1931  (80 y.o.)  [facility]    Pt. is of observation status. To comply with medicare and insurance regulations, to see patient we require updated orders including a valid OT treatment diagnosis. Please reorder as appropriate.      Electronically signed by RACH Drake/L on 2020 at 9:22 AM

## 2020-02-22 NOTE — FLOWSHEET NOTE
0830-Oxygen saturation only staying at 89% on 5 L. 40%Venti mask put on. Saturation increased to 91-92%. 0945- Portable chest x ray done. Patient still having labored breathing and tachypneic at 35 breaths per minute. Respiratory therapist notified that blood gases were ordered. 1- Dr. Melina Lundberg in to see patient. 1230- Patient swallowed medications in applesauce with no problems. Fed patient but she would only eat about 25% of lunch. Encouraged patient to eat more but she states \"I feel full\". Oxygen 96% on 5 L which was put on to feed patient. Patient more calm at this time. Still tachypneic but not labored with breathing. [address] [facility] working suctioning clear yellow urine.

## 2020-02-22 NOTE — CONSULTS
32.9*  --      --  203 255  --     < > = values in this interval not displayed. Recent Labs     02/20/20  2300  02/20/20  2348 02/22/20  0724 02/22/20  1021   *  --   --  150*  --    K 3.5  --   --  3.7  --    *  --   --  114*  --    CO2 22  --   --  20  --    BUN 30*  --   --  24*  --    CREATININE 1.12*   < > 1.2 1.05* 1.0   GLUCOSE 207*  --   --  110*  --     < > = values in this interval not displayed. MV Settings:           ABGs:   Recent Labs     02/20/20  2309 02/22/20  1021   PHART 7.440 7.468*   ZWQ2BNE 32* 29*   PO2ART 62* 63*   OYY4QMU 21.9 20.9*   BEART -2 -3   W1EVANRO 93* 93*   ABN4GCA 23 22     O2 Device: Nasal cannula  O2 Flow Rate (L/min): 5 L/min  Lab Results   Component Value Date    LACTA 1.0 02/20/2020    LACTA 1.2 02/19/2020    LACTA 1.1 02/18/2020       Radiology  I personally reviewed imaging studies and chest x-ray shows increased congestion, with multilobar infiltrate, left-sided effusion stable, right-sided effusion slightly worse        Assessment, plan:   Patient is at risk due to    · Acute hypoxic respiratory failure secondary to volume overload, patient improved  · Recent pneumonia with complicated parapneumonic effusion, seems to be stable back on Zosyn, she was discharged on Augmentin   · Hypernatremia    Recommendation  · Gentle diuresis  · Monitor chest x-ray  · Continue Zosyn for now  · Watch sodium, increase free water intake,, continue D5 water for now  · Incentive spirometry if able to do    DW Dr Melody Telles          Thank you for consultation    Electronically signed by Elliot Bennett MD, [facility],  on 2/22/2020 at 2:28 PM

## 2020-02-22 NOTE — PROGRESS NOTES
thoracentesis and sent on PO Augmentin, who presented with:    Abdominal pain and diarrhea  - CT of abd/pelvis was negative for acute findings  - no diarrhea since admission     Acute hypoxic respiratory failure   - recently managed for multilobar pneumonia with probable aspiration and left parapneumonic effusion s/p thoracentesis   - CT chest showed small to moderate right pleural effusion, RLL atelectasis vs pneumonia and a large left pleural effusion with lingular, left lower lobe collapse  - on IV Zosyn  -  tachypneic overnight, hypoxic in the 80s on 4-5 liters of nasal canula, improved in the 93-94% range on 40% VM  - stat ABG showed mild respiratory alkalosis due to tachypnea  - portable CXR showed pulmonary edema, multifocal bilateral infiltrates and  pleural effusions  - IV lasix once  - pulmonology following    Hypertensive urgency  - resumed home meds  - IV hydralazine PRN    Acute / chronic diastolic HF  - resumed PO lasix    Leukocytosis   - monitor    Oral thrush  - Nystatin swish and swallow    Anemia   - monitor H/H    Hypernatremia   - worsening  - D5 infusion  - monitor     Seizure disorder  - continue home meds      Diet: DIET DYSPHAGIA SOFT AND BITE-SIZED;     Code Status: Full Code          Electronically signed by Apolonia Lan MD on 2/22/2020 at 10:38 AM

## 2020-02-22 NOTE — PROGRESS NOTES
Patient is having labored breathing, moaning and grabbing her stomach at times. Oxygen saturation 87-94% on 4L. Increased to 5 L. Respirations 40. BP elevated 213/53. IV hydralazine given. Message sent to Dr Melody Telles to come assess the patient. Electronically signed by Floyd Wilde on 2/22/2020 at 8:33 AM

## 2020-02-22 NOTE — PROGRESS NOTES
AROM (degrees)  LUE General AROM: Pt flexed shoulder to over 90° with tactile cues to initiate movment, pt not following commands. Left Hand PROM (degrees)  Left Hand PROM: WFL  Left Hand AROM (degrees)  Left Hand General AROM: pt not following commands   RUE PROM (degrees)  RUE PROM: WFL  RUE AROM (degrees)  RUE General AROM: Pt flexed shoulder to over 90° with tactile cues to initiate movment, pt not following commands. Right Hand PROM (degrees)  Right Hand PROM: WFL  Right Hand AROM (degrees)  Right Hand General AROM: Pt with changes in R thumb, pt not following commands     Strength:  LUE Strength  L Hand General: 3/5  LUE Strength Comment: 3/5 grossly assessed   RUE Strength  R Hand General: 3/5  RUE Strength Comment: 3/5 grossly assessed      Coordination, Tone, Quality of Movement: Tone RUE  RUE Tone: Normotonic  Tone LUE  LUE Tone: Normotonic  Coordination  Movements Are Fluid And Coordinated: No  Coordination and Movement description: Fine motor impairments, Decreased speed, Right UE, Left UE, Decreased accuracy    Hand Dominance:  Hand Dominance  Hand Dominance: (unable to assess )    ADL Status:  ADL  Feeding: Minimal assistance(anticipated, Min tactile cues to bring hand to mouth )  Grooming: Maximum assistance  UE Bathing: Maximum assistance  LE Bathing: Dependent/Total  UE Dressing: Maximum assistance  LE Dressing: Dependent/Total  Toileting: Dependent/Total  Additional Comments: Pt used BSC, had small BM. Other ADL's simulated or use of clinincal judgment.            Therapy key for assistance levels -   Independent = Pt. is able to perform task with no assistance but may require a device   Stand by assistance = Pt. does not perform task at an independent level but does not need physical assistance, requires verbal cues  Minimal, Moderate, Maximal Assistance = Pt. requires physical assistance (25%, 50%, 75% assist from helper) for task but is able to actively participate in task   Dependent = Pt. requires total assistance with task and is not able to actively participate with task completion     Functional Mobility:  Functional Mobility  Functional - Mobility Device: Other  Activity: Other(BSC)  Assist Level: Maximum assistance  Functional Mobility Comments: Mod BSC Max to return to bed, recommend 2+ for safety        Bed Mobility  Bed mobility  Supine to Sit: Contact guard assistance  Sit to Supine: Moderate assistance    Seated and Standing Balance:  Balance  Sitting Balance: Stand by assistance  Standing Balance: Moderate assistance    Functional Endurance:  Activity Tolerance  Activity Tolerance: Treatment limited secondary to decreased cognition    D/C Recommendations:  OT D/C RECOMMENDATIONS  REQUIRES OT FOLLOW UP: Yes    Equipment Recommendations:  OT Equipment Recommendations  Other: Continue to assess     OT Education:   OT Education  OT Education: OT Role    OT Follow Up:  OT D/C RECOMMENDATIONS  REQUIRES OT FOLLOW UP: Yes       Assessment/Discharge Disposition:  Assessment: Pt is an 80 y.o. female with intellectual disability with above mentioned deficits. Pt may benefit from OT services to address deficits and increase pt ability to participate in self care tasks.    Performance deficits / Impairments: Decreased functional mobility , Decreased ADL status, Decreased strength, Decreased coordination, Decreased fine motor control, Decreased endurance, Decreased balance  Discharge Recommendations: Continue to assess pending progress  Decision Making: High Complexity  History: Multi comorb  Exam: 7 perf imp   Assistance / Modification: Max A     Six Click Score   How much help for putting on and taking off regular lower body clothing?: Total  How much help for Bathing?: A Lot  How much help for Toileting?: Total  How much help for putting on and taking off regular upper body clothing?: A Lot  How much help for taking care of personal grooming?: A Lot  How much help for eating meals?: A Little  AM-PAC Inpatient Daily Activity Raw Score: 11  AM-PAC Inpatient ADL T-Scale Score : 29.04  ADL Inpatient CMS 0-100% Score: 70.42    Plan:  Plan  Times per week: 1-3  Plan weeks: LOS   Current Treatment Recommendations: Strengthening, Functional Mobility Training, Self-Care / ADL, Balance Training    Goals:   Patient will:    - Improve functional endurance to tolerate/complete 5-8 mins of ADL's  - Be Mod A in UB ADLs   - Be Max A in LB ADLs  - Be Min A in ADL transfers without LOB  - Be Max A  in toileting tasks  - Access appropriate D/C site with as few architectural barriers as possible. Patient Goal:    Not stated      Therapy Time:   OT Individual Minutes  Time In: 3192  Time Out: 1410  Minutes: 15    Eval: 15 minutes     Electronically signed by:     CHRISTOPHER Enrique  2/22/2020, 2:30 PM

## 2020-02-22 NOTE — PROGRESS NOTES
Home Exercise Program, Safety Education & Training, Transfer Training, Balance Training, Endurance Training, Positioning, Equipment Evaluation, Education, & procurement  Safety Devices  Type of devices: All fall risk precautions in place    Goals:  Patient goals : unable to state  Long term goals  Long term goal 1: bed mobility with SBA  Long term goal 2: functional transfers with SBA  Long term goal 3: stand with Min A x 60 seconds to allow for toileting    AMPAC (6 CLICK) NareshAnahi Josiah Barton 28 Inpatient Mobility Raw Score : 18     Therapy Time:   Individual   Time In [facility]   Time Out 1413   Minutes Jessy Barros, [place], 02/22/20 at 2:28 PM         Definitions for assistance levels  Independent = pt does not require any physical supervision or assistance from another person for activity completion. Device may be needed.   Stand by assistance = pt requires verbal cues or instructions from another person, close to but not touching, to perform the activity  Minimal assistance= pt performs 75% or more of the activity; assistance is required to complete the activity  Moderate assistance= pt performs 50% of the activity; assistance is required to complete the activity  Maximal assistance = pt performs 25% of the activity; assistance is required to complete the activity  Dependent = pt requires total physical assistance to accomplish the task

## 2020-02-23 NOTE — PROGRESS NOTES
Hospitalist Progress Note      PCP: Gonzalo Jose MD    Date of Admission: 2/20/2020    Chief Complaint:  No acute events, afebrile, stable HD, breathing better today    Medications:  Reviewed    Infusion Medications    dextrose 50 mL/hr at 02/23/20 5058     Scheduled Medications    [START ON 2/24/2020] enoxaparin  40 mg Subcutaneous Daily    amLODIPine  10 mg Oral Daily    aspirin  81 mg Oral Daily    carvedilol  12.5 mg Oral BID WC    furosemide  40 mg Oral Daily    isosorbide mononitrate  30 mg Oral Daily    levETIRAcetam  500 mg Oral BID    phenytoin  100 mg Oral BID    sertraline  50 mg Oral Daily    spironolactone  25 mg Oral Daily    sodium chloride flush  10 mL Intravenous 2 times per day    pantoprazole  40 mg Intravenous Daily    And    sodium chloride (PF)  10 mL Intravenous Daily    pill splitter   Does not apply Once    nystatin  5 mL Oral 4x Daily    piperacillin-tazobactam  3.375 g Intravenous Q8H     PRN Meds: sodium chloride flush, acetaminophen, acetaminophen, polyethylene glycol, promethazine, ondansetron, haloperidol lactate, hydrALAZINE      Intake/Output Summary (Last 24 hours) at 2/23/2020 1047  Last data filed at 2/23/2020 0702  Gross per 24 hour   Intake 1275 ml   Output 1200 ml   Net 75 ml       Exam:    BP (!) 196/58   Pulse 61   Temp 97.3 °F (36.3 °C) (Oral)   Resp 18   Ht 5' 3\" (1.6 m)   Wt 145 lb (65.8 kg)   SpO2 94%   BMI 25.69 kg/m²     General appearance: awake, minimally verbal due to MRDD  Respiratory: diminished BS bilaterally . Cardiovascular: Regular rate and rhythm with normal S1/S2. Abdomen: Soft, active bowel sounds. Musculoskeletal: No edema bilaterally.      Labs:   Recent Labs     02/21/20  0621 02/22/20  0724 02/22/20  1021 02/23/20  0928   WBC 6.3 11.1*  --  6.5   HGB 8.7* 10.8* 10.1* 9.4*   HCT 26.4* 32.9*  --  29.4*    255  --  184     Recent Labs     02/20/20  2300  02/22/20  0724 02/22/20  1021 02/23/20  0613   *  --  150*  -- 147*   K 3.5  --  3.7  --  3.8   *  --  114*  --  113*   CO2 22  --  20  --  18*   BUN 30*  --  24*  --  22   CREATININE 1.12*   < > 1.05* 1.0 1.02*   CALCIUM 8.6  --  8.8  --  8.0*    < > = values in this interval not displayed. Recent Labs     02/20/20  2300   AST 19   ALT 27   BILITOT 0.3   ALKPHOS 94     No results for input(s): INR in the last 72 hours. No results for input(s): [place] in the last 72 hours. Urinalysis:      Lab Results   Component Value Date    NITRU Negative 02/20/2020    WBCUA 3-5 02/21/2020    BACTERIA Negative 02/21/2020    RBCUA 0-2 02/21/2020    BLOODU TRACE 02/20/2020    SPECGRAV 1.045 02/20/2020    GLUCOSEU Negative 02/20/2020       Radiology:  XR CHEST PORTABLE   Final Result   Impression:  Multifocal bilateral infiltrate/pneumonia right upper lobe and bilateral lower lobes with underlying pulmonary edema and bilateral pleural effusions. No significant change compared with previous study. XR CHEST PORTABLE   Final Result   Impression:     1. Stable, enlarged cardiomediastinal silhouette with underlying pulmonary edema. 2. Multifocal bilateral infiltrate/pneumonia with bilateral pleural effusions. 3. Possible mass right upper lobe. Please see CT chest report dated 2/20/2024 further details. CT ABDOMEN PELVIS W IV CONTRAST Additional Contrast? None   Final Result      No acute intra-abdominal process. Colonic diverticulosis without diverticulitis. All CT scans at this facility use dose modulation, iterative reconstruction, and/or weight based dosing when appropriate to reduce radiation dose to as low as reasonably achievable. CT CHEST W CONTRAST   Final Result      Small to moderate right pleural effusion. Right lower lobe atelectasis/pneumonia. Large left pleural effusion with lingular, left lower lobe collapse.       Follow-up imaging following treatment recommended to demonstrate resolution of findings, and rule out underlying malignancy. Right lobe/isthmus thyroid nodule. Thyroid sonography recommended. Low-density. Remote compression fracture T12. All CT scans at this facility use dose modulation, iterative reconstruction, and/or weight based dosing when appropriate to reduce radiation dose to as low as reasonably achievable. Assessment/Plan:    80 y.o. female with history of HTN, diastolic HF, SSS s/p PPM, mental retardation, seizures, just discharged on 2/19 after being managed for acute hypoxemic resp failure due to multilobar pneumonia, chemical pneumonitis secondary to aspiration and left parapneumonic effusion s/p thoracentesis and sent on PO Augmentin, who presented with:    Abdominal pain and diarrhea  - CT of abd/pelvis was negative for acute findings  - no diarrhea since admission     Acute hypoxic respiratory failure   - recently managed for multilobar pneumonia with probable aspiration and left parapneumonic effusion s/p thoracentesis   - CT chest showed small to moderate right pleural effusion, RLL atelectasis vs pneumonia and a large left pleural effusion with lingular, left lower lobe collapse  - on IV Zosyn  -  tachypneic overnight, hypoxic in the 80s on 4-5 liters of nasal canula, improved in the 93-94% range on 40% VM  - portable CXR showed pulmonary edema, multifocal bilateral infiltrates and  pleural effusions  - improved with IV lasix  - pulmonology following    Hypertensive urgency  - resumed home meds  - IV hydralazine PRN    Acute / chronic diastolic HF  - IV lasix once, resumed PO lasix    Leukocytosis   - improved, monitor    Oral thrush  - Nystatin swish and swallow    Anemia   - monitor H/H    Hypernatremia   - improving with D5 infusion  - monitor     Seizure disorder  - continue home meds      Diet: DIET DYSPHAGIA SOFT AND BITE-SIZED;     Code Status: Full Code          Electronically signed by Apolonia Lan MD on 2/23/2020 at 10:47 AM

## 2020-02-23 NOTE — PROGRESS NOTES
Ill-defined area increased opacity right upper lobe marginated by the minor fissure, with ill-defined area increased opacity also found in right lower lung. Right diaphragm elevated. Lung shows blunting costophrenic angle with ill-defined areas increase opacity left midlung zone and left lower lung. CONGESTIVE HEART FAILURE. Ct Chest Wo Contrast    Result Date: 2/14/2020  EXAMINATION:  CT SCAN CHEST CLINICAL HISTORY:  Abnormal chest x-ray COMPARISON:  None TECHNIQUE:  Multiple serial axial images of the chest from the base neck through the upper abdomen with both sagittal coronal reconstruction was performed without intravenous or oral administration of contrast. FINDINGS:  There is an area of infiltrate consolidation/mass extending from the right infrahilar region out to the base, pleura of the right upper lobe There is an area of atelectasis, patchy to glass and groundglass infiltrate right lower lobe. There is an area of infiltrate, dense consolidation at the base left upper lobe left lingular region. Underlying mass not excluded. There is an area of infiltrate consolidation left lower lobe with air bronchograms with moderate to large left pleural effusion. No significant periaortic. Left perihilar adenopathy. Adenopathy. There is subcarinal adenopathy. Trace pericardial effusion. In the field-of-view of the abdomen there is a moderate hiatal hernia. There is a partially visualized cystic area on the right. Exophytic cyst at the superior pole the right kidney is seen by CT scan January 11, 2019. There is multilevel degenerative changes supraspinatus upon a moderate dorsal kyphosis. There is a old compression fracture of T12 again noted. THERE IS AN AREA OF INFILTRATE CONSOLIDATION/MASS EXTENDING FROM THE RIGHT INFRAHILAR REGION OUT TO THE BASE, PLEURA OF THE RIGHT UPPER LOBE. MALIGNANCY IS NOT EXCLUDED. ATELECTASIS, PATCHY TO COALESCENT GLASS AND GROUNDGLASS INFILTRATE RIGHT LOWER LOBE.  INFILTRATE, improved    Recommendation  · Can transition to oral Augmentin, complete 10 days of treatment including prior admission treatment  · Continue Lasix  · Watch electrolyte  · Watch renal function  · Incentive spirometry if able to perform  · Vest 3 times daily  · Chest x-ray tomorrow     Electronically signed by Maximo Phillip MD,  [facility]   on 2/23/2020 at 1:46 PM

## 2020-02-23 NOTE — PROGRESS NOTES
Renal Monitoring based on    Recent Labs     02/23/20  0613   CREATININE 1.02*    Estimated Creatinine Clearance: 35 mL/min (A) (based on SCr of 1.02 mg/dL (H)). Pharmacy Dose Adjustment Per Protocol    Sheila Wright is a 80 y.o. female. Recent Labs     02/22/20  0724 02/23/20  0613   BUN 24* 22       Recent Labs     02/22/20  1021 02/23/20  0613   CREATININE 1.0 1.02*       Estimated Creatinine Clearance: 35 mL/min (A) (based on SCr of 1.02 mg/dL (H)). Height:   Ht Readings from Last 1 Encounters:   02/20/20 5' 3\" (1.6 m)     Weight:  Wt Readings from Last 1 Encounters:   02/20/20 145 lb (65.8 kg)         Drug Ordered Therapeutic Interchange (CrCL ? 30 mL/min)   [x] Enoxaparin 40 mg subq daily Enoxaparin 30 mg subq daily   [] Enoxaparin 1 mg/kg subq BID Enoxaparin ________ (1 mg/kg) subq daily    [] Enoxaparin 30 subq BID Enoxaparin 30 mg subq daily      Will change Lovenox back to original dose ordered of 40 mg based on CrCl maintaining above 30 mL/min limit. PACO Cotto. Ph.  2/23/2020  8:23 AM

## 2020-02-24 NOTE — PROGRESS NOTES
Nutrition Assessment    Type and Reason for Visit: Initial, Consult(Poor intake)    Nutrition Recommendations: Continue current diet, Start ONS(Dysphagia soft and bite sized diet; standard high calorie ONS, fortified pudding, frozen ONS each once daily)    Nutrition Assessment: UTD nutrition status pta as pt was sleeping at multiple attempted visits. Pt has been eating poorly since admission per notes. Pt admitted for ABD pain with respiratory failure, CHF, CKD3, and mental retardation. Poor historian per notes, lives in a group home. Will start ONS. Malnutrition Assessment:  · Malnutrition Status: Insufficient data  · Context: Chronic illness  · Findings of the 6 clinical characteristics of malnutrition (Minimum of 2 out of 6 clinical characteristics is required to make the diagnosis of moderate or severe Protein Calorie Malnutrition based on AND/ASPEN Guidelines):  1. Energy Intake-Less than or equal to 50% of estimated energy requirement, (3 days)    2. Weight Loss-Unable to assess(No weight history),    3. Fat Loss-Unable to assess,    4. Muscle Loss-Unable to assess(Unable to complete NFPE with pt),    5. Fluid Accumulation-Mild fluid accumulation, Generalized  6.  Strength-Not measured    Nutrition Risk Level: High    Nutrient Needs:  · Estimated Daily Total Kcal: 1374-4465 (25-28 kcals/kg)  · Estimated Daily Protein (g): 57-68 (1-1.2 g/kg)  · Estimated Daily Total Fluid (ml/day): ~1600 mL (1 mL/kcal)    Nutrition Diagnosis:   · Problem: Inadequate oral intake  · Etiology: related to Pain, Other (Comment)(acute illness)     Signs and symptoms:  as evidenced by Intake 0-25%    Objective Information:  · Nutrition-Focused Physical Findings: Pt able to feed self with supervision. Trace generalized edema present. Last BM 2/22.   · Wound Type: None  · Current Nutrition Therapies:  · Oral Diet Orders: Dysphagia  Soft and Bite-Sized (Dysphagia 3)   · Oral Diet intake: 1-25%, 26-50%(10% 2/24 breakfast and lunch)  · Oral Nutrition Supplement (ONS) Orders: None  · Anthropometric Measures:  · Ht: 5' 3\" (160 cm)   · Current Body Wt: (UTD- multiple extra blankets on bed)  · Admission Body Wt: 145 lb (65.8 kg)(2/20 Estimated)  · Usual Body Wt: 126 lb (57.2 kg)(10/31 Bed scale)  · % Weight Change:  ,  UTD- lack of weight history  · Ideal Body Wt: 115 lb (52.2 kg), % Ideal Body >100%  · BMI Classification: BMI 18.5 - 24.9 Normal Weight(Using UBW: 22.3)    Nutrition Interventions:   Continue current diet, Start ONS(Dysphagia soft and bite sized diet; standard high calorie ONS, fortified pudding, frozen ONS each once daily)  Continued Inpatient Monitoring, Education not appropriate at this time    Nutrition Evaluation:   · Evaluation: Goals set   · Goals: PO intake >50% of meals, 100% of ONS. Obtain accurate current weight.     · Monitoring: Meal Intake, Supplement Intake, Weight, Pertinent Labs, Chewing/Swallowing      Electronically signed by Meryle Lam, RD, LD on 2/24/20 at 2:00 PM

## 2020-02-24 NOTE — PROGRESS NOTES
Assumed care of patient tonight. Assessment complete and meds were given VS stable. Patients lung sounds diminished. Patient has not complained of any abdominal pain and has not had a bowel movement on my shift. Being turned every 2 hours. avasys in room for patient safety. Patient on 2L of oxygen and wears none at home. Denies any needs will continue to monitor.  Electronically signed by Vero Subramanian RN on 2/24/2020 at 12:37 AM

## 2020-02-24 NOTE — PROGRESS NOTES
INPATIENT PROGRESS NOTES    PATIENT NAME: Natali Hutton  MRN: [id number]  SERVICE DATE:  February 24, 2020   SERVICE TIME:  4:39 PM      PRIMARY SERVICE: Pulmonary Disease    CHIEF COMPLAIN: Pneumonia  INTERVAL HPI: Patient seen and examined at bedside, Interval Notes, orders reviewed. Nursing notes noted  Patient is comfortable in bed. She is alert awake. No complaint of shortness of breath. She is on 2 L O2 via nasal cannula O2 saturation is 99%. OBJECTIVE    Body mass index is 25.69 kg/m². PHYSICAL EXAM:  Vitals:  BP (!) 124/42   Pulse 63   Temp 97.2 °F (36.2 °C)   Resp 18   Ht 5' 3\" (1.6 m)   Wt 145 lb (65.8 kg)   SpO2 99%   BMI 25.69 kg/m²   General:  Alert, awake . comfortable in bed, No distress. appears stated age and cooperative  Head: Atraumatic , Normocephalic   Eyes: PERRL. No sclera icterus. No conjunctival injection. No discharge   ENT: No nasal  discharge. Pharynx clear. lips, teeth, mucosa and gums are normal, tongue protrudes in the midline  Neck:  Trachea midline. No thyromegaly, no JVD, No cervical adenopathy. Chest : Bilaterally symmetrical ,Normal effort,  No accessory muscle use  Lung : . Fair BS bilateral, decreased BS at bases. Bibasilar Rales. No wheezing. No rhonchi. No dullness on percussion. Heart[de-identified] Normal  rate. Regular rhythm. No mumur ,  Rub or gallop  ABD: Non-tender. Non-distended. No masses. No organmegaly. Normal bowel sounds. No hernia.   Ext : No Pitting both leg , No Cyanosis No clubbing  Neuro: no focal weakness          DATA:   Recent Labs     02/23/20  0928 02/24/20  0558   WBC 6.5 6.9   HGB 9.4* 9.2*   HCT 29.4* 28.0*   MCV 94.3 91.6    179     Recent Labs     02/23/20  0613 02/24/20  0558   * 146*   K 3.8 3.3*   * 110*   CO2 18* 22   BUN 22 21   CREATININE 1.02* 1.02*   GLUCOSE 109* 123*   CALCIUM 8.0* 8.0*   LABGLOM 51.0* 51.0*   GFRAA >60.0 >60.0       MV Settings:          Recent Labs     02/22/20  1021   PHART 7.468*   OMQ1IHF 29* superimposed on the upper left hemithorax with electrodes in the region of the RA and RV. There is cardiomegaly. There is atherosclerotic calcification and tortuosity of the aorta. No significant clearing of  the bilateral pulmonary infiltrates and bilateral pleural effusions since yesterday's chest x-ray. Osteopenia and degenerative bone spurring in the spine is once again noted. NO SIGNIFICANT CLEARING OF THE BILATERAL PULMONARY INFILTRATES AND BILATERAL PLEURAL EFFUSIONS SINCE YESTERDAY'S CHEST X-RAY. Xr Chest Standard (2 Vw)    Result Date: 2/16/2020  EXAMINATION: Chest x-ray, 2 view HISTORY: Pneumonia TECHNIQUE: Frontal and lateral views of the chest COMPARISON: CT chest from February 14, 2020 FINDINGS: Left-sided pacemaker is present. Atherosclerotic calcification of the thoracic aorta. Left heart border is obscured by moderate left pleural effusion and left lung base consolidations. Right upper lobe consolidation is not significantly changed. Pneumothorax. No acute osseous abnormality. No significant interval change of right upper lobe consolidation and moderate left pleural effusion and left lung base consolidations. Xr Chest Standard (2 Vw)    Result Date: 2/14/2020  EXAMINATION: XR CHEST (2 VW) CLINICAL HISTORY: COUGH, NAUSEA, VOMITING FOR 2 WEEKS COMPARISONS: AUGUST 9 2016 FINDINGS: Pacemaker generator and wires unchanged. Osteopenia. Low lung volumes. Cardiac size normal. Pulmonary vasculature is distinct. Ill-defined area increased opacity right upper lobe marginated by the minor fissure, with ill-defined area increased opacity also found in right lower lung. Right diaphragm elevated. Lung shows blunting costophrenic angle with ill-defined areas increase opacity left midlung zone and left lower lung. CONGESTIVE HEART FAILURE.     Ct Chest Wo Contrast    Result Date: 2/14/2020  EXAMINATION:  CT SCAN CHEST CLINICAL HISTORY:  Abnormal chest x-ray COMPARISON:  None TECHNIQUE:  Multiple serial axial images of the chest from the base neck through the upper abdomen with both sagittal coronal reconstruction was performed without intravenous or oral administration of contrast. FINDINGS:  There is an area of infiltrate consolidation/mass extending from the right infrahilar region out to the base, pleura of the right upper lobe There is an area of atelectasis, patchy to glass and groundglass infiltrate right lower lobe. There is an area of infiltrate, dense consolidation at the base left upper lobe left lingular region. Underlying mass not excluded. There is an area of infiltrate consolidation left lower lobe with air bronchograms with moderate to large left pleural effusion. No significant periaortic. Left perihilar adenopathy. Adenopathy. There is subcarinal adenopathy. Trace pericardial effusion. In the field-of-view of the abdomen there is a moderate hiatal hernia. There is a partially visualized cystic area on the right. Exophytic cyst at the superior pole the right kidney is seen by CT scan January 11, 2019. There is multilevel degenerative changes supraspinatus upon a moderate dorsal kyphosis. There is a old compression fracture of T12 again noted. THERE IS AN AREA OF INFILTRATE CONSOLIDATION/MASS EXTENDING FROM THE RIGHT INFRAHILAR REGION OUT TO THE BASE, PLEURA OF THE RIGHT UPPER LOBE. MALIGNANCY IS NOT EXCLUDED. ATELECTASIS, PATCHY TO COALESCENT GLASS AND GROUNDGLASS INFILTRATE RIGHT LOWER LOBE. INFILTRATE, DENSE CONSOLIDATION AT THE BASE LEFT UPPER LOBE LEFT LINGULAR. UNDERLYING MASS NOT EXCLUDED REGION. AREA OF INFILTRATE CONSOLIDATION LEFT LOWER LOBE WITH MODERATE TO LARGE LEFT PLEURAL EFFUSION. OTHER FINDINGS DETAILED ABOVE All CT scans at this facility use dose modulation, iterative reconstruction, and/or weight based dosing when appropriate to reduce radiation dose to as low as reasonably achievable.     Ct Chest W Contrast    Result Date: 2/21/2020  CT of the Chest with intravenous Recent pneumonia. Technique: Multiple contiguous axial images were obtained of the abdomen and pelvis from an level of the lung bases through the ischial tuberosities with contrast. Multiplanar reformats were obtained. Delayed images were obtained. Comparison: CT abdomen pelvis from January 11, 2019 Findings: Please see CT chest regarding chest findings. The liver, gallbladder, spleen, stomach, pancreas, and adrenal glands are within normal limits. The kidneys enhance uniformly. Multiple bilateral renal cysts are identified and are not significantly changed from prior examination. No urinary tract calculi or hydronephrosis. Urinary bladder is well distended. The uterus is surgically absent. Abdominal aorta is nonaneurysmal  and demonstrates atherosclerotic calcification . No retroperitoneal or abdominal/pelvic lymphadenopathy. No small bowel obstruction. Sigmoid colon diverticuli are identified. No overt colonic mass or pericolonic inflammation. Appendix is not visualized. No free fluid or free air. Chronic severe compression deformity of T12. Degenerative changes of the lumbar spine. No acute intra-abdominal process. Colonic diverticulosis without diverticulitis. All CT scans at this facility use dose modulation, iterative reconstruction, and/or weight based dosing when appropriate to reduce radiation dose to as low as reasonably achievable. Xr Chest Portable    Result Date: 2/24/2020  XR CHEST PORTABLE : 2/24/2020 CLINICAL HISTORY: Shortness of breath. Evaluate pulmonary infiltrates and pleural effusions. COMPARISON: 2/23/2020. TECHNIQUE: A portable upright AP radiograph of the chest was obtained. FINDINGS: Shallow inspiratory volumes with a moderate-sized left and small right pleural effusions, and mild to moderate bilateral pulmonary infiltrates have not significantly changed, given differences in technique and positioning.  There is no increasing cardiomegaly, worsening vascular congestion, pneumothorax, protocol, patient and site verification was performed with a \"timeout\" prior to the procedure. Brief survey of the patient's left hemithorax demonstrate moderate amount of pleural effusion. After selection of an appropriate site for thoracentesis, the thoracic skin was prepped and draped in usual sterile fashion. Under ultrasound guidance, using lidocaine for local anesthesia, a 19-gauge Yueh catheter was inserted in the pleural cavity until effusion was aspirated. Using Vacutainer bottles, 50 mL of clear yellow effusion was drained. Due to loculations, no further fluid was able to be aspirated. The catheter was removed and the aspiration site dressed. The patient tolerated procedure well. No immediate complications were identified. Subsequent chest radiograph demonstrated no evidence of pneumothorax. The patient left the radiology department in stable condition. Radiology nurse monitored patient's  vital signs throughout the procedure which lasted approximately 30 minutes. Fl Modified Barium Swallow W Video    Result Date: 2/18/2020  MODIFIED BARIUM SWALLOW CLINICAL DATA: DYSPHAGIA. COMPARISON: NONE. TECHNIQUE: A total of 24 dynamic series images over the course of 1.8 minutes were obtained. FINDINGS: In cooperation with speech pathology, a modified barium swallow using various consistencies with both solids and liquids with dynamic imaging was performed. Patient's oral stage was characterized by mild to moderate oral dysphagia. There is decreased lingual coordination and incomplete dentition resulting decreased in bolus control. There is premature entry to the level of the pharynx. Mild lingual residuals  are noted. Patient's pharyngeal stage was characterized by mild pharyngeal dysphagia. There are mild residuals to level of vallecula and pyriformis. No penetration or aspiration. NO PENETRATION OR ASPIRATION NOTED. RECOMMENDATION BY SPEECH PATHOLOGY TO FOLLOW.         IMPRESSION AND

## 2020-02-24 NOTE — PLAN OF CARE
Nutrition Problem: Inadequate oral intake  Intervention: Food and/or Nutrient Delivery: Continue current diet, Start ONS(Dysphagia soft and bite sized diet; standard high calorie ONS, fortified pudding, frozen ONS each once daily)  Nutritional Goals: PO intake >50% of meals, 100% of ONS. Obtain accurate current weight.

## 2020-02-24 NOTE — PROGRESS NOTES
Physical Therapy Med Surg Daily Treatment Note  Facility/Department: [facility]  Room: [place] [id number]       NAME: Robert Camacho  : 1931 (80 y.o.)  MRN: [id number]  CODE STATUS: Full Code    Date of Service: 2020    Patient Diagnosis(es): Abdominal pain [R10.9]  Pneumonia [J18.9]   Chief Complaint   Patient presents with    Abdominal Pain     Patient Active Problem List    Diagnosis Date Noted    Pneumonia of both lower lobes due to infectious organism [facility])      Priority: High    Abdominal pain 2020    Chemical pneumonitis ([address] 2020    Dysphagia 2020    Acute on chronic respiratory failure with hypoxia ([address] 2020    Dehydration 2020    Pneumonia 2020    Sepsis ([address] 10/31/2019    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    History of recurrent UTI (urinary tract infection) 2018    Elevated phenytoin level 2018    Aortic regurgitation 2018    CKD (chronic kidney disease), stage III ([address] 2018    Atelectasis, bilateral 2018    Observation for suspected concussion 2018    HB (heart block) 2018    Concussion syndrome 2018    Numbness and tingling of left leg 2017    Mental retardation 2017    Seizure disorder ([address] 2017    Essential hypertension 2017    Unstable gait 2016    Dizzy spells 2016    SOB (shortness of breath) 2016    Chronic CHF ([address] 2016    Mental retardation 2016    Hypoxia 2016    Developmental disability 2016    HTN (hypertension) 2016    Hypokalemia 2016    Generalized seizures ([address] 2016        Past Medical History:   Diagnosis Date    CHF (congestive heart failure) ([address]     CKD (chronic kidney disease), stage III ([address] 2018    Depression     Dizzy spells 2016    HTN (hypertension) 2016    Hyperlipidemia     Hypokalemia 2016    Hypoxia 2016    Mental retardation bed mobility with SBA  Long term goal 2: functional transfers with SBA  Long term goal 3: stand with Min A x 60 seconds to allow for toileting  Patient Goals   Patient goals : unable to state    PLAN    Safety Devices  Type of devices: All fall risk precautions in place; Bed alarm in place;Call light within reach; Telesitter in use;Left in bed     [facility] (6 CLICK) Elvia Barton 28 Inpatient Mobility Raw Score : 12     Therapy Time   Individual   Time In 0957   Time Out 1015   Minutes 18      Bed Mobility/Transfers: 18 min        Carvin Libman  02/24/20 at 10:24 AM         Definitions for assistance levels  Independent = pt does not require any physical supervision or assistance from another person for activity completion. Device may be needed.   Stand by assistance = pt requires verbal cues or instructions from another person, close to but not touching, to perform the activity  Minimal assistance= pt performs 75% or more of the activity; assistance is required to complete the activity  Moderate assistance= pt performs 50% of the activity; assistance is required to complete the activity  Maximal assistance = pt performs 25% of the activity; assistance is required to complete the activity  Dependent = pt requires total physical assistance to accomplish the task

## 2020-02-25 NOTE — PROGRESS NOTES
Department of Internal Medicine  Progress Note      SUBJECTIVE: No acute events, afebrile, stable HD, breathing better today  No acute events overnight.        ROS:  All 12 systems reviewed and negative except mentioned in HPI and Assessment and plan    MEDICATIONS:  Current Facility-Administered Medications   Medication Dose Route Frequency Provider Last Rate Last Dose    hydrALAZINE (APRESOLINE) injection 10 mg  10 mg Intravenous Q4H PRN Cici Fraga MD   10 mg at 02/24/20 2155    spironolactone (ALDACTONE) tablet 25 mg  25 mg Oral BID Cici Miranda MD   25 mg at 02/25/20 0847    isosorbide mononitrate (IMDUR) extended release tablet 60 mg  60 mg Oral Daily Cici Miranda MD   60 mg at 02/25/20 0846    pantoprazole (PROTONIX) tablet 40 mg  40 mg Oral QAM AC Cici Miranda MD   40 mg at 02/25/20 0557    enoxaparin (LOVENOX) injection 40 mg  40 mg Subcutaneous Daily Burnell Severance, MD   40 mg at 02/25/20 0847    amLODIPine (NORVASC) tablet 10 mg  10 mg Oral Daily Burnell Severance, MD   10 mg at 02/25/20 0847    aspirin EC tablet 81 mg  81 mg Oral Daily Burnell Severance, MD   81 mg at 02/25/20 0846    carvedilol (COREG) tablet 12.5 mg  12.5 mg Oral BID WC Burnell Severance, MD   12.5 mg at 02/25/20 0847    furosemide (LASIX) tablet 40 mg  40 mg Oral Daily Burnell Severance, MD   40 mg at 02/25/20 0847    levETIRAcetam (KEPPRA) tablet 500 mg  500 mg Oral BID Burnell Severance, MD   500 mg at 02/25/20 0846    phenytoin (DILANTIN) ER capsule 100 mg  100 mg Oral BID Burnell Severance, MD   100 mg at 02/25/20 0847    sertraline (ZOLOFT) tablet 50 mg  50 mg Oral Daily Burnell Severance, MD   50 mg at 02/25/20 0847    sodium chloride flush 0.9 % injection 10 mL  10 mL Intravenous 2 times per day HOMER Negrete - CNP   10 mL at 02/25/20 0847    sodium chloride flush 0.9 % injection 10 mL  10 mL Intravenous PRN HOMER Negrete - LISSET        acetaminophen (TYLENOL) tablet 650 mg  650 mg Oral Q6H PRN Dalia Larson, HOMER - LISSET 3. 3* 3.6   * 110* 108*   CO2 18* 22 23   BUN 22 21 18   CREATININE 1.02* 1.02* 1.06*   GLUCOSE 109* 123* 114*   CALCIUM 8.0* 8.0* 8.3*       Recent Labs     02/24/20  0558   MG 2.3           ASSESSMENT AND PLAN    Active Hospital Problems    Diagnosis Date Noted    Abdominal pain [R10.9] 02/21/2020    Chemical pneumonitis ([address] [J68.0] 02/21/2020    Dysphagia [R13.10] 02/21/2020    Acute on chronic respiratory failure with hypoxia (HCC) [J96.21] 02/21/2020    Dehydration [E86.0] 02/21/2020    Pneumonia [J18.9] 02/14/2020     88 y. o. female with history of HTN, diastolic HF, SSS s/p PPM, mental retardation, seizures, just discharged on 2/19 after being managed for acute hypoxemic resp failure due to multilobar pneumonia, chemical pneumonitis secondary to aspiration and left parapneumonic effusion s/p thoracentesis and sent on PO Augmentin, who presented with abdominal pain and diarrhea. CT scan of the abdomen and pelvis on admission was negative for any acute findings. Patient has not had any diarrhea since admission.     Acute hypoxic respiratory failure: Resolved currently on 2 to 3 L nasal cannula with sats more than 95%. On IV Zosyn. Continue Lasix. Avoid volume overload     Hypertensive urgency: Blood pressure on higher side.   Increased dose of Imdur     Acute on chronic diastolic heart failure: Continue diuresis  Stable for discharge today    DVT prophylaxis: Lovenox  Neeraj Norman MD  Pager : [phone]

## 2020-02-25 NOTE — CARE COORDINATION
Does Patient Have a High-Risk for Readmission Diagnosis (CHF, PN, MI, COPD)? Yes    If Yes,     Consult with pulmonologist? No   Consult with cardiologist? No   Cardiac Rehab referral if EF <35%? No   Consult with Pharmacy for medication assessment prior to discharge? Yes   Consult with Behavioral health to aid in depression, anxiety, or coping issues? No   Palliative Care Consult? Yes   Pulmonary Rehab order for COPD, PN, and CHF (if EF > 35%)? No    Does patient have a reliable scale and know how to read it (for CHF)? N/A   Nutrition consult for CHF? N/A   Respiratory therapy consult that includes bedside instruction on administration of nebulizers and/or inhalers, and assessment of oxygen and equipment needs in the home?  Yes    Electronically by Erma Leal RN  on 2/21/2020 at 2:20 PM
Phone call to caregiver Ubaldo Janeth who confirmed pt return to her care. Transport scheduled for 3:30. Reivewed IMM.
Plan return to home with caregiver.
representative:  was provided with choice of any post-acute providers for care and equipment and agrees with discharge plan N/A    Dccop completed.      Electronically signed by Aezem Figueroa on 2/21/20 at 12:59 PM

## 2020-02-25 NOTE — PROGRESS NOTES
AM assessment completed. Patient is disoriented x4. Pt said she was having some back pain, medicated with tylenol. . Pt has been restless, trying to get out of bed , pulling at her IV and oxygen. Stated \"We don't let her do nothing. \"  Avasys still in room, fall precautions in place. Will continue to monitor.       E. Electronically signed by Barbara Martinez RN on 2/25/2020 at 12:22 PM

## 2020-02-27 NOTE — PROGRESS NOTES
Physical Therapy  Facility/Department: [facility] [id number]  Physical Therapy Discharge      NAME: Vernon Agudelo    : 1931 (80 y.o.)  MRN: [id number]    Account: [de-identified]  Gender: female      Patient has been discharged from acute care hospital. DC patient from current PT program.      Electronically signed by Vera Mason PT on 20 at 11:37 AM

## 2020-02-29 NOTE — DISCHARGE SUMMARY
Physician Discharge Summary     Patient ID:  Neal Quinn  [id number]  84 y.o.  5/9/1931    Admit date: 2/20/2020    Discharge date and time:2/25/2020  3:37 PM    Admitting Physician: Prema Arias MD     Discharge Physician: Meri Lorenz MD    Admission Diagnoses: Abdominal pain [R10.9]  Pneumonia [J18.9]    Discharge Diagnoses: Active Hospital Problems    Diagnosis Date Noted    Abdominal pain [R10.9] 02/21/2020    Chemical pneumonitis ([address] [J68.0] 02/21/2020    Dysphagia [R13.10] 02/21/2020    Acute on chronic respiratory failure with hypoxia (HCC) [J96.21] 02/21/2020    Dehydration [E86.0] 02/21/2020    Pneumonia [J18.9] 02/14/2020       Admission Condition: fair    Discharged Condition: good    Hospital Course: Patient was readmitted with aspiration pneumonia. Patient was treated with antibiotics. And treated for above conditions. The patient was discharged in stable condition    Inpatient meds:    Labs:  LABS  No results for input(s): WBC, RBC, HGB, HCT, MCV, MCH, MCHC, RDW, PLT, MPV in the last 72 hours. No results for input(s): NA, K, CL, CO2, BUN, CREATININE, GLUCOSE, CALCIUM in the last 72 hours. No results for input(s): MG in the last 72 hours. Imaging: Ct Chest W Contrast    Result Date: 2/21/2020  CT of the Chest with intravenous contrast medium History:  Hypoxia, abdominal pain, rule out dissection. Technical Factors: CT imaging of the chest was obtained and formatted as 5 mm contiguous axial images from the thoracic inlet through the adrenal glands. Sagittal, coronal reconstructions obtained during postprocessing. Intravenous contrast medium:  Isovue-370, 100 mL Comparison:  Chest radiograph, February 18, 2020, February 17, 2020. Findings: Right lung shows consolidation posterior segment right upper lobe. Small to moderate right pleural effusion. Partial consolidation right lower lobe. No nodules or masses.  Left lung shows large left pleural effusion, with left lower spironolactone (ALDACTONE) 25 MG tablet Take 1 tablet by mouth daily, Disp-90 tablet, R-3      phenytoin (DILANTIN) 100 MG ER capsule Take 100 mg by mouth 2 times daily Historical Med         STOP taking these medications       amoxicillin-clavulanate (AUGMENTIN) 875-125 MG per tablet Comments:   Reason for Stopping:             Activity: activity as tolerated  Diet: regular diet and diabetic diet ( if indicated)    I spent more than 30 minutes talking to the patient, family and the nurse and preparing the discharge      Signed:  Arjun Rodriguez MD  Pager : [phone]  2/29/2020  2:15 PM

## 2020-03-04 NOTE — PROGRESS NOTES
referred to palliative care by primary care physician. I offered a course of antibiotic therapy with follow-up visit here in 3 weeks to discuss the options further as she wants to talk to her  about all of this. Plan:     No orders of the defined types were placed in this encounter. Orders Placed This Encounter   Medications    amoxicillin-clavulanate (AUGMENTIN) 875-125 MG per tablet     Sig: Take 1 tablet by mouth 2 times daily for 14 days     Dispense:  28 tablet     Refill:  0           Return in about 3 weeks (around 3/25/2020) for re-evaluation.        Karen Graff MD

## 2020-03-22 PROBLEM — E86.0 DEHYDRATION: Status: RESOLVED | Noted: 2020-01-01 | Resolved: 2020-01-01

## 2020-07-06 NOTE — ED TRIAGE NOTES
Patient arrived from home via [facility] with complaint of emesis of clear liquid since last night. Patient A&OX1 with hx of mrdd and lives with a care giver. No caregiver at bedside. Patient denies pain. msp intact. Patient has a depends on which appears to be incontinent of urine. Lungs clear in all lobes. Active bowel sounds x4.

## 2020-07-06 NOTE — ED NOTES
Patient straight cath due to incontinence of urine. Sent to lab. Cloudy yellow urine noted.       Keyon Calero RN  07/06/20 4784

## 2020-11-03 PROBLEM — J18.9 PNEUMONIA OF BOTH LOWER LOBES DUE TO INFECTIOUS ORGANISM: Status: RESOLVED | Noted: 2020-01-01 | Resolved: 2020-01-01

## 2020-12-03 NOTE — ED NOTES
Bed: 03  Expected date:   Expected time:   Means of arrival:   Comments:  db Mathew RN  12/03/20 1630

## 2020-12-03 NOTE — ED NOTES
Dr. Nichole Reyes, this RN, and Kristyn Stovall, RN-Care Coordination at bedside for U/S. No cardiac activity noted on U/S. PEA noted on monitor. Time of death called at [address] by Dr. Nichole Reyes.       Maria Elena Messina, RN  12/03/20 3098

## 2020-12-03 NOTE — ED NOTES
Pt in hallway bed resting quietly. Opens eyes to voice stimuli. No distress noted.       Juan Farnsworth RN  12/03/20 0784

## 2020-12-03 NOTE — ED NOTES
Patient is alert does not answer questions appropriately which is her normal. She does express she is cold. Pupils are equal and reactive. Able to move extremities.  Lungs are clear      Delmy Salinas RN  12/03/20 7462

## 2020-12-03 NOTE — ED PROVIDER NOTES
[address] [facility] ED  eMERGENCYdEPARTMENT eNCOUnter      Pt Name: Kermit Cheadle  MRN: [id number]  [place] 5/9/1931  Date of evaluation: 12/3/2020  Maggie Chen MD    CHIEF COMPLAINT           HPI  Sheila Love is a 80 y.o. female per chart review has a h/o CHF, CKD, depression/anxiety, MR, HTN, hpl who is normally non verbal presents to the ED with AMS. Per group home, pt has been less active for the past 2 days. Group home notes pt notes gradual onset, moderate, constant, diffuse weakness x 1 day. Pt also with decreased PO intake. Pt nonverbal and not able to talk in the ED. ROS  Review of Systems   Unable to perform ROS: Patient nonverbal       Except as noted above the remainder of the review of systems was reviewed and negative.        PAST MEDICAL HISTORY     Past Medical History:   Diagnosis Date    CHF (congestive heart failure) ([facility] [address]     CKD (chronic kidney disease), stage III 8/5/2018    Depression     Dizzy spells 7/18/2016    HTN (hypertension) 5/27/2016    Hyperlipidemia     Hypokalemia 5/27/2016    Hypoxia 5/27/2016    Mental retardation 5/27/2016    Seizures (HCC)     SOB (shortness of breath) 7/18/2016    Unstable gait 7/19/2016         SURGICAL HISTORY       Past Surgical History:   Procedure Laterality Date    CHOLECYSTECTOMY           CURRENTMEDICATIONS       Previous Medications    AMLODIPINE (NORVASC) 10 MG TABLET    TK 1 T PO D    ASPIRIN 81 MG TABLET    Take 81 mg by mouth daily    CARVEDILOL (COREG) 12.5 MG TABLET    Take 1 tablet by mouth 2 times daily (with meals)    CHOLECALCIFEROL (VITAMIN D) 2000 UNITS CAPS CAPSULE    Take 2,000 Units by mouth daily    FUROSEMIDE (LASIX) 40 MG TABLET    Take 1 tablet by mouth daily    ISOSORBIDE MONONITRATE (IMDUR) 30 MG EXTENDED RELEASE TABLET    Take 2 tablets by mouth daily    LEVETIRACETAM (KEPPRA) 500 MG TABLET    Take 1 tablet by mouth 2 times daily    ONDANSETRON (ZOFRAN ODT) 4 MG DISINTEGRATING TABLET Take 1 tablet by mouth every 8 hours as needed for Nausea    PHENYTOIN (DILANTIN) 100 MG ER CAPSULE    Take 100 mg by mouth 2 times daily     POTASSIUM CHLORIDE (KLOR-CON) 10 MEQ EXTENDED RELEASE TABLET    TK 1 T PO 3 TIMES A WEEK    SERTRALINE (ZOLOFT) 50 MG TABLET    Take 50 mg by mouth daily    SPIRONOLACTONE (ALDACTONE) 25 MG TABLET    Take 1 tablet by mouth daily       ALLERGIES     Patient has no known allergies. FAMILY HISTORY       Family History   Family history unknown: Yes          SOCIAL HISTORY       Social History     Socioeconomic History    Marital status: Single     Spouse name: None    Number of children: None    Years of education: None    Highest education level: None   Occupational History    None   Social Needs    Financial resource strain: None    Food insecurity     Worry: None     Inability: None    Transportation needs     Medical: None     Non-medical: None   Tobacco Use    Smoking status: Never Smoker    Smokeless tobacco: Never Used   Substance and Sexual Activity    Alcohol use: No    Drug use: No    Sexual activity: Never   Lifestyle    Physical activity     Days per week: None     Minutes per session: None    Stress: None   Relationships    Social connections     Talks on phone: None     Gets together: None     Attends Mormonism service: None     Active member of club or organization: None     Attends meetings of clubs or organizations: None     Relationship status: None    Intimate partner violence     Fear of current or ex partner: None     Emotionally abused: None     Physically abused: None     Forced sexual activity: None   Other Topics Concern    None   Social History Narrative    ** Merged History Encounter **              PHYSICAL EXAM       ED Triage Vitals [12/03/20 1211]   BP Temp Temp src Pulse Resp SpO2 Height Weight   115/60 97.3 °F (36.3 °C) -- 70 19 96 % 5' 2\" (1.575 m) 135 lb (61.2 kg)       Physical Exam  Vitals signs and nursing note reviewed. Constitutional:       Appearance: She is well-developed. HENT:      Head: Normocephalic. Right Ear: External ear normal.      Left Ear: External ear normal.   Eyes:      Conjunctiva/sclera: Conjunctivae normal.      Pupils: Pupils are equal, round, and reactive to light. Neck:      Musculoskeletal: Normal range of motion and neck supple. Cardiovascular:      Rate and Rhythm: Normal rate and regular rhythm. Heart sounds: Normal heart sounds. Pulmonary:      Effort: Pulmonary effort is normal.      Breath sounds: Normal breath sounds. Abdominal:      General: Bowel sounds are normal. There is no distension. Palpations: Abdomen is soft. Tenderness: There is no abdominal tenderness. Musculoskeletal: Normal range of motion. Skin:     General: Skin is warm and dry. Neurological:      Comments: Pt does not open eyes, localizes to pain, incomprehensible words. GCS 9.     Psychiatric:         Mood and Affect: Mood normal.           MDM  79 yo female presents to the ED with AMS. Pt is afebrile, hemodynamically stable. EKG shows AV paced rhythm with HR 63.  Labs remarkable for BUN 42, Cr 1.53, WBC 2.2, Hb 9.2, plt 65, troponin 0.025.  UA shows UTI. CT head negative. CXR negative. Pt reassessed and GCS remains around 9. Pt given 500 ccs NS in the ED. Pt's BUN/Cr are baseline for the pt as she has CKD. Pt given IV rocephin for UTI. NH states that pt's current mental status is consistent with her baseline mental status. Group home educated about UTIs and AMS. Pt given prescription for cipro and will f/u with pcp. Group home understands plan. During transport, pt began to get bradycardic and hypotensive. Pt was brought back to the ED. Pt then unresponsive with GCS 3.  Pt's bp noted to be 80P systolics. Pt given 1 L NS in the ED. I spoke to the pt's POA who is Brittney Jorgensen who has had custody of the patient for the last 40 years. Brittney Jorgensen states that pt is in hospice.   Brittney Jorgensen recommended admitted the pt back to hospice. Pt reassessed however noted to be in PEA.  and family was informed.  informed. Pt passed in the ED. FINAL IMPRESSION      1. Altered mental status, unspecified altered mental status type    2. Acute cystitis without hematuria    3.  Cardiac arrest [facility])          DISPOSITION/PLAN   DISPOSITION  2020 06:46:45 PM        DISCHARGE MEDICATIONS:  [unfilled]         Russell Miller MD(electronically signed)  Attending Emergency Physician            Russell Miller MD  20 2 Abdoul Blanca MD  [phone]

## 2020-12-03 NOTE — ED NOTES
Pt brought back to ED via [facility]. Per EMS, pt went unresponsive in route home. EMS states that pt was posturing, O2 sat 83% on RA, and pt's HR 35 SB. Upon arrival to ED, pt is noted to be at baseline mentation per Dr. Cyndee Bobby. Pt responsive to verbal/painful stimuli.       Manuel Neff, RN  12/03/20 3913

## 2020-12-03 NOTE — ED NOTES
[facility] here for pt p/u. No distress noted. Off floor via cart.       Yair Harris RN  12/03/20 4933

## 2020-12-03 NOTE — ED TRIAGE NOTES
Staff report patient has been more fatigued and not eating well. Patient is MRDD and is at a group home.

## 2020-12-04 NOTE — ED NOTES
Dr. Brigid Ewing Phone number [phone] was spoken to by Dr. Blaire Macdonald Slight A Page  12/03/20 8584

## 2020-12-04 NOTE — ED NOTES
Pt unresponsive to all forms of stimuli at this time. 1-2 RPMs noted. Pt pale. No palpable pulses noted. Dr. Abhishek Haddad called to bedside.       eKna Sagastume RN  12/03/20 3936

## 2020-12-08 LAB
BLOOD CULTURE, ROUTINE: NORMAL
CULTURE, BLOOD 2: NORMAL

## 2023-05-10 NOTE — ED PROVIDER NOTES
[address] [facility] ED  eMERGENCY dEPARTMENTeNCOUnter      Pt Name: German Gutierrez  MRN: [id number]  [place] 5/9/1931  Date ofevaluation: 7/6/2020  Provider: Emi Turner DO    CHIEF COMPLAINT       Chief Complaint   Patient presents with    Emesis     of a clear liquid per ems. x2 days. patient is MRDD. A&OX1         HISTORY OF PRESENT ILLNESS   (Location/Symptom, Timing/Onset,Context/Setting, Quality, Duration, Modifying Factors, Severity)  Note limiting factors. German Gutierrez is a 80 y.o. female who presents to the emergency department . Patient comes in with vomiting clear liquid for 2 days. The patient is telling me that she feels better now. No diarrhea. HPI    NursingNotes were reviewed. REVIEW OF SYSTEMS    (2-9 systems for level 4, 10 or more for level 5)     Review of Systems   Unable to perform ROS: Dementia   Gastrointestinal: Positive for nausea and vomiting. Except as noted above the remainder of the review of systems was reviewed and negative.        PAST MEDICAL HISTORY     Past Medical History:   Diagnosis Date    [facility] [address]     CKD (chronic kidney disease), stage III ([facility] [address] 8/5/2018    Depression     Dizzy spells 7/18/2016    HTN (hypertension) 5/27/2016    Hyperlipidemia     Hypokalemia 5/27/2016    Hypoxia 5/27/2016    Mental retardation 5/27/2016    Seizures (HCC)     SOB (shortness of breath) 7/18/2016    Unstable gait 7/19/2016         SURGICALHISTORY       Past Surgical History:   Procedure Laterality Date    CHOLECYSTECTOMY           CURRENT MEDICATIONS       Discharge Medication List as of 7/6/2020  3:39 PM      CONTINUE these medications which have NOT CHANGED    Details   isosorbide mononitrate (IMDUR) 30 MG extended release tablet Take 2 tablets by mouth daily, Disp-90 tablet, R-3Normal      amLODIPine (NORVASC) 10 MG tablet TK 1 T PO D, R-1Historical Med      potassium chloride (KLOR-CON) 10 MEQ extended release tablet TK 1 T PO 3 TIMES A WEEK, R-1Historical Med      Cholecalciferol (VITAMIN D) 2000 units CAPS capsule Take 2,000 Units by mouth dailyHistorical Med      levETIRAcetam (KEPPRA) 500 MG tablet Take 1 tablet by mouth 2 times daily, Disp-60 tablet, R-0Print      aspirin 81 MG tablet Take 81 mg by mouth daily      sertraline (ZOLOFT) 50 MG tablet Take 50 mg by mouth daily      carvedilol (COREG) 12.5 MG tablet Take 1 tablet by mouth 2 times daily (with meals), Disp-180 tablet, R-3      furosemide (LASIX) 40 MG tablet Take 1 tablet by mouth daily, Disp-90 tablet, R-3      spironolactone (ALDACTONE) 25 MG tablet Take 1 tablet by mouth daily, Disp-90 tablet, R-3      phenytoin (DILANTIN) 100 MG ER capsule Take 100 mg by mouth 2 times daily Historical Med             ALLERGIES     Patient has no known allergies.     FAMILY HISTORY       Family History   Family history unknown: Yes          SOCIAL HISTORY       Social History     Socioeconomic History    Marital status: Single     Spouse name: None    Number of children: None    Years of education: None    Highest education level: None   Occupational History    None   Social Needs    Financial resource strain: None    Food insecurity     Worry: None     Inability: None    Transportation needs     Medical: None     Non-medical: None   Tobacco Use    Smoking status: Never Smoker    Smokeless tobacco: Never Used   Substance and Sexual Activity    Alcohol use: No    Drug use: No    Sexual activity: Never   Lifestyle    Physical activity     Days per week: None     Minutes per session: None    Stress: None   Relationships    Social connections     Talks on phone: None     Gets together: None     Attends Oriental orthodox service: None     Active member of club or organization: None     Attends meetings of clubs or organizations: None     Relationship status: None    Intimate partner violence     Fear of current or ex partner: None     Emotionally abused: None Physically abused: None     Forced sexual activity: None   Other Topics Concern    None   Social History Narrative    ** Merged History Encounter **            SCREENINGS              PHYSICAL EXAM    (up to 7 for level 4, 8 or more for level 5)     ED Triage Vitals [07/06/20 1343]   BP Temp Temp Source Pulse Resp SpO2 Height Weight   (!) 137/49 98.8 °F (37.1 °C) Oral 65 20 100 % 5' (1.524 m) 145 lb (65.8 kg)       Physical Exam  Vitals signs and nursing note reviewed. Constitutional:       General: She is not in acute distress. Appearance: She is well-developed. She is not diaphoretic. HENT:      Head: Normocephalic and atraumatic. Right Ear: External ear normal.      Left Ear: External ear normal.      Mouth/Throat:      Pharynx: No oropharyngeal exudate. Eyes:      Conjunctiva/sclera: Conjunctivae normal.      Pupils: Pupils are equal, round, and reactive to light. Neck:      Musculoskeletal: Normal range of motion and neck supple. Thyroid: No thyromegaly. Vascular: No JVD. Trachea: No tracheal deviation. Cardiovascular:      Rate and Rhythm: Normal rate. Heart sounds: Normal heart sounds. No murmur. Pulmonary:      Effort: Pulmonary effort is normal. No respiratory distress. Breath sounds: Normal breath sounds. No wheezing. Abdominal:      General: Bowel sounds are normal.      Palpations: Abdomen is soft. Tenderness: There is no abdominal tenderness. There is no guarding. Musculoskeletal: Normal range of motion. Skin:     General: Skin is warm and dry. Findings: No rash. Neurological:      Mental Status: She is alert and oriented to person, place, and time. Cranial Nerves: No cranial nerve deficit.    Psychiatric:         Behavior: Behavior normal.         DIAGNOSTIC RESULTS     EKG: All EKG's are interpreted by the Emergency Department Physician who either signs or Co-signsthis chart in the absence of a cardiologist.        RADIOLOGY: portions of this note were completed with a voice recognition program.  Efforts were made to edit the dictations but occasionally words are mis-transcribed.)    Tim Gee DO (electronically signed)  Attending Emergency Physician          Tim Gee,   07/09/20 [address], DO  07/09/20 1521 Ftsg Text: The defect edges were debeveled with a #15 scalpel blade. Given the location of the defect, shape of the defect and the proximity to free margins a full thickness skin graft was deemed most appropriate. Using a sterile surgical marker, the primary defect shape was transferred to the donor site. The area thus outlined was incised deep to adipose tissue with a #15 scalpel blade.  The harvested graft was then trimmed of adipose tissue until only dermis and epidermis was left.  The skin margins of the secondary defect were undermined to an appropriate distance in all directions utilizing iris scissors.  The secondary defect was closed with interrupted buried subcutaneous sutures.  The skin edges were then re-apposed with running  sutures.  The skin graft was then placed in the primary defect and oriented appropriately.